# Patient Record
Sex: FEMALE | Race: WHITE | NOT HISPANIC OR LATINO | Employment: FULL TIME | URBAN - METROPOLITAN AREA
[De-identification: names, ages, dates, MRNs, and addresses within clinical notes are randomized per-mention and may not be internally consistent; named-entity substitution may affect disease eponyms.]

---

## 2017-07-11 ENCOUNTER — PATIENT MESSAGE (OUTPATIENT)
Dept: OBSTETRICS AND GYNECOLOGY | Facility: CLINIC | Age: 32
End: 2017-07-11

## 2017-07-11 RX ORDER — NORETHINDRONE ACETATE AND ETHINYL ESTRADIOL 1MG-20(21)
1 KIT ORAL DAILY
Qty: 90 TABLET | Refills: 0 | Status: SHIPPED | OUTPATIENT
Start: 2017-07-11 | End: 2017-08-21 | Stop reason: SDUPTHER

## 2017-08-21 ENCOUNTER — OFFICE VISIT (OUTPATIENT)
Dept: OBSTETRICS AND GYNECOLOGY | Facility: CLINIC | Age: 32
End: 2017-08-21
Attending: OBSTETRICS & GYNECOLOGY
Payer: COMMERCIAL

## 2017-08-21 VITALS
DIASTOLIC BLOOD PRESSURE: 70 MMHG | HEIGHT: 68 IN | BODY MASS INDEX: 25.73 KG/M2 | WEIGHT: 169.75 LBS | SYSTOLIC BLOOD PRESSURE: 120 MMHG

## 2017-08-21 DIAGNOSIS — Z30.41 ENCOUNTER FOR SURVEILLANCE OF CONTRACEPTIVE PILLS: ICD-10-CM

## 2017-08-21 DIAGNOSIS — Z01.419 WELL WOMAN EXAM WITH ROUTINE GYNECOLOGICAL EXAM: Primary | ICD-10-CM

## 2017-08-21 PROCEDURE — 99395 PREV VISIT EST AGE 18-39: CPT | Mod: S$GLB,,, | Performed by: OBSTETRICS & GYNECOLOGY

## 2017-08-21 PROCEDURE — 99999 PR PBB SHADOW E&M-EST. PATIENT-LVL II: CPT | Mod: PBBFAC,,, | Performed by: OBSTETRICS & GYNECOLOGY

## 2017-08-21 RX ORDER — NORETHINDRONE ACETATE AND ETHINYL ESTRADIOL 1MG-20(21)
1 KIT ORAL DAILY
Qty: 90 TABLET | Refills: 4 | Status: SHIPPED | OUTPATIENT
Start: 2017-08-21 | End: 2018-09-20 | Stop reason: SDUPTHER

## 2017-08-21 NOTE — PROGRESS NOTES
SUBJECTIVE:   32 y.o. female   for annual routine Pap and checkup. No LMP recorded (lmp unknown)..  She has no unusual complaints.        Past Medical History:   Diagnosis Date    HSV (herpes simplex virus) anogenital infection      Past Surgical History:   Procedure Laterality Date    HERNIA REPAIR      as 1 year old     Social History     Social History    Marital status:      Spouse name: N/A    Number of children: N/A    Years of education: N/A     Occupational History    Not on file.     Social History Main Topics    Smoking status: Never Smoker    Smokeless tobacco: Never Used    Alcohol use Yes      Comment: few drinks a night/few times a week    Drug use: No    Sexual activity: Yes     Partners: Male     Birth control/ protection: OCP     Other Topics Concern    Not on file     Social History Narrative    No narrative on file     Family History   Problem Relation Age of Onset    Sleep apnea Mother     Arthritis Mother      injuries from gymnastics; hypermobile joints    Anxiety disorder Mother     Depression Mother     Hypertension Father     Bipolar disorder Maternal Grandmother     Cancer Neg Hx     Diabetes Neg Hx     Heart attack Neg Hx     Ovarian cancer Neg Hx     Breast cancer Neg Hx     Colon cancer Neg Hx      OB History    Para Term  AB Living   0 0 0 0 0 0   SAB TAB Ectopic Multiple Live Births   0 0 0 0                 Current Outpatient Prescriptions   Medication Sig Dispense Refill    norethindrone-ethinyl estradiol (MICROGESTIN FE /, 28,) 1 mg-20 mcg (21)/75 mg (7) per tablet Take 1 tablet by mouth once daily. 90 tablet 4    valacyclovir (VALTREX) 500 MG tablet Take 1 tablet (500 mg total) by mouth 2 (two) times daily. 10 tablet 12     No current facility-administered medications for this visit.      Allergies: Review of patient's allergies indicates no known allergies.     ROS:  Constitutional: no weight loss, weight gain, fever,  "fatigue  Eyes:  No vision changes, glasses/contacts  ENT/Mouth: No ulcers, sinus problems, ears ringing, headache  Cardiovascular: No inability to lie flat, chest pain, exercise intolerance, swelling, heart palpitations  Respiratory: No wheezing, coughing blood, shortness of breath, or cough  Gastrointestinal: No diarrhea, bloody stool, nausea/vomiting, constipation, gas, hemorrhoids  Genitourinary: No blood in urine, painful urination, urgency of urination, frequency of urination, incomplete emptying, incontinence, abnormal bleeding, painful periods, heavy periods, vaginal discharge, vaginal odor, painful intercourse, sexual problems, bleeding after intercourse.  Musculoskeletal: No muscle weakness  Skin/Breast: No painful breasts, nipple discharge, masses, rash, ulcers  Neurological: No passing out, seizures, numbness, headache  Endocrine: No diabetes, hypothyroid, hyperthyroid, hot flashes, hair loss, abnormal hair growth, ance  Psychiatric: No depression, crying  Hematologic: No bruises, bleeding, swollen lymph nodes, anemia.      OBJECTIVE:   The patient appears well, alert, oriented x 3, in no distress.  /70   Ht 5' 7.5" (1.715 m)   Wt 77 kg (169 lb 12.1 oz)   LMP  (LMP Unknown)   BMI 26.19 kg/m²   NECK: no thyromegaly, trachea midline  SKIN: no acne, striae, hirsutism  BREAST EXAM: breasts appear normal, no suspicious masses, no skin or nipple changes or axillary nodes  ABDOMEN: no hernias, masses, or hepatosplenomegaly  GENITALIA: normal external genitalia, no erythema, no discharge  URETHRA: normal urethra, normal urethral meatus  VAGINA: Normal  CERVIX: no lesions or cervical motion tenderness  UTERUS: normal size, contour, position, consistency, mobility, non-tender  ADNEXA: normal adnexa and no mass, fullness, tenderness    \  ASSESSMENT:   Rich was seen today for annual exam and medication refill.    Diagnoses and all orders for this visit:    Well woman exam with routine gynecological " exam    Encounter for surveillance of contraceptive pills  -     norethindrone-ethinyl estradiol (MICROGESTIN FE 1/20, 28,) 1 mg-20 mcg (21)/75 mg (7) per tablet; Take 1 tablet by mouth once daily.    pap due next year

## 2017-08-27 DIAGNOSIS — Z30.9 ENCOUNTER FOR CONTRACEPTIVE MANAGEMENT: ICD-10-CM

## 2017-08-28 RX ORDER — NORETHINDRONE ACETATE AND ETHINYL ESTRADIOL 1MG-20(21)
1 KIT ORAL DAILY
Qty: 84 TABLET | Refills: 0 | OUTPATIENT
Start: 2017-08-28

## 2017-11-27 ENCOUNTER — TELEPHONE (OUTPATIENT)
Dept: OBSTETRICS AND GYNECOLOGY | Facility: CLINIC | Age: 32
End: 2017-11-27

## 2017-11-27 DIAGNOSIS — N76.6 VULVAR ULCER: ICD-10-CM

## 2017-11-27 RX ORDER — ACYCLOVIR 400 MG/1
400 TABLET ORAL 4 TIMES DAILY
Qty: 20 TABLET | Refills: 12 | Status: SHIPPED | OUTPATIENT
Start: 2017-11-27 | End: 2018-10-15 | Stop reason: SDUPTHER

## 2017-11-27 RX ORDER — VALACYCLOVIR HYDROCHLORIDE 500 MG/1
500 TABLET, FILM COATED ORAL 2 TIMES DAILY
Qty: 10 TABLET | Refills: 12 | Status: SHIPPED | OUTPATIENT
Start: 2017-11-27 | End: 2017-11-27

## 2018-05-23 ENCOUNTER — PATIENT MESSAGE (OUTPATIENT)
Dept: OBSTETRICS AND GYNECOLOGY | Facility: CLINIC | Age: 33
End: 2018-05-23

## 2018-05-29 ENCOUNTER — OFFICE VISIT (OUTPATIENT)
Dept: INTERNAL MEDICINE | Facility: CLINIC | Age: 33
End: 2018-05-29
Payer: COMMERCIAL

## 2018-05-29 VITALS
WEIGHT: 172.38 LBS | HEART RATE: 84 BPM | DIASTOLIC BLOOD PRESSURE: 70 MMHG | SYSTOLIC BLOOD PRESSURE: 118 MMHG | OXYGEN SATURATION: 99 % | TEMPERATURE: 98 F | HEIGHT: 67 IN | BODY MASS INDEX: 27.06 KG/M2

## 2018-05-29 DIAGNOSIS — J02.9 PHARYNGITIS, UNSPECIFIED ETIOLOGY: Primary | ICD-10-CM

## 2018-05-29 LAB — DEPRECATED S PYO AG THROAT QL EIA: NEGATIVE

## 2018-05-29 PROCEDURE — 99214 OFFICE O/P EST MOD 30 MIN: CPT | Mod: S$GLB,,, | Performed by: NURSE PRACTITIONER

## 2018-05-29 PROCEDURE — 87081 CULTURE SCREEN ONLY: CPT

## 2018-05-29 PROCEDURE — 87880 STREP A ASSAY W/OPTIC: CPT

## 2018-05-29 PROCEDURE — 3008F BODY MASS INDEX DOCD: CPT | Mod: CPTII,S$GLB,, | Performed by: NURSE PRACTITIONER

## 2018-05-29 PROCEDURE — 99999 PR PBB SHADOW E&M-EST. PATIENT-LVL IV: CPT | Mod: PBBFAC,,, | Performed by: NURSE PRACTITIONER

## 2018-05-29 NOTE — PROGRESS NOTES
Subjective:       Patient ID: Klarissa Brown is a 33 y.o. female.    Chief Complaint: Sore Throat    Pt here c/o sore throat x 3 days.  Felt feverish but did not check temp.  Pt has been taking Ibuprofen q4 hours .  Pt denies n/v/d/cough        Sore Throat    Pertinent negatives include no abdominal pain, congestion, coughing, diarrhea, drooling, neck pain, shortness of breath, trouble swallowing or vomiting.     Review of Systems   Constitutional: Positive for fever.        Subjective fever     HENT: Positive for sore throat. Negative for congestion, drooling, postnasal drip, rhinorrhea, sinus pain, sinus pressure, tinnitus, trouble swallowing and voice change.    Eyes: Negative for photophobia and visual disturbance.   Respiratory: Negative for cough and shortness of breath.    Cardiovascular: Negative for chest pain and palpitations.   Gastrointestinal: Negative for abdominal pain, constipation, diarrhea and vomiting.   Genitourinary: Negative for difficulty urinating and dysuria.   Musculoskeletal: Negative for arthralgias, myalgias, neck pain and neck stiffness.   Skin: Negative for rash.   Hematological: Negative for adenopathy.   Psychiatric/Behavioral: Negative for sleep disturbance.         Past Medical History:   Diagnosis Date    HSV (herpes simplex virus) anogenital infection      Past Surgical History:   Procedure Laterality Date    HERNIA REPAIR      as 1 year old     Social History     Social History Narrative    No narrative on file     Family History   Problem Relation Age of Onset    Sleep apnea Mother     Arthritis Mother         injuries from gymnastics; hypermobile joints    Anxiety disorder Mother     Depression Mother     Hypertension Father     Bipolar disorder Maternal Grandmother     Cancer Neg Hx     Diabetes Neg Hx     Heart attack Neg Hx     Ovarian cancer Neg Hx     Breast cancer Neg Hx     Colon cancer Neg Hx      Outpatient Encounter Prescriptions as of 5/29/2018  "  Medication Sig Dispense Refill    norethindrone-ethinyl estradiol (MICROGESTIN FE 1/20, 28,) 1 mg-20 mcg (21)/75 mg (7) per tablet Take 1 tablet by mouth once daily. 90 tablet 4    acyclovir (ZOVIRAX) 400 MG tablet Take 1 tablet (400 mg total) by mouth 4 (four) times daily. 20 tablet 12     No facility-administered encounter medications on file as of 5/29/2018.      Last 3 sets of Vitals  Vitals - 1 value per visit 10/28/2016 8/21/2017 5/29/2018   SYSTOLIC - 120 118   DIASTOLIC - 70 70   PULSE - - 84   TEMPERATURE - - 98.4   SPO2 - - 99   Weight (lb) 163.14 169.75 172.4   Weight (kg) 74 77 78.2   HEIGHT - 5' 7.5" 5' 7"   BODY MASS INDEX 25.55 26.19 27   VISIT REPORT - - -   Pain Score  0 0 2         Objective:      Physical Exam   Constitutional: She is oriented to person, place, and time. She appears well-developed and well-nourished. No distress.   32 yo female in NAD non toxic appearing     HENT:   Head: Normocephalic and atraumatic.   Right Ear: External ear normal.   Left Ear: External ear normal.   Nose: Nose normal.   Mouth/Throat: Uvula is midline and mucous membranes are normal. Posterior oropharyngeal erythema present. No oropharyngeal exudate, posterior oropharyngeal edema or tonsillar abscesses. No tonsillar exudate.   Eyes: Conjunctivae are normal. Pupils are equal, round, and reactive to light. Right eye exhibits no discharge. Left eye exhibits no discharge.   Neck: Normal range of motion. Neck supple.   Cardiovascular: Normal rate, regular rhythm, normal heart sounds and intact distal pulses.    Pulmonary/Chest: Effort normal and breath sounds normal. No respiratory distress. She has no wheezes.   Abdominal: Soft.   Lymphadenopathy:     She has no cervical adenopathy.   Neurological: She is alert and oriented to person, place, and time.   Skin: Skin is warm and dry. Capillary refill takes less than 2 seconds. No rash noted. She is not diaphoretic. No erythema. No pallor.   Psychiatric: She has a " normal mood and affect. Her behavior is normal. Judgment and thought content normal.   Nursing note and vitals reviewed.          Lab Results   Component Value Date    WBC 7.07 01/20/2015    RBC 4.28 01/20/2015    HGB 12.6 01/20/2015    HCT 37.6 01/20/2015    MCV 88 01/20/2015    MCH 29.4 01/20/2015    MCHC 33.5 01/20/2015    RDW 12.8 01/20/2015     01/20/2015    MPV 11.0 01/20/2015    GRAN 4.1 01/20/2015    GRAN 57.3 01/20/2015    LYMPH 2.5 01/20/2015    LYMPH 35.4 01/20/2015    MONO 0.4 01/20/2015    MONO 5.2 01/20/2015    EOS 0.1 01/20/2015    BASO 0.07 01/20/2015    EOSINOPHIL 1.0 01/20/2015    BASOPHIL 1.0 01/20/2015     Lab Results   Component Value Date    WBC 7.07 01/20/2015    HGB 12.6 01/20/2015    HCT 37.6 01/20/2015     01/20/2015    ALT 8 (L) 01/20/2015    AST 14 01/20/2015     01/20/2015    K 4.1 01/20/2015     01/20/2015    CREATININE 0.8 01/20/2015    BUN 12 01/20/2015    CO2 26 01/20/2015       Assessment:       1. Pharyngitis, unspecified etiology        Plan:           Klarissa was seen today for sore throat.    Diagnoses and all orders for this visit:    Pharyngitis, unspecified etiology  -     Throat Screen, Rapid      Patient Instructions     Pharyngitis (Sore Throat), Report Pending    Pharyngitis (sore throat) is often due to a virus. It can also be caused by the streptococcus, or strep, bacterium, often called strep throat. Both viral and strep infections can cause throat pain that is worse when swallowing, aching all over with headache, and fever. Both types of infections are contagious. They may be spread by coughing, kissing, or touching others after touching your mouth or nose.  A test has been done to find out whether you (or your child, if your child is the patient) have strep throat. Call this facility or your healthcare provider if you were not given your test results. If the test is positive for strep infection, you will need to take antibiotic medicines. A  prescription can be called into your pharmacy at that time. If the test is negative, you probably have a viral pharyngitis. This does not need to be treated with antibiotics. Until you receive the results of the strep test, you should stay home from work. If your child is being tested, he or she should stay home from school.  Home care  · Rest at home. Drink plenty of fluids so you won't get dehydrated.  · If the test is positive for strep, don't go to work or school for the first 2 days of taking the antibiotics. After this time, you will not be contagious. You can then return to work or school if you are feeling better.   · Take the antibiotic medicine for the full 10 days, even if you feel better. This is very important to make sure the infection is treated. It is also important to prevent drug-resistant germs from developing. If you were given an antibiotic shot, you won't need more antibiotics.  · For children: Use acetaminophen for fever, fussiness, or discomfort. In infants older than 6 months of age, you may use ibuprofen instead of acetaminophen. Talk with your child's healthcare provider before giving these medicines if your child has chronic liver or kidney disease or ever had a stomach ulcer or GI bleeding. Never give aspirin to a child under 18 years of age who is ill with a fever. It may cause severe liver damage.  · For adults: Use acetaminophen or ibuprofen to control pain or fever, unless another medicine was prescribed for this. Talk with your healthcare provider before taking these medicines if you have chronic liver or kidney disease or ever had a stomach ulcer or GI bleeding.  · Use throat lozenges or numbing throat sprays to help reduce pain. Gargling with warm salt water will also help reduce throat pain. For this, dissolve 1/2 teaspoon of salt in 1 glass of warm water. To help soothe a sore throat, children can sip on juice or a popsicle. Children 5 years and older can also suck on a lollipop  or hard candy.  · Don't eat salty or spicy foods. These can irritate the throat.  Follow-up care  Follow up with your healthcare provider or our staff if you don't get better over the next week.  When to seek medical advice  Call your healthcare provider right away if any of these occur:  · Fever as directed by your healthcare provider. For children, seek care if:  ¨ Your child is of any age and has repeated fevers above 104°F (40°C).  ¨ Your child is younger than 2 years of age and has a fever of 100.4°F (38°C) that continues for more than 1 day.  ¨ Your child is 2 years old or older and has a fever of 100.4°F (38°C) that continues for more than 3 days.  · New or worsening ear pain, sinus pain, or headache  · Painful lumps in the back of neck  · Stiff neck  · Lymph nodes are getting larger  · Inability to swallow liquids, excessive drooling, or inability to open mouth wide due to throat pain  · Signs of dehydration (very dark urine or no urine, sunken eyes, dizziness)  · Trouble breathing or noisy breathing  · Muffled voice  · New rash  · Child appears to be getting sicker  Date Last Reviewed: 4/13/2015  © 7894-0327 [employer]. [address] [place] [address], [place] [address]. All rights reserved. This information is not intended as a substitute for professional medical care. Always follow your healthcare professional's instructions.

## 2018-05-29 NOTE — PATIENT INSTRUCTIONS

## 2018-06-01 LAB — BACTERIA THROAT CULT: NORMAL

## 2018-07-16 ENCOUNTER — TELEPHONE (OUTPATIENT)
Dept: ORTHOPEDICS | Facility: CLINIC | Age: 33
End: 2018-07-16

## 2018-07-16 DIAGNOSIS — M79.642 LEFT HAND PAIN: Primary | ICD-10-CM

## 2018-07-18 ENCOUNTER — HOSPITAL ENCOUNTER (OUTPATIENT)
Dept: RADIOLOGY | Facility: OTHER | Age: 33
Discharge: HOME | End: 2018-07-18
Attending: PHYSICIAN ASSISTANT
Payer: COMMERCIAL

## 2018-07-18 ENCOUNTER — OFFICE VISIT (OUTPATIENT)
Dept: ORTHOPEDICS | Facility: CLINIC | Age: 33
End: 2018-07-18
Payer: COMMERCIAL

## 2018-07-18 VITALS
RESPIRATION RATE: 18 BRPM | WEIGHT: 172 LBS | SYSTOLIC BLOOD PRESSURE: 126 MMHG | BODY MASS INDEX: 27 KG/M2 | DIASTOLIC BLOOD PRESSURE: 78 MMHG | HEIGHT: 67 IN | HEART RATE: 82 BPM

## 2018-07-18 DIAGNOSIS — M79.642 LEFT HAND PAIN: ICD-10-CM

## 2018-07-18 DIAGNOSIS — R20.0 FINGER NUMBNESS: Primary | ICD-10-CM

## 2018-07-18 PROCEDURE — 99203 OFFICE O/P NEW LOW 30 MIN: CPT | Mod: S$GLB,,, | Performed by: PHYSICIAN ASSISTANT

## 2018-07-18 PROCEDURE — 3008F BODY MASS INDEX DOCD: CPT | Mod: CPTII,S$GLB,, | Performed by: PHYSICIAN ASSISTANT

## 2018-07-18 PROCEDURE — 73130 X-RAY EXAM OF HAND: CPT | Mod: TC,FY,LT

## 2018-07-18 PROCEDURE — 73130 X-RAY EXAM OF HAND: CPT | Mod: 26,LT,, | Performed by: RADIOLOGY

## 2018-07-18 PROCEDURE — 99999 PR PBB SHADOW E&M-EST. PATIENT-LVL III: CPT | Mod: PBBFAC,,, | Performed by: PHYSICIAN ASSISTANT

## 2018-07-18 NOTE — PROGRESS NOTES
Subjective:      Patient ID: Klarissa Brown is a 33 y.o. female.    Chief Complaint: Pain of the Left Hand      HPI  Klarissa Brown is a right hand dominant 33 y.o. female presenting today for Left long and ring finger irritation.  There was not a history of trauma.  Onset of symptoms began 1-2 months ago, it is intermittent.   She reports irritation of the fingers, she denies any pain or numbness.  She does say that the irritation may be a slight tingling.  She reports that it is predominantly in the long finger, ring finger, and small finger.  She reports that she was out of town for a week and noticed improvement in her symptoms, the symptoms returned when she came back to town.  She is unsure if it is related to her work schedule or the with she sleeps, or both.  She says that over the past few days she has tried timing a towel around her elbow to keep her arm straight while sleeping.  She has noticed improvement in her symptoms since trying this.      Review of patient's allergies indicates:  No Known Allergies      Current Outpatient Prescriptions   Medication Sig Dispense Refill    norethindrone-ethinyl estradiol (MICROGESTIN FE 1/20, 28,) 1 mg-20 mcg (21)/75 mg (7) per tablet Take 1 tablet by mouth once daily. 90 tablet 4    acyclovir (ZOVIRAX) 400 MG tablet Take 1 tablet (400 mg total) by mouth 4 (four) times daily. 20 tablet 12     No current facility-administered medications for this visit.        Past Medical History:   Diagnosis Date    HSV (herpes simplex virus) anogenital infection        Past Surgical History:   Procedure Laterality Date    HERNIA REPAIR      as 1 year old         Review of Systems:  Review of Systems   Constitution: Negative for chills and fever.   Skin: Negative for rash and suspicious lesions.   Musculoskeletal:        See HPI   Neurological: Negative for dizziness, headaches and light-headedness.   Psychiatric/Behavioral: Negative for depression. The patient is  "not nervous/anxious.          OBJECTIVE:     PHYSICAL EXAM:  Height: 5' 7" (170.2 cm) Weight: 78 kg (172 lb)  Vitals:    07/18/18 1556   BP: 126/78   Pulse: 82   Resp: 18   Weight: 78 kg (172 lb)   Height: 5' 7" (1.702 m)   PainSc: 0-No pain   PainLoc: Hand     General    Vitals reviewed.  Constitutional: She is oriented to person, place, and time. She appears well-developed and well-nourished.   HENT:   Head: Normocephalic and atraumatic.   Neck: Normal range of motion.   Cardiovascular: Normal rate.    Pulmonary/Chest: Effort normal. No respiratory distress.   Neurological: She is alert and oriented to person, place, and time.   Psychiatric: She has a normal mood and affect. Her behavior is normal. Judgment and thought content normal.             Musculoskeletal:  No scars or edema appreciated.  No ecchymosis.  Good finger and wrist range of motion bilaterally, good elbow range of motion bilaterally. She is nontender to palpation.  Positive Tinel's at the left elbow, negative on the right.  Negative Tinel's over Guyon's canal and the carpal tunnel bilaterally. Positive Durkan's on the left.  Neurovascularly intact-good sensation and motor function, good capillary refill.    RADIOGRAPHS:  Left Hand X-Ray, 7/18/18  FINDINGS:  The skeletal structures are intact with good alignment.  Joint spaces are preserved without significant arthritis.  No focal soft tissue swelling is seen.   Impression   No significant abnormality.     Comments: I have personally reviewed the imaging and I agree with the above radiologist's report.    ASSESSMENT/PLAN:   Klarissa was seen today for pain.    Diagnoses and all orders for this visit:    Finger numbness           - We talked at length about the anatomy and pathophysiology of   Encounter Diagnosis   Name Primary?    Finger numbness Yes       - patient was provided with carpal tunnel and cubital tunnel Nerve glide handout  - continue with keeping elbow straight at night for sleeping  - " if finger numbness continues will try carpal tunnel brace and/or therapy  - discussed surgical options for treatment as well  - call if not improved    Disclaimer: This note has been generated using voice-recognition software. There may be typographical errors that have been missed during proof-reading.

## 2018-08-23 ENCOUNTER — TELEPHONE (OUTPATIENT)
Dept: INTERNAL MEDICINE | Facility: CLINIC | Age: 33
End: 2018-08-23

## 2018-08-23 ENCOUNTER — OFFICE VISIT (OUTPATIENT)
Dept: INTERNAL MEDICINE | Facility: CLINIC | Age: 33
End: 2018-08-23
Payer: COMMERCIAL

## 2018-08-23 ENCOUNTER — LAB VISIT (OUTPATIENT)
Dept: LAB | Facility: HOSPITAL | Age: 33
End: 2018-08-23
Attending: INTERNAL MEDICINE
Payer: COMMERCIAL

## 2018-08-23 VITALS
HEART RATE: 68 BPM | HEIGHT: 67 IN | WEIGHT: 174.38 LBS | OXYGEN SATURATION: 99 % | TEMPERATURE: 98 F | BODY MASS INDEX: 27.37 KG/M2 | SYSTOLIC BLOOD PRESSURE: 102 MMHG | DIASTOLIC BLOOD PRESSURE: 72 MMHG

## 2018-08-23 DIAGNOSIS — Z00.00 ANNUAL PHYSICAL EXAM: ICD-10-CM

## 2018-08-23 DIAGNOSIS — Z00.00 ANNUAL PHYSICAL EXAM: Primary | ICD-10-CM

## 2018-08-23 DIAGNOSIS — Z11.3 ROUTINE SCREENING FOR STI (SEXUALLY TRANSMITTED INFECTION): ICD-10-CM

## 2018-08-23 DIAGNOSIS — K64.4 RESIDUAL HEMORRHOIDAL SKIN TAGS: ICD-10-CM

## 2018-08-23 LAB
ALBUMIN SERPL BCP-MCNC: 4.3 G/DL
ALP SERPL-CCNC: 44 U/L
ALT SERPL W/O P-5'-P-CCNC: 13 U/L
ANION GAP SERPL CALC-SCNC: 6 MMOL/L
AST SERPL-CCNC: 17 U/L
BILIRUB SERPL-MCNC: 0.4 MG/DL
BUN SERPL-MCNC: 10 MG/DL
CALCIUM SERPL-MCNC: 9.6 MG/DL
CHLORIDE SERPL-SCNC: 108 MMOL/L
CHOLEST SERPL-MCNC: 144 MG/DL
CHOLEST/HDLC SERPL: 2.7 {RATIO}
CO2 SERPL-SCNC: 26 MMOL/L
CREAT SERPL-MCNC: 0.9 MG/DL
ERYTHROCYTE [DISTWIDTH] IN BLOOD BY AUTOMATED COUNT: 12.6 %
EST. GFR  (AFRICAN AMERICAN): >60 ML/MIN/1.73 M^2
EST. GFR  (NON AFRICAN AMERICAN): >60 ML/MIN/1.73 M^2
GLUCOSE SERPL-MCNC: 92 MG/DL
HCT VFR BLD AUTO: 40.1 %
HDLC SERPL-MCNC: 54 MG/DL
HDLC SERPL: 37.5 %
HGB BLD-MCNC: 13.2 G/DL
HIV 1+2 AB+HIV1 P24 AG SERPL QL IA: NEGATIVE
LDLC SERPL CALC-MCNC: 77.8 MG/DL
MCH RBC QN AUTO: 28.4 PG
MCHC RBC AUTO-ENTMCNC: 32.9 G/DL
MCV RBC AUTO: 86 FL
NONHDLC SERPL-MCNC: 90 MG/DL
PLATELET # BLD AUTO: 317 K/UL
PMV BLD AUTO: 10.8 FL
POTASSIUM SERPL-SCNC: 4.8 MMOL/L
PROT SERPL-MCNC: 7.3 G/DL
RBC # BLD AUTO: 4.65 M/UL
SODIUM SERPL-SCNC: 140 MMOL/L
TRIGL SERPL-MCNC: 61 MG/DL
WBC # BLD AUTO: 5.62 K/UL

## 2018-08-23 PROCEDURE — 86703 HIV-1/HIV-2 1 RESULT ANTBDY: CPT

## 2018-08-23 PROCEDURE — 85027 COMPLETE CBC AUTOMATED: CPT

## 2018-08-23 PROCEDURE — 87491 CHLMYD TRACH DNA AMP PROBE: CPT

## 2018-08-23 PROCEDURE — 80053 COMPREHEN METABOLIC PANEL: CPT

## 2018-08-23 PROCEDURE — 99999 PR PBB SHADOW E&M-EST. PATIENT-LVL III: CPT | Mod: PBBFAC,,, | Performed by: INTERNAL MEDICINE

## 2018-08-23 PROCEDURE — 99395 PREV VISIT EST AGE 18-39: CPT | Mod: S$GLB,,, | Performed by: INTERNAL MEDICINE

## 2018-08-23 PROCEDURE — 80061 LIPID PANEL: CPT

## 2018-08-23 PROCEDURE — 36415 COLL VENOUS BLD VENIPUNCTURE: CPT

## 2018-08-23 PROCEDURE — 86592 SYPHILIS TEST NON-TREP QUAL: CPT

## 2018-08-23 NOTE — PATIENT INSTRUCTIONS
Try taking senna + colace when traveling to keep from being constipated.       Hemorrhoids    Hemorrhoids are swollen and inflamed veins inside the rectum and near the anus. The rectum is the last several inches of the colon. The anus is the passage between the rectum and the outside of the body.  Causes  The veins can become swollen due to increased pressure in them. This is most often caused by:  · Chronic constipation or diarrhea  · Straining when having a bowel movement  · Sitting too long on the toilet  · A low-fiber diet  · Pregnancy  Symptoms  · Bleeding from the rectum (this may be noticeable after bowel movements)  · Lump near the anus  · Itching around the anus  · Pain around the anus  There are different types of hemorrhoids. Depending on the type you have and the severity, you may be able to treat yourself at home. In some cases, a procedure may be the best treatment option. Your healthcare provider can tell you more about this, if needed.  Home care  General care  · To get relief from pain or itching, try:  ¨ Topical products. Your healthcare provider may prescribe or recommend creams, ointments, or pads that can be applied to the hemorrhoid. Use these exactly as directed.  ¨ Medicines. Your healthcare provider may recommend stool softeners, suppositories, or laxatives to help manage constipation. Use these exactly as directed.  ¨ Sitz baths. A sitz bath involves sitting in a few inches of warm bath water. Be careful not to make the water so hot that you burn yourself--test it before sitting in it. Soak for about 10 to 15 minutes a few times a day. This may help relieve pain.  Tips to help prevent hemorrhoids  · Eat more fiber. Fiber adds bulk to stool and absorbs water as it moves through your colon. This makes stool softer and easier to pass.  ¨ Increase the fiber in your diet with more fiber-rich foods. These include fresh fruit, vegetables, and whole grains.  ¨ Take a fiber supplement or bulking  agent, if advised to by your provider. These include products such as psyllium or methylcellulose.  · Drink plenty of water, if directed to by your provider. This can help keep stool soft.  · Be more active. Frequent exercise aids digestion and helps prevent constipation. It may also help make bowel movements more regular.  · Dont strain during bowel movements. This can make hemorrhoids more likely. Also, dont sit on the toilet for long periods of time.  Follow-up care  Follow up with your healthcare provider, or as advised. If a culture or imaging tests were done, you will be notified of the results when they are ready. This may take a few days or longer.  When to seek medical advice  Call your healthcare provider right away if any of these occur:  · Increased bleeding from the rectum  · Increased pain around the rectum or anus  · Weakness or dizziness  Call 911  Call 911 or return to the emergency department right away if any of these occur:  · Trouble breathing or swallowing  · Fainting or loss of consciousness  · Unusually fast heart rate  · Vomiting blood  · Large amounts of blood in stool  Date Last Reviewed: 6/22/2015  © 6048-2057 [employer]. [address] [place] [address], [place] [address]. All rights reserved. This information is not intended as a substitute for professional medical care. Always follow your healthcare professional's instructions.

## 2018-08-23 NOTE — PROGRESS NOTES
Subjective:       Patient ID: Klarissa Brown is a 33 y.o. female.    Chief Complaint: Establish Care    HPI  32 y/o woman here for visit to [facility] / annual exam. Last saw Dr Kenny for [facility] visit in 2015.     Concerned re: possible hemorrhoid, otherwise no major medical concerns.    Hemorrhoid - Has noted bump at rectum, not enlarging, has had this for years, would like this checked. Occasional tear if constipation + straining with some pain but not painful generally, occasional itching, no blood on toilet paper. Does tend to have constipation since childhood; better recently with better diet, good hydration, regular exercise but has this when traveling or being off her usual routine.     Recently saw Ortho for hand / finger pain and numbness - noted to have ulnar nerve compression while sleeping, has started sleeping with arm straight and watching ergonomics, using elbow brace -- this has resolved the problem.     Follows with Dr Fallon for Gyn. Last Pap 6/2015, due for repeat. Reports h/o HPV in the past but doesn't think this was hi-risk strain.  On OCPs for contraception.    Father with prediabetes at age 70  Thinks up to date on TDaP as of [id number]  Has traveled in [place] for work previously    Review of Systems   Constitutional: Negative for activity change and unexpected weight change.   HENT: Negative.    Eyes: Negative for visual disturbance.   Respiratory: Negative.  Negative for shortness of breath.    Cardiovascular: Negative.  Negative for leg swelling.   Gastrointestinal: Negative.  Negative for abdominal pain, anal bleeding, blood in stool and rectal pain. Constipation: occasionally, not currently.   Endocrine: Negative.    Genitourinary: Negative.    Musculoskeletal: Positive for back pain (mild recently). Negative for joint swelling. Arthralgias: resolved.   Skin: Negative for rash.   Allergic/Immunologic: Negative for environmental allergies.   Neurological: Negative.   "  Psychiatric/Behavioral: Negative for dysphoric mood. The patient is not nervous/anxious.          Past Medical History:   Diagnosis Date    HSV (herpes simplex virus) anogenital infection      Past Surgical History:   Procedure Laterality Date    HERNIA REPAIR      as 1 year old     Family History   Problem Relation Age of Onset    Sleep apnea Mother     Arthritis Mother         injuries from gymnastics; hypermobile joints    Anxiety disorder Mother     Depression Mother     Hypertension Father     Bipolar disorder Maternal Grandmother     Other Brother         hypomania but not bipolar d/o?    Cancer Neg Hx     Diabetes Neg Hx     Heart attack Neg Hx     Ovarian cancer Neg Hx     Breast cancer Neg Hx     Colon cancer Neg Hx        Social History     Tobacco Use    Smoking status: Never Smoker    Smokeless tobacco: Never Used   Substance Use Topics    Alcohol use: Yes     Comment: 3-5 drinks/week    Drug use: No       Medications and allergies reviewed.     Objective:          Vitals:    08/23/18 0848   BP: 102/72   BP Location: Left arm   Patient Position: Sitting   Pulse: 68   Temp: 98.2 °F (36.8 °C)   TempSrc: Oral   SpO2: 99%   Weight: 79.1 kg (174 lb 6.1 oz)   Height: 5' 7" (1.702 m)     Body mass index is 27.31 kg/m².  Physical Exam   Constitutional: She is oriented to person, place, and time. She appears well-developed and well-nourished. No distress.   HENT:   Head: Normocephalic and atraumatic.   Mouth/Throat: Oropharynx is clear and moist.   Eyes: Conjunctivae and EOM are normal. Pupils are equal, round, and reactive to light.   Neck: Neck supple. No thyromegaly present.   Cardiovascular: Normal rate, regular rhythm and normal heart sounds.   No murmur heard.  Pulmonary/Chest: Effort normal and breath sounds normal. No respiratory distress.   Abdominal: Soft. Bowel sounds are normal. She exhibits no distension. There is no tenderness.   Genitourinary:   Genitourinary Comments: One " hemorrhoidal skin tag, no bleeding, no firm nodule or skin changes, no pain, normal rectal tone   Musculoskeletal: Normal range of motion. She exhibits no edema or tenderness.   +tension but not tenderness in paraspinal muscles of low back   Lymphadenopathy:     She has no cervical adenopathy.   Neurological: She is alert and oriented to person, place, and time. No cranial nerve deficit.   Skin: Skin is warm and dry.   Psychiatric: She has a normal mood and affect. Her behavior is normal. Thought content normal.   Vitals reviewed.      Lab Results   Component Value Date    WBC 7.07 01/20/2015    HGB 12.6 01/20/2015    HCT 37.6 01/20/2015     01/20/2015    ALT 8 (L) 01/20/2015    AST 14 01/20/2015     01/20/2015    K 4.1 01/20/2015     01/20/2015    CREATININE 0.8 01/20/2015    BUN 12 01/20/2015    CO2 26 01/20/2015       Assessment:       1. Annual physical exam    2. Routine screening for STI (sexually transmitted infection)    3. Residual hemorrhoidal skin tags        Plan:   Klarissa was seen today for establish care.    Diagnoses and all orders for this visit:    Annual physical exam - Overall healthy, doing well. Reviewed chronic and preventive health concerns.  She will check on her immunization record and send this in  Reminded her to get Pap test done  Reminded re: flu vaccine in the fall  -     CBC Without Differential; Future  -     Comprehensive metabolic panel; Future  -     Lipid panel; Future    Routine screening for STI (sexually transmitted infection) - no recent HIV test, would like these done, no recent risk of exposure  H/o HSV, HPV in the past  -     C. trachomatis/N. gonorrhoeae by AMP DNA  -     HIV-1 and HIV-2 antibodies; Future  -     RPR; Future    Residual hemorrhoidal skin tags - counseled re: hemorrhoids, recommended use senna/colace if constipating    Ulnar nerve compression symptoms have resolved - continue watching ergonomics, elbow brace    Health maintenance reviewed  with patient.   Labs today  Follow-up in about 1 year (around 8/23/2019) for annual physical.    Greg Cook MD  Internal Medicine  [facility]  8/23/2018

## 2018-08-24 LAB
C TRACH DNA SPEC QL NAA+PROBE: NOT DETECTED
N GONORRHOEA DNA SPEC QL NAA+PROBE: NOT DETECTED
RPR SER QL: NORMAL

## 2018-09-20 DIAGNOSIS — Z30.41 ENCOUNTER FOR SURVEILLANCE OF CONTRACEPTIVE PILLS: ICD-10-CM

## 2018-09-24 RX ORDER — NORETHINDRONE ACETATE AND ETHINYL ESTRADIOL 1MG-20(21)
KIT ORAL
Qty: 84 TABLET | Refills: 0 | Status: SHIPPED | OUTPATIENT
Start: 2018-09-24 | End: 2018-10-15 | Stop reason: SDUPTHER

## 2018-10-15 ENCOUNTER — OFFICE VISIT (OUTPATIENT)
Dept: OBSTETRICS AND GYNECOLOGY | Facility: CLINIC | Age: 33
End: 2018-10-15
Attending: OBSTETRICS & GYNECOLOGY
Payer: COMMERCIAL

## 2018-10-15 VITALS
SYSTOLIC BLOOD PRESSURE: 110 MMHG | WEIGHT: 174.63 LBS | DIASTOLIC BLOOD PRESSURE: 80 MMHG | HEIGHT: 67 IN | BODY MASS INDEX: 27.41 KG/M2

## 2018-10-15 DIAGNOSIS — Z30.41 ENCOUNTER FOR SURVEILLANCE OF CONTRACEPTIVE PILLS: ICD-10-CM

## 2018-10-15 DIAGNOSIS — Z01.419 WELL WOMAN EXAM WITH ROUTINE GYNECOLOGICAL EXAM: Primary | ICD-10-CM

## 2018-10-15 PROCEDURE — 88175 CYTOPATH C/V AUTO FLUID REDO: CPT | Performed by: PATHOLOGY

## 2018-10-15 PROCEDURE — 87624 HPV HI-RISK TYP POOLED RSLT: CPT

## 2018-10-15 PROCEDURE — 88141 CYTOPATH C/V INTERPRET: CPT | Mod: ,,, | Performed by: PATHOLOGY

## 2018-10-15 PROCEDURE — 99395 PREV VISIT EST AGE 18-39: CPT | Mod: S$GLB,,, | Performed by: OBSTETRICS & GYNECOLOGY

## 2018-10-15 PROCEDURE — 99999 PR PBB SHADOW E&M-EST. PATIENT-LVL III: CPT | Mod: PBBFAC,,, | Performed by: OBSTETRICS & GYNECOLOGY

## 2018-10-15 RX ORDER — ACYCLOVIR 400 MG/1
400 TABLET ORAL 4 TIMES DAILY
Qty: 20 TABLET | Refills: 12 | Status: SHIPPED | OUTPATIENT
Start: 2018-10-15 | End: 2018-10-20

## 2018-10-15 RX ORDER — NORETHINDRONE ACETATE AND ETHINYL ESTRADIOL 1MG-20(21)
1 KIT ORAL DAILY
Qty: 84 TABLET | Refills: 4 | Status: SHIPPED | OUTPATIENT
Start: 2018-10-15 | End: 2019-07-29

## 2018-10-15 NOTE — PROGRESS NOTES
SUBJECTIVE:   33 y.o. female   for annual routine Pap and checkup. No LMP recorded. Patient is not currently having periods (Reason: Birth Control)..  She has no unusual complaints.        Past Medical History:   Diagnosis Date    HSV (herpes simplex virus) anogenital infection      Past Surgical History:   Procedure Laterality Date    HERNIA REPAIR      as 1 year old     Social History     Socioeconomic History    Marital status:      Spouse name: Not on file    Number of children: Not on file    Years of education: Not on file    Highest education level: Not on file   Social Needs    Financial resource strain: Not on file    Food insecurity - worry: Not on file    Food insecurity - inability: Not on file    Transportation needs - medical: Not on file    Transportation needs - non-medical: Not on file   Occupational History    Not on file   Tobacco Use    Smoking status: Never Smoker    Smokeless tobacco: Never Used   Substance and Sexual Activity    Alcohol use: Yes     Comment: 3-5 drinks/week    Drug use: No    Sexual activity: Yes     Partners: Male     Birth control/protection: OCP   Other Topics Concern    Not on file   Social History Narrative    Not on file     Family History   Problem Relation Age of Onset    Sleep apnea Mother     Arthritis Mother         injuries from gymnastics; hypermobile joints    Anxiety disorder Mother     Depression Mother     Hypertension Father     Bipolar disorder Maternal Grandmother     Other Brother         hypomania but not bipolar d/o?    Cancer Neg Hx     Diabetes Neg Hx     Heart attack Neg Hx     Ovarian cancer Neg Hx     Breast cancer Neg Hx     Colon cancer Neg Hx      OB History    Para Term  AB Living   0 0 0 0 0 0   SAB TAB Ectopic Multiple Live Births   0 0 0 0                 Current Outpatient Medications   Medication Sig Dispense Refill    acyclovir (ZOVIRAX) 400 MG tablet Take 1 tablet (400 mg  "total) by mouth 4 (four) times daily. for 5 days 20 tablet 12    norethindrone-ethinyl estradiol (BLISOVI FE 1/20, 28,) 1 mg-20 mcg (21)/75 mg (7) per tablet Take 1 tablet by mouth once daily. 84 tablet 4     No current facility-administered medications for this visit.      Allergies: Patient has no known allergies.     ROS:  Constitutional: no weight loss, weight gain, fever, fatigue  Eyes:  No vision changes, glasses/contacts  ENT/Mouth: No ulcers, sinus problems, ears ringing, headache  Cardiovascular: No inability to lie flat, chest pain, exercise intolerance, swelling, heart palpitations  Respiratory: No wheezing, coughing blood, shortness of breath, or cough  Gastrointestinal: No diarrhea, bloody stool, nausea/vomiting, constipation, gas, hemorrhoids  Genitourinary: No blood in urine, painful urination, urgency of urination, frequency of urination, incomplete emptying, incontinence, abnormal bleeding, painful periods, heavy periods, vaginal discharge, vaginal odor, painful intercourse, sexual problems, bleeding after intercourse.  Musculoskeletal: No muscle weakness  Skin/Breast: No painful breasts, nipple discharge, masses, rash, ulcers  Neurological: No passing out, seizures, numbness, headache  Endocrine: No diabetes, hypothyroid, hyperthyroid, hot flashes, hair loss, abnormal hair growth, ance  Psychiatric: No depression, crying  Hematologic: No bruises, bleeding, swollen lymph nodes, anemia.      OBJECTIVE:   The patient appears well, alert, oriented x 3, in no distress.  /80   Ht 5' 7" (1.702 m)   Wt 79.2 kg (174 lb 9.7 oz)   BMI 27.35 kg/m²   NECK: no thyromegaly, trachea midline  SKIN: no acne, striae, hirsutism  BREAST EXAM: breasts appear normal, no suspicious masses, no skin or nipple changes or axillary nodes  ABDOMEN: no hernias, masses, or hepatosplenomegaly  GENITALIA: normal external genitalia, no erythema, no discharge  URETHRA: normal urethra, normal urethral meatus  VAGINA: " Normal  CERVIX: no lesions or cervical motion tenderness  UTERUS: enlarged, 8 weeks size  ADNEXA: no mass, fullness, tenderness    \  ASSESSMENT:   Rich was seen today for well woman.    Diagnoses and all orders for this visit:    Well woman exam with routine gynecological exam  -     Liquid-based pap smear, screening  -     HPV High Risk Genotypes, PCR    Encounter for surveillance of contraceptive pills  -     norethindrone-ethinyl estradiol (BLISOVI FE 1/20, 28,) 1 mg-20 mcg (21)/75 mg (7) per tablet; Take 1 tablet by mouth once daily.    Other orders  -     acyclovir (ZOVIRAX) 400 MG tablet; Take 1 tablet (400 mg total) by mouth 4 (four) times daily. for 5 days

## 2018-10-22 LAB
HPV HR 12 DNA CVX QL NAA+PROBE: POSITIVE
HPV16 AG SPEC QL: NEGATIVE
HPV18 DNA SPEC QL NAA+PROBE: NEGATIVE

## 2019-07-26 ENCOUNTER — PATIENT OUTREACH (OUTPATIENT)
Dept: ADMINISTRATIVE | Facility: OTHER | Age: 34
End: 2019-07-26

## 2019-07-29 ENCOUNTER — LAB VISIT (OUTPATIENT)
Dept: LAB | Facility: OTHER | Age: 34
End: 2019-07-29
Attending: OBSTETRICS & GYNECOLOGY
Payer: COMMERCIAL

## 2019-07-29 ENCOUNTER — OFFICE VISIT (OUTPATIENT)
Dept: OBSTETRICS AND GYNECOLOGY | Facility: CLINIC | Age: 34
End: 2019-07-29
Attending: OBSTETRICS & GYNECOLOGY
Payer: COMMERCIAL

## 2019-07-29 ENCOUNTER — PATIENT MESSAGE (OUTPATIENT)
Dept: OBSTETRICS AND GYNECOLOGY | Facility: CLINIC | Age: 34
End: 2019-07-29

## 2019-07-29 VITALS
WEIGHT: 172.38 LBS | BODY MASS INDEX: 26.13 KG/M2 | DIASTOLIC BLOOD PRESSURE: 80 MMHG | HEIGHT: 68 IN | SYSTOLIC BLOOD PRESSURE: 134 MMHG

## 2019-07-29 DIAGNOSIS — Z31.69 PROCREATIVE MANAGEMENT COUNSELING: ICD-10-CM

## 2019-07-29 DIAGNOSIS — Z01.419 WELL WOMAN EXAM WITH ROUTINE GYNECOLOGICAL EXAM: Primary | ICD-10-CM

## 2019-07-29 PROCEDURE — 86762 RUBELLA ANTIBODY: CPT

## 2019-07-29 PROCEDURE — 88141 CYTOPATH C/V INTERPRET: CPT | Mod: ,,, | Performed by: PATHOLOGY

## 2019-07-29 PROCEDURE — 88141 LIQUID-BASED PAP SMEAR, SCREENING: ICD-10-PCS | Mod: ,,, | Performed by: PATHOLOGY

## 2019-07-29 PROCEDURE — 87624 HPV HI-RISK TYP POOLED RSLT: CPT

## 2019-07-29 PROCEDURE — 88175 CYTOPATH C/V AUTO FLUID REDO: CPT | Performed by: PATHOLOGY

## 2019-07-29 PROCEDURE — 99395 PR PREVENTIVE VISIT,EST,18-39: ICD-10-PCS | Mod: S$GLB,,, | Performed by: OBSTETRICS & GYNECOLOGY

## 2019-07-29 PROCEDURE — 99999 PR PBB SHADOW E&M-EST. PATIENT-LVL III: ICD-10-PCS | Mod: PBBFAC,,, | Performed by: OBSTETRICS & GYNECOLOGY

## 2019-07-29 PROCEDURE — 36415 COLL VENOUS BLD VENIPUNCTURE: CPT

## 2019-07-29 PROCEDURE — 99395 PREV VISIT EST AGE 18-39: CPT | Mod: S$GLB,,, | Performed by: OBSTETRICS & GYNECOLOGY

## 2019-07-29 PROCEDURE — 99999 PR PBB SHADOW E&M-EST. PATIENT-LVL III: CPT | Mod: PBBFAC,,, | Performed by: OBSTETRICS & GYNECOLOGY

## 2019-07-29 RX ORDER — ACYCLOVIR 400 MG/1
TABLET ORAL
Refills: 12 | COMMUNITY
Start: 2019-05-23 | End: 2020-07-02

## 2019-07-29 NOTE — PROGRESS NOTES
SUBJECTIVE:   34 y.o. female   for annual routine Pap and checkup. Patient's last menstrual period was 2019 (exact date)..  She has no unusual complaints and desires pregnancy.  She stopped her ocps and is on pnv.  Her  had semen analysis that is normal and had Religion carrier screening done.        Past Medical History:   Diagnosis Date    HSV (herpes simplex virus) anogenital infection      Past Surgical History:   Procedure Laterality Date    HERNIA REPAIR      as 1 year old     Social History     Socioeconomic History    Marital status:      Spouse name: Not on file    Number of children: Not on file    Years of education: Not on file    Highest education level: Not on file   Occupational History    Not on file   Social Needs    Financial resource strain: Not on file    Food insecurity:     Worry: Not on file     Inability: Not on file    Transportation needs:     Medical: Not on file     Non-medical: Not on file   Tobacco Use    Smoking status: Never Smoker    Smokeless tobacco: Never Used   Substance and Sexual Activity    Alcohol use: Yes     Comment: 3-5 drinks/week    Drug use: No    Sexual activity: Yes     Partners: Male     Birth control/protection: None   Lifestyle    Physical activity:     Days per week: Not on file     Minutes per session: Not on file    Stress: Not on file   Relationships    Social connections:     Talks on phone: Not on file     Gets together: Not on file     Attends Manda[employer] service: Not on file     Active member of club or organization: Not on file     Attends meetings of clubs or organizations: Not on file     Relationship status: Not on file   Other Topics Concern    Not on file   Social History Narrative    Not on file     Family History   Problem Relation Age of Onset    Sleep apnea Mother     Arthritis Mother         injuries from gymnastics; hypermobile joints    Anxiety disorder Mother     Depression Mother      "Hypertension Father     Bipolar disorder Maternal Grandmother     Other Brother         hypomania but not bipolar d/o?    Cancer Neg Hx     Diabetes Neg Hx     Heart attack Neg Hx     Ovarian cancer Neg Hx     Breast cancer Neg Hx     Colon cancer Neg Hx      OB History    Para Term  AB Living   0 0 0 0 0 0   SAB TAB Ectopic Multiple Live Births   0 0 0 0           Current Outpatient Medications   Medication Sig Dispense Refill    acyclovir (ZOVIRAX) 400 MG tablet TAKE 1 TABLET (400 MG TOTAL) BY MOUTH 4 (FOUR) TIMES DAILY. FOR 5 DAYS  12     No current facility-administered medications for this visit.      Allergies: Patient has no known allergies.     ROS:  Constitutional: no weight loss, weight gain, fever, fatigue  Eyes:  No vision changes, glasses/contacts  ENT/Mouth: No ulcers, sinus problems, ears ringing, headache  Cardiovascular: No inability to lie flat, chest pain, exercise intolerance, swelling, heart palpitations  Respiratory: No wheezing, coughing blood, shortness of breath, or cough  Gastrointestinal: No diarrhea, bloody stool, nausea/vomiting, constipation, gas, hemorrhoids  Genitourinary: No blood in urine, painful urination, urgency of urination, frequency of urination, incomplete emptying, incontinence, abnormal bleeding, painful periods, heavy periods, vaginal discharge, vaginal odor, painful intercourse, sexual problems, bleeding after intercourse.  Musculoskeletal: No muscle weakness  Skin/Breast: No painful breasts, nipple discharge, masses, rash, ulcers  Neurological: No passing out, seizures, numbness, headache  Endocrine: No diabetes, hypothyroid, hyperthyroid, hot flashes, hair loss, abnormal hair growth, ance  Psychiatric: No depression, crying  Hematologic: No bruises, bleeding, swollen lymph nodes, anemia.      OBJECTIVE:   The patient appears well, alert, oriented x 3, in no distress.  /80   Ht 5' 7.5" (1.715 m)   Wt 78.2 kg (172 lb 6.4 oz)   LMP " 07/05/2019 (Exact Date)   BMI 26.60 kg/m²   NECK: no thyromegaly, trachea midline  SKIN: no acne, striae, hirsutism  BREAST EXAM: breasts appear normal, no suspicious masses, no skin or nipple changes or axillary nodes  ABDOMEN: no hernias, masses, or hepatosplenomegaly  GENITALIA: normal external genitalia, no erythema, no discharge  URETHRA: normal urethra, normal urethral meatus  VAGINA: Normal  CERVIX: no lesions or cervical motion tenderness  UTERUS: enlarged, 8 weeks size  ADNEXA: normal adnexa and no mass, fullness, tenderness    \  ASSESSMENT:   .Klarissa was seen today for annual exam and consult.    Diagnoses and all orders for this visit:    Well woman exam with routine gynecological exam  -     Liquid-based pap smear, screening  -     HPV High Risk Genotypes, PCR    Procreative management counseling  -     Rubella antibody, IgG; Future    I explained that it can take up to a year for normally fertile couples to conceive.  We discussed optimal timing, ovulation predictor kits, daily pnv.  I offered her rubella and CF screening  She will send me the results of her 's screening.

## 2019-07-29 NOTE — PATIENT INSTRUCTIONS
ACOG publications are protected by copyright and all rights are reserved. ACOG publications may not be reproduced in any form or by any means without written permission from the copyright owner. This includes the posting of electronic files on the Internet, transferring electronic files to other persons, distributing printed output, and photocopying. Requests for authorization to make photocopies should be directed to: [facility], [address] [place], [place] [address] [phone]    Human Papillomavirus (HPV) Infection  Infection with human papillomavirus (HPV) is very common in both women and men. More than 100 types of HPV have been found, and about 30 of these types are spread from person to person through sexual contact. Some types of HPV cause genital warts, while others cause cancer of the cervix. Two vaccines are available that can protect against some of these HPV types.  This pamphlet explains   HPV infection and how it is spread   HPV and genital warts   HPV and cancer   screening tests   preventing HPV infection   What Is Human Papillomavirus?  HPV is a very common virus. Some research suggests that at least three out of four people who have sex will get a genital HPV infection at some time during their lives.   HPV is primarily spread through vaginal, anal, or oral sex, but sexual intercourse is not required for infection to occur. HPV is spread by skin-to-skin contact. Sexual contact with an infected partner, regardless of the sex of the partner, is the most common way the virus is spread.   Like many other sexually transmitted diseases (STDs), there often are no signs or symptoms of genital HPV infection. The infected person often is not aware that he or she has been infected.   HPV and Genital Warts  Approximately 12 types of HPV cause genital warts. These types are called low-risk HPV types because they are not linked to cancer. Two types, types 6 and 11, are the main cause of  genital warts. These growths may appear on the outside or inside of the vagina or on the penis and can spread to nearby skin. Genital warts also can grow around the anus, on the vulva, or on the cervix. They can be treated with medication applied to the area or surgery to remove them. The type of treatment depends on where the warts are located.   HPV and Cancer  Approximately 15 types of HPV are linked to cancer of the anus, cervix, vulva, vagina, and penis. They also can cause cancer of the head and neck. These types of HPV are known as high-risk types. Most cases of cervical cancer are caused by just two types of HPV--types 16 and 18.   Although certain types of HPV can cause cancer of the cervix, very few women infected with HPV develop this type of cancer. In most women, the immune system destroys the virus before it causes cancer. But in some women, HPV is not destroyed by the immune system and does not go away. In these cases, HPV can lead to cancer or, more commonly, precancer.   How HPV Infects the Cervix  The cervix is the opening of the uterus at the top of the vagina. It is covered by a thin layer of tissue made up of cells. If a high-risk type of HPV is present, it may enter these cells. Infected cells may become abnormal or damaged and begin to grow differently.   In some cases, the changes in these cells may progress to what is known as precancer. These changes in the thin tissue covering the cervix are called dysplasia or cervical intraepithelial neoplasia (ANA). Dysplasia and ANA are graded as mild, moderate, or severe. Mild dysplasia (ANA 1) usually goes away on its own. Moderate (ANA 2) and severe (ANA 3) dysplasia indicate more serious changes. Both high-risk and low-risk types of HPV can cause the growth of abnormal cells, but only the high-risk types increase the chance that mild changes will progress to the more serious changes of cervical cancer.   HPV infections that are not destroyed by  the immune system are described as persistent. If a woman is infected with a high-risk HPV type, and the virus is not killed by the immune system, it increases her risk of developing ANA. Young women will get rid of the virus quicker than older women. Also, whether a woman smokes affects her ability to get rid of the virus. The longer high-risk HPV persists and the older the woman, the greater the risk of ANA. When HPV is present, smoking doubles the risk of progression to ANA 3.   Screening Tests  It usually takes years for cervical cancer to develop. During this time, HPV infection can cause cells on or around the cervix to become abnormal. A Pap test, sometimes called cervical cytology screening, can detect early signs of abnormal cell changes of the cervix and allows early treatment so they do not become cancer. In this test, a sample of cells is taken from the cervix and sent to a lab. Regular use of the Pap test has greatly reduced the number of cases of cervical cancer in the [place].   An HPV test also is available. It is used along with the Pap test in women 30 years and older and as a follow-up test for women whose Pap tests show abnormal or uncertain results. The HPV test can identify at least 13 of the high-risk types of HPV. It can detect high-risk types of HPV even before there are visible changes to the cervical cells. Currently, there are no approved tests to detect HPV infection in men.   Testing Guidelines  Women should start having Pap tests at age 21 years. How often a Pap test is performed depends on a womans age and health history:   Women younger than 30 years should have a Pap test every 2 years.   Women 30 years and older should have a Pap test every 2 years. After three normal Pap test results in a row, a woman in this age group may have Pap tests every 3 years if   -- she does not have a history of moderate or severe dysplasia  -- she is not infected with human immunodeficiency  virus (HIV)   -- her immune system is not weakened (if she has had an organ transplant, for example)  -- she was not exposed to diethylstilbestrol (MICHELE) before birth   Women older than 30 years may have an HPV test at the same time as a Pap test. If the results of both tests are normal, these women should not have another Pap test or HPV test for at least 3 years.   Women aged 65 years or 70 years who are at low risk of getting HPV may be able to stop having Pap tests if they have had three or more normal test results in a row and no abnormal test results in the previous 10 years.   Abnormal Results    HPV Vaccines  More than 30 types of HPV can infect the genital areas of a woman or a man:  Most cases of cervical cancer are caused by types 16 and 18.   Most cases of genital warts are caused by types 6 and 11.   Vaccines are available that can protect against these types of HPV. One vaccine protects against HPV types 16 and 18, which are the cause of most cases of cervical cancer. Another vaccine protects against types 16 and 18 as well as types 6 and 11, which are the cause of most cases of genital warts. These vaccines trigger a womans immune system to fight off these viruses if she is exposed to them. They do not protect against other types of HPV.   Both vaccines are given in three doses over a 6-month period. Both are recommended for girls ages 11 years and 12 years, and for teens and women between ages 13 years and 26 years who did not get any or all of the three recommended doses when they were younger. The vaccines also can be given to girls beginning at age 9 years.   The vaccines are most effective if they are given before a woman is sexually active and is exposed to HPV. However, young women can receive the vaccine even if they have already had sex, have had genital warts, have received abnormal Pap tests results, or have been infected with HPV. If a woman is already infected with one type of HPV, the  vaccines will not protect against disease caused by that type. However, the vaccines can protect women who have one type of HPV infection from the other types of HPV covered by the vaccines.   The vaccines are not a treatment for current HPV infection. They do not protect against all types of HPV and thus do not give complete protection against cervical cancer or genital warts. The vaccines are not recommended for pregnant women but are safe for women who are breastfeeding. Because these vaccines do not protect against all types of HPV, women who are vaccinated should still have regular Pap tests.   The most common side effect is soreness in the arm where the shot is given. On rare occasions, persons who received the shot experienced headache, fatigue, nausea, dizziness, fainting, or mild pain in the arm. These symptoms are mild and usually go away quickly.    If a Pap test result is abnormal, follow-up testing is done. This testing can be simply a repeat Pap test in 6 months or 12 months, an HPV test, or a more detailed exam called a colposcopy (with or without a biopsy).   If results of follow-up tests indicate precancerous changes, treatment to remove the abnormal cells may be needed. There are several techniques that are used to remove abnormal cells. Whether treatment is needed depends on many factors:   A woman's age   The type of abnormal result (mild, moderate, or severe dysplasia)   How long the abnormal cells have been present   Prevention  There is no medical cure for HPV--it is best to take steps to prevent it. Young women can prevent certain types of HPV infection by being vaccinated (see box). You can decrease your risk of infection by avoiding contact with the virus. The following can help decrease your chance of infection:   Limit your number of sexual partners. The more partners you have over the course of your life, the greater your risk of infection.   Use condoms to reduce your risk of infection  when you have vaginal, anal, or oral sex. Condoms also help protect against other STDs.   Condoms cannot fully protect you against HPV infection. HPV can be passed from person to person by touching infected areas not covered by a condom. These areas may include skin in the genital or anal areas. Female condoms cover more skin and may provide a little more protection than male condoms.   Finally...  Some types of HPV infections spread from person to person through sexual contact. To reduce your risk of infection, limit your number of sexual partners and use condoms. If you are 26 years or younger, the HPV vaccine may help protect you from infection. Regular Pap tests and any follow-up tests that your health care provider recommends are the best ways to prevent cervical cancer.   Glossary  Biopsy: A minor surgical procedure to remove a small piece of tissue that is then examined with a microscope in a laboratory.   Cell: The smallest unit of a structure in the body; the building blocks for all parts of the body.   Cervical Intraepithelial Neoplasia (ANA): Another term for dysplasia; a noncancerous condition that occurs when normal cells on the surface of the cervix are replaced by a layer of abnormal cells. ANA is graded as 1 (mild dysplasia), 2 (moderate dysplasia), or 3 (severe dysplasia or carcinoma in situ).   Cervix: The opening of the uterus at the top of the vagina.   Colposcopy: Viewing of the cervix, vulva, or vagina with magnification by using an instrument called a colposcope.   Dysplasia: A noncancerous condition that occurs when normal cells are replaced by a layer of abnormal cells.   Human Immunodeficiency Virus (HIV): A virus that attacks certain cells of the bodys immune system and causes acquired immunodeficiency syndrome (AIDS).   Immune System: The bodys natural defense system against foreign substances and invading organisms, such as bacteria that cause disease.   Pap Test: A test in which cells  are taken from the cervix and vagina and examined under a microscope.   Sexual [place]: The act of the penis of the male entering the vagina of the female (also called having sex or making love).   Sexually Transmitted Disease: A disease that is spread by sexual contact, including chlamydia, gonorrhea, human papillomavirus infection, herpes, syphilis, and infection with human immunodeficiency virus (HIV, the cause of acquired immunodeficiency syndrome [AIDS]).   Vulva: The external female genital area.   This Patient Education Pamphlet was developed by the American College of Obstetricians and Gynecologists. Designed as an aid to patients, it sets forth current information and opinions on subjects related to womens health. The average readability level of the series, based on the Bertrand formula, is grade 6-8. The Suitability Assessment of Materials (THU) instrument rates the pamphlets as superior. To ensure the information is current and accurate, the pamphlets are reviewed every 18 months. The information in this pamphlet does not dictate an exclusive course of treatment or procedure to be followed and should not be construed as excluding other acceptable methods of practice. Variations, taking into account the needs of the individual patient, resources, and limitations unique to the institution or type of practice, may be appropriate.  Copyright November 2010 by the American College of Obstetri-cians and Gynecologists. All rights reserved. No part of this publication may be reproduced, stored in a retrieval system, posted on the Internet, or transmitted, in any form or by any means, electronic, mechanical, photocopying, recording, or otherwise, without prior written permission from the publisher.   ISSN [id number]   Requests for authorization to make photocopies should be directed to the [facility], [address] [place] [address], [place] [address].   To reorder Patient Education Pamphlets in packs of  50, please call [phone] or order online at [email or web address].   The American College of Ob[employer]etricians and Gynecologists  [address] [place] [address]  [address] [address]  [place] [address] [id number]  Contact Us   Copyright Information   ADTELLIGENCE   News Room   Privacy Statement   Resources & Publications   RSS   Sitemap  American [employer]ologists  [address] [place] [address], [place] [address]  Mailing Address: [address] [address], [place]

## 2019-07-30 LAB
RUBV IGG SER-ACNC: 31.6 IU/ML
RUBV IGG SER-IMP: REACTIVE

## 2019-08-01 ENCOUNTER — TELEPHONE (OUTPATIENT)
Dept: OBSTETRICS AND GYNECOLOGY | Facility: CLINIC | Age: 34
End: 2019-08-01

## 2019-08-01 NOTE — TELEPHONE ENCOUNTER
----- Message from Eloy Godoy MA sent at 7/30/2019  9:21 AM CDT -----    Hi Dr. Fallon,     Attached is the full report from my  [facility] genetic testing. Looks like they did test for cystic fibrosis and he isn't a carrier.  Let me know if you think i should also get tested.     Thank you!   Guilherme     (I PRINTED THE ATTACHMENT ITS ON YOUR DESK)

## 2019-08-07 ENCOUNTER — TELEPHONE (OUTPATIENT)
Dept: OBSTETRICS AND GYNECOLOGY | Facility: CLINIC | Age: 34
End: 2019-08-07

## 2019-08-07 NOTE — TELEPHONE ENCOUNTER
----- Message from Hiwot Bloom sent at 8/7/2019  4:30 PM CDT -----  Contact: FATUMA ESPINOZA [[id number]]    Name of Who is Calling: FATUMA ESPINOZA [[id number]]       What is the request in detail: Patient is returning a call from staff to a missed called, she thinks the call was in regards to her pap .....Please contact to further discuss and advise.     Can the clinic reply by [facility]: No     What Number to Call Back if not in [facility]:  [phone]

## 2019-08-12 ENCOUNTER — TELEPHONE (OUTPATIENT)
Dept: OBSTETRICS AND GYNECOLOGY | Facility: CLINIC | Age: 34
End: 2019-08-12
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
## 2019-08-28 ENCOUNTER — OFFICE VISIT (OUTPATIENT)
Dept: SPORTS MEDICINE | Facility: CLINIC | Age: 34
End: 2019-08-28
Payer: COMMERCIAL

## 2019-08-28 ENCOUNTER — HOSPITAL ENCOUNTER (OUTPATIENT)
Dept: RADIOLOGY | Facility: HOSPITAL | Age: 34
Discharge: HOME | End: 2019-08-28
Attending: ORTHOPAEDIC SURGERY
Payer: COMMERCIAL

## 2019-08-28 VITALS
SYSTOLIC BLOOD PRESSURE: 120 MMHG | WEIGHT: 172 LBS | BODY MASS INDEX: 26.07 KG/M2 | HEIGHT: 68 IN | DIASTOLIC BLOOD PRESSURE: 72 MMHG

## 2019-08-28 DIAGNOSIS — M25.561 ACUTE PAIN OF RIGHT KNEE: ICD-10-CM

## 2019-08-28 DIAGNOSIS — M25.561 RIGHT KNEE PAIN, UNSPECIFIED CHRONICITY: ICD-10-CM

## 2019-08-28 DIAGNOSIS — S83.511A RUPTURE OF ANTERIOR CRUCIATE LIGAMENT OF RIGHT KNEE, INITIAL ENCOUNTER: Primary | ICD-10-CM

## 2019-08-28 PROCEDURE — 73564 XR KNEE ORTHO BILAT WITH FLEXION: ICD-10-PCS | Mod: 26,RT,, | Performed by: RADIOLOGY

## 2019-08-28 PROCEDURE — 73564 X-RAY EXAM KNEE 4 OR MORE: CPT | Mod: TC,50,FY,PO

## 2019-08-28 PROCEDURE — 99999 PR PBB SHADOW E&M-EST. PATIENT-LVL III: ICD-10-PCS | Mod: PBBFAC,,, | Performed by: ORTHOPAEDIC SURGERY

## 2019-08-28 PROCEDURE — 99214 OFFICE O/P EST MOD 30 MIN: CPT | Mod: S$GLB,,, | Performed by: ORTHOPAEDIC SURGERY

## 2019-08-28 PROCEDURE — 99999 PR PBB SHADOW E&M-EST. PATIENT-LVL III: CPT | Mod: PBBFAC,,, | Performed by: ORTHOPAEDIC SURGERY

## 2019-08-28 PROCEDURE — 73564 X-RAY EXAM KNEE 4 OR MORE: CPT | Mod: 26,RT,, | Performed by: RADIOLOGY

## 2019-08-28 PROCEDURE — 3008F PR BODY MASS INDEX (BMI) DOCUMENTED: ICD-10-PCS | Mod: CPTII,S$GLB,, | Performed by: ORTHOPAEDIC SURGERY

## 2019-08-28 PROCEDURE — 99214 PR OFFICE/OUTPT VISIT, EST, LEVL IV, 30-39 MIN: ICD-10-PCS | Mod: S$GLB,,, | Performed by: ORTHOPAEDIC SURGERY

## 2019-08-28 PROCEDURE — 3008F BODY MASS INDEX DOCD: CPT | Mod: CPTII,S$GLB,, | Performed by: ORTHOPAEDIC SURGERY

## 2019-08-28 PROCEDURE — 73564 X-RAY EXAM KNEE 4 OR MORE: CPT | Mod: 26,LT,, | Performed by: RADIOLOGY

## 2019-08-28 RX ORDER — CELECOXIB 200 MG/1
200 CAPSULE ORAL 2 TIMES DAILY
Qty: 60 CAPSULE | Refills: 6 | Status: SHIPPED | OUTPATIENT
Start: 2019-08-28 | End: 2019-09-27

## 2019-08-29 ENCOUNTER — TELEPHONE (OUTPATIENT)
Dept: SPORTS MEDICINE | Facility: CLINIC | Age: 34
End: 2019-08-29

## 2019-08-29 DIAGNOSIS — S83.511A RUPTURE OF ANTERIOR CRUCIATE LIGAMENT OF RIGHT KNEE, INITIAL ENCOUNTER: Primary | ICD-10-CM

## 2019-08-29 DIAGNOSIS — M23.91 INTERNAL DERANGEMENT OF RIGHT KNEE: ICD-10-CM

## 2019-08-29 NOTE — TELEPHONE ENCOUNTER
Patient rescheduled surgery from 9/26/19 to 10/10/19.     ----- Message from Chantal Emmanuel sent at 8/29/2019 10:35 AM CDT -----  Contact: Self   Patient called in regards to surgery scheduled for 9/26 unable to commit to surgery date, is requesting to reschedule to 10/10 if available       Pls call patient back at [phone]

## 2019-09-11 ENCOUNTER — PROCEDURE VISIT (OUTPATIENT)
Dept: OBSTETRICS AND GYNECOLOGY | Facility: CLINIC | Age: 34
End: 2019-09-11
Payer: COMMERCIAL

## 2019-09-11 VITALS — DIASTOLIC BLOOD PRESSURE: 79 MMHG | WEIGHT: 172 LBS | BODY MASS INDEX: 26.54 KG/M2 | SYSTOLIC BLOOD PRESSURE: 118 MMHG

## 2019-09-11 DIAGNOSIS — R87.810 ASCUS WITH POSITIVE HIGH RISK HPV CERVICAL: Primary | ICD-10-CM

## 2019-09-11 DIAGNOSIS — R87.619 ABNORMAL CERVICAL PAPANICOLAOU SMEAR, UNSPECIFIED ABNORMAL PAP FINDING: ICD-10-CM

## 2019-09-11 DIAGNOSIS — R87.610 ASCUS WITH POSITIVE HIGH RISK HPV CERVICAL: Primary | ICD-10-CM

## 2019-09-11 LAB
B-HCG UR QL: NEGATIVE
CTP QC/QA: YES

## 2019-09-11 PROCEDURE — 88342 IMHCHEM/IMCYTCHM 1ST ANTB: CPT | Mod: 26,,, | Performed by: PATHOLOGY

## 2019-09-11 PROCEDURE — 88305 TISSUE EXAM BY PATHOLOGIST: CPT | Mod: 26,,, | Performed by: PATHOLOGY

## 2019-09-11 PROCEDURE — 88342 IMHCHEM/IMCYTCHM 1ST ANTB: CPT | Mod: 59 | Performed by: PATHOLOGY

## 2019-09-11 PROCEDURE — 81025 POCT URINE PREGNANCY: ICD-10-PCS | Mod: S$GLB,,, | Performed by: OBSTETRICS & GYNECOLOGY

## 2019-09-11 PROCEDURE — 81025 URINE PREGNANCY TEST: CPT | Mod: S$GLB,,, | Performed by: OBSTETRICS & GYNECOLOGY

## 2019-09-11 PROCEDURE — 88342 TISSUE SPECIMEN TO PATHOLOGY, OBSTETRICS/GYNECOLOGY: ICD-10-PCS | Mod: 26,,, | Performed by: PATHOLOGY

## 2019-09-11 PROCEDURE — 88305 TISSUE EXAM BY PATHOLOGIST: CPT | Mod: 59 | Performed by: PATHOLOGY

## 2019-09-11 PROCEDURE — 88305 TISSUE SPECIMEN TO PATHOLOGY, OBSTETRICS/GYNECOLOGY: ICD-10-PCS | Mod: 26,,, | Performed by: PATHOLOGY

## 2019-09-11 NOTE — PROCEDURES
Colposcopy  Date/Time: 9/11/2019 10:27 AM  Performed by: Elissa Fallon MD  Authorized by: Elissa Fallon MD     Consent Done?:  Yes (Written)  Assistants?: No      Colposcopy Site:  Cervix  Position:  Supine  [place]hite Lesion? Yes    Atypical Vessels? Yes    Transformation Zone Adequate?: Yes    Biopsy?: Yes         Location:  Cervix ((6 00 and 10 00))  ECC Performed?: No    LEEP Performed?: No     Patient tolerated the procedure well with no immediate complications.   Post-operative instructions were provided for the patient.   Patient was discharged and will follow up if any complications occur

## 2019-09-18 ENCOUNTER — OFFICE VISIT (OUTPATIENT)
Dept: SPORTS MEDICINE | Facility: CLINIC | Age: 34
End: 2019-09-18
Payer: COMMERCIAL

## 2019-09-18 VITALS
DIASTOLIC BLOOD PRESSURE: 77 MMHG | HEART RATE: 62 BPM | HEIGHT: 68 IN | SYSTOLIC BLOOD PRESSURE: 128 MMHG | BODY MASS INDEX: 26.07 KG/M2 | WEIGHT: 172 LBS

## 2019-09-18 DIAGNOSIS — M23.91 INTERNAL DERANGEMENT OF RIGHT KNEE: ICD-10-CM

## 2019-09-18 DIAGNOSIS — S83.511D RUPTURE OF ANTERIOR CRUCIATE LIGAMENT OF RIGHT KNEE, SUBSEQUENT ENCOUNTER: Primary | ICD-10-CM

## 2019-09-18 PROCEDURE — 99999 PR PBB SHADOW E&M-EST. PATIENT-LVL III: CPT | Mod: PBBFAC,,, | Performed by: PHYSICIAN ASSISTANT

## 2019-09-18 PROCEDURE — 99499 UNLISTED E&M SERVICE: CPT | Mod: S$GLB,,, | Performed by: PHYSICIAN ASSISTANT

## 2019-09-18 PROCEDURE — 99999 PR PBB SHADOW E&M-EST. PATIENT-LVL III: ICD-10-PCS | Mod: PBBFAC,,, | Performed by: PHYSICIAN ASSISTANT

## 2019-09-18 PROCEDURE — 99499 NO LOS: ICD-10-PCS | Mod: S$GLB,,, | Performed by: PHYSICIAN ASSISTANT

## 2019-09-18 RX ORDER — CELECOXIB 200 MG/1
200 CAPSULE ORAL 2 TIMES DAILY
Qty: 60 CAPSULE | Refills: 2 | Status: SHIPPED | OUTPATIENT
Start: 2019-09-18 | End: 2019-11-18

## 2019-09-18 RX ORDER — SODIUM CHLORIDE 9 MG/ML
INJECTION, SOLUTION INTRAVENOUS CONTINUOUS
Status: CANCELLED | OUTPATIENT
Start: 2019-09-18

## 2019-09-18 RX ORDER — ROPIVACAINE/EPI/CLONIDINE/KET 2.46-0.005
SYRINGE (ML) INJECTION ONCE
Status: CANCELLED | OUTPATIENT
Start: 2019-09-18 | End: 2019-09-18

## 2019-09-18 RX ORDER — ASPIRIN 325 MG
325 TABLET, DELAYED RELEASE (ENTERIC COATED) ORAL DAILY
Qty: 42 TABLET | Refills: 0 | Status: SHIPPED | OUTPATIENT
Start: 2019-09-18 | End: 2021-02-03

## 2019-09-18 RX ORDER — PROMETHAZINE HYDROCHLORIDE 25 MG/1
25 TABLET ORAL EVERY 6 HOURS PRN
Qty: 30 TABLET | Refills: 0 | Status: SHIPPED | OUTPATIENT
Start: 2019-09-18 | End: 2019-10-18

## 2019-09-18 RX ORDER — OXYCODONE AND ACETAMINOPHEN 10; 325 MG/1; MG/1
1 TABLET ORAL EVERY 6 HOURS PRN
Qty: 28 TABLET | Refills: 0 | Status: SHIPPED | OUTPATIENT
Start: 2019-09-18 | End: 2020-01-02

## 2019-09-18 NOTE — H&P
Klarissa Brown  is here for a completion of her perioperative paperwork. she  Is scheduled to undergo 1.  Arthroscopic anterior cruciate ligament reconstruction, Right knee, hamstring autograft (8739)  2.  Arthroscopic chondroplasty, Right knee (81684)  3.  Arthroscopic partial synovectomy, Right knee (75128) on 10/10/2019.  She is a healthy individual and does not need clearance for this procedure.     Risks, indications and benefits of the surgical procedure were discussed with the patient. All questions with regard to surgery, rehab, expected return to functional activities, activities of daily living and recreational endeavors were answered to her satisfaction.    Patient was informed and understands the risks of surgery are greater for patients with a current condition or history of heart disease, obesity, clotting disorders, recurrent infections, steroid use, current or past smoking, and factors such as sedentary lifestyle and noncompliance with medications, therapy or follow-up. The degree of the increased risk is hard to estimate with any degree of precision.    Once no other questions were asked, a brief history and physical exam was then performed.    PAST MEDICAL HISTORY:   Past Medical History:   Diagnosis Date    HSV (herpes simplex virus) anogenital infection      PAST SURGICAL HISTORY:   Past Surgical History:   Procedure Laterality Date    HERNIA REPAIR      as 1 year old     FAMILY HISTORY:   Family History   Problem Relation Age of Onset    Sleep apnea Mother     Arthritis Mother         injuries from gymnastics; hypermobile joints    Anxiety disorder Mother     Depression Mother     Hypertension Father     Bipolar disorder Maternal Grandmother     Other Brother         hypomania but not bipolar d/o?    Cancer Neg Hx     Diabetes Neg Hx     Heart attack Neg Hx     Ovarian cancer Neg Hx     Breast cancer Neg Hx     Colon cancer Neg Hx      SOCIAL HISTORY:   Social History      Socioeconomic History    Marital status:      Spouse name: Not on file    Number of children: Not on file    Years of education: Not on file    Highest education level: Not on file   Occupational History    Not on file   Social Needs    Financial resource strain: Not on file    Food insecurity:     Worry: Not on file     Inability: Not on file    Transportation needs:     Medical: Not on file     Non-medical: Not on file   Tobacco Use    Smoking status: Never Smoker    Smokeless tobacco: Never Used   Substance and Sexual Activity    Alcohol use: Yes     Comment: 3-5 drinks/week    Drug use: No    Sexual activity: Yes     Partners: Male     Birth control/protection: None   Lifestyle    Physical activity:     Days per week: Not on file     Minutes per session: Not on file    Stress: Not on file   Relationships    Social connections:     Talks on phone: Not on file     Gets together: Not on file     Attends Christian service: Not on file     Active member of club or organization: Not on file     Attends meetings of clubs or organizations: Not on file     Relationship status: Not on file   Other Topics Concern    Not on file   Social History Narrative    Not on file       MEDICATIONS:   Current Outpatient Medications:     acyclovir (ZOVIRAX) 400 MG tablet, TAKE 1 TABLET (400 MG TOTAL) BY MOUTH 4 (FOUR) TIMES DAILY. FOR 5 DAYS, Disp: , Rfl: 12    celecoxib (CELEBREX) 200 MG capsule, Take 1 capsule (200 mg total) by mouth 2 (two) times daily., Disp: 60 capsule, Rfl: 6  ALLERGIES: Review of patient's allergies indicates:  No Known Allergies    Review of Systems   Constitution: Negative. Negative for chills, fever and night sweats.   HENT: Negative for congestion and headaches.    Eyes: Negative for blurred vision, left vision loss and right vision loss.   Cardiovascular: Negative for chest pain and syncope.   Respiratory: Negative for cough and shortness of breath.    Endocrine: Negative for  polydipsia, polyphagia and polyuria.   Hematologic/Lymphatic: Negative for bleeding problem. Does not bruise/bleed easily.   Skin: Negative for dry skin, itching and rash.   Musculoskeletal: Negative for falls and muscle weakness.   Gastrointestinal: Negative for abdominal pain and bowel incontinence.   Genitourinary: Negative for bladder incontinence and nocturia.   Neurological: Negative for disturbances in coordination, loss of balance and seizures.   Psychiatric/Behavioral: Negative for depression. The patient does not have insomnia.    Allergic/Immunologic: Negative for hives and persistent infections.     PHYSICAL EXAM:  GEN: A&Ox3, WD WN NAD  HEENT: WNL  CHEST: CTAB, no W/R/R  HEART: RRR, no M/R/G   ABD: Soft, NT ND, BS x4 QUADS  MS: Refer to previous note for detailed MS exam  NEURO: CN II-XII intact       The surgical consent was then reviewed with the patient, who agreed with all the contents of the consent form and it was signed. she was then given the [facility] surgery packet to bring with her to [facility] for the anesthesia portion of her perioperative paperwork.     PHYSICAL THERAPY:  She was also instructed regarding physical therapy and will begin POD # 1-3.at [facility].    POST OP CARE:instructions were reviewed including care of the wound and dressing after surgery and when she can shower.     PAIN MANAGEMENT: Klarissa Brown was also given a pain management regime, which includes the TENS unit given to her by Artur Perez along with the education required for its use. She was also instructed regarding the Polar ice unit that will be in place after surgery and her postoperative pain medications.     PAIN MEDICATION:  Percocet 10/325mg 1 po q 4-6 hours prn pain  Phenergan 25 mg one p.o. q.4-6 hours p.r.n. nausea and vomiting.  Celebrex 200 mg BID x 6 weeks post op  ASA 352mg once a day x 6 weeks post op  TO BE DELIVERED POST-OP BEDSIDE    Patient denies history of seizures.     Patient was  instructed to buy and take:  Aspirin 325mg daily x 6 weeks for DVT prophylaxis starting on the evening after surgery.  Patient will also use bilateral TEDs on lower extremities, SCDs during surgery, and early ambulation post-op. If the patient was previously taking 81mg baby aspirin, they were told to not take it will using the above stated aspirin and to restart the 81mg aspirin after completion of the aspirin dose.       Patient was also told to buy over the counter Prilosec medication and take it once daily for GI protection as long as they are taking NSAIDs or Aspirin.    DVT prophylaxis was discussed with the patient today including risk factors for developing DVTs and history of DVTs. The patient was asked if any specific recommendations were given from the doctor/s that did pre-operative surgical clearance.      Patient was asked if they were taking or using OCP pills or devices. If they answered yes, then they were instructed to stop using OCPs at this pre-operative appointment until 2 months post-op to help prevent DVT development. They understand that there are other forms of birth control that do not involve hormones. They expressed understanding that ignoring/not following this instruction could result in a DVT which could turn into a deadly pulmonary embolism.      The patient was told that narcotic pain medications may make them drowsy and instructions were given to not sign legal documents, drive or operate heavy machinery, cars, or equipment while under the influence of narcotic medications.     As there were no other questions to be asked, she was given my business card along with Lázaro Cody MD business card if she has any questions or concerns prior to surgery or in the postop period.

## 2019-09-19 ENCOUNTER — TELEPHONE (OUTPATIENT)
Dept: SPORTS MEDICINE | Facility: CLINIC | Age: 34
End: 2019-09-19

## 2019-09-19 NOTE — TELEPHONE ENCOUNTER
Rescheduled patient surgery with Dr. Cody from 10/10/19 to 10/17/19.       ----- Message from Radha Vasques MA sent at 9/19/2019  9:37 AM CDT -----  Contact: self/[phone]  Pt states she missed a call at 915 in regards to R/S her surgery

## 2019-09-25 ENCOUNTER — TELEPHONE (OUTPATIENT)
Dept: OBSTETRICS AND GYNECOLOGY | Facility: CLINIC | Age: 34
End: 2019-09-25

## 2019-09-25 NOTE — TELEPHONE ENCOUNTER
Called and discussed karlie II on biopsy.  Recommend LEEP.  Counseled on r/b/i/a.  Please schedule leep prior to 10/17.

## 2019-10-02 ENCOUNTER — TELEPHONE (OUTPATIENT)
Dept: SPORTS MEDICINE | Facility: CLINIC | Age: 34
End: 2019-10-02

## 2019-10-02 ENCOUNTER — ANESTHESIA EVENT (OUTPATIENT)
Dept: SURGERY | Facility: HOSPITAL | Age: 34
End: 2019-10-02
Payer: COMMERCIAL

## 2019-10-02 NOTE — PRE ADMISSION SCREENING
Anesthesia Assessment: Preoperative EQUATION    Planned Procedure: Procedure(s) (LRB):  RECONSTRUCTION, KNEE, ACL, ARTHROSCOPIC (Right)  ARTHROSCOPY, KNEE, WITH CHONDROPLASTY (Right)  SYNOVECTOMY, KNEE (Right)  Requested Anesthesia Type:General  Surgeon: Lázaro Cody MD  Service: Orthopedics  Known or anticipated Date of Surgery:10/10/2019    Surgeon notes: reviewed    Electronic QUestionnaire Assessment completed via nurse interview with patient.    Triage considerations:     The patient has no apparent active cardiac condition (No unstable coronary Syndrome such as severe unstable angina or recent [<1 month] myocardial infarction, decompensated CHF, severe valvular   disease or significant arrhythmia)    Previous anesthesia records:MAC, No problems and Not available    Tests already available:  N/A          Instructions given. (See in Nurse's note)    Optimization:  Anesthesia Preop Clinic Assessment  NOT Indicated        Plan:    Patient  has previously scheduled Medical Appointment:    Navigation:  Straight Line to surgery.               No tests, anesthesia preop clinic visit, or consult required.                  Plans per surgeon and Follow-up per Surgeon

## 2019-10-02 NOTE — TELEPHONE ENCOUNTER
LVM for patient explaining that  had family emergency and will not be performing surgery 10/17/19. Asked that patient return call to reschedule surgery with Dr. Cody for Thursday 10/10/19 ([place]).

## 2019-10-02 NOTE — PLAN OF CARE
Pt instructions given for surgical and medical guidelines.  NPO status reviewed with patient.  Patient verbalized understanding of both food and fluid intake prior to surgery.  Antibacterial scrub instructions given, per MD recommendations.  All questions answered.  Driving directions given for day of surgery.  Anticoagulants, OTC's and vitamins, and prescription medication instructions per protocol.  Nurse reviewed history as stated in chart.

## 2019-10-02 NOTE — TELEPHONE ENCOUNTER
Spoke to the patient and explained Dr. Cody had family emergency and will not perform surgery on 10/1719. Rescheduled patient surgery with Dr. Cody for Thursday 10/10/19 ([place]).

## 2019-10-03 ENCOUNTER — PATIENT MESSAGE (OUTPATIENT)
Dept: OBSTETRICS AND GYNECOLOGY | Facility: CLINIC | Age: 34
End: 2019-10-03

## 2019-10-03 ENCOUNTER — TELEPHONE (OUTPATIENT)
Dept: OBSTETRICS AND GYNECOLOGY | Facility: CLINIC | Age: 34
End: 2019-10-03

## 2019-10-03 NOTE — TELEPHONE ENCOUNTER
----- Message from Eloy Godoy MA sent at 10/3/2019  4:16 PM CDT -----  Contact: FATUMA ESPINOZA can you call this pt and schedule her for a leep for Monday? Dr. Fallon has spoken to Dr. Rivera concerning this so you just need to coordinate a time with her.  ----- Message -----  From: Peggy Duncan  Sent: 10/3/2019   3:45 PM CDT  To: [facility] DICK Staff    Name of Who is Calling: FATUMA ESPINOZA       What is the request in detail: Patient is requesting to schedule a procedure. Please contact to further advise.      Can the clinic reply by [facility]: NO      What Number to Call Back if not in [facility]: [phone]

## 2019-10-07 ENCOUNTER — PROCEDURE VISIT (OUTPATIENT)
Dept: OBSTETRICS AND GYNECOLOGY | Facility: CLINIC | Age: 34
End: 2019-10-07
Payer: COMMERCIAL

## 2019-10-07 VITALS — SYSTOLIC BLOOD PRESSURE: 110 MMHG | WEIGHT: 171.5 LBS | BODY MASS INDEX: 26.47 KG/M2 | DIASTOLIC BLOOD PRESSURE: 70 MMHG

## 2019-10-07 DIAGNOSIS — Z98.890 S/P LEEP OF CERVIX: ICD-10-CM

## 2019-10-07 DIAGNOSIS — D06.9 SEVERE DYSPLASIA OF CERVIX (CIN III): Primary | ICD-10-CM

## 2019-10-07 LAB
B-HCG UR QL: NEGATIVE
CTP QC/QA: YES

## 2019-10-07 PROCEDURE — 88342 IMHCHEM/IMCYTCHM 1ST ANTB: CPT | Performed by: PATHOLOGY

## 2019-10-07 PROCEDURE — 88307 TISSUE SPECIMEN TO PATHOLOGY: ICD-10-PCS | Mod: 26,,, | Performed by: PATHOLOGY

## 2019-10-07 PROCEDURE — 81025 POCT URINE PREGNANCY: ICD-10-PCS | Mod: S$GLB,,, | Performed by: OBSTETRICS & GYNECOLOGY

## 2019-10-07 PROCEDURE — 88307 TISSUE EXAM BY PATHOLOGIST: CPT | Performed by: PATHOLOGY

## 2019-10-07 PROCEDURE — 88342 TISSUE SPECIMEN TO PATHOLOGY: ICD-10-PCS | Mod: 26,,, | Performed by: PATHOLOGY

## 2019-10-07 PROCEDURE — 57460 BX OF CERVIX W/SCOPE LEEP: CPT | Mod: S$GLB,,, | Performed by: OBSTETRICS & GYNECOLOGY

## 2019-10-07 PROCEDURE — 57460 PR COLPOSCOPY,CERVIX W/ADJ VAG,W/LOOP BX: ICD-10-PCS | Mod: S$GLB,,, | Performed by: OBSTETRICS & GYNECOLOGY

## 2019-10-07 PROCEDURE — 88307 TISSUE EXAM BY PATHOLOGIST: CPT | Mod: 26,,, | Performed by: PATHOLOGY

## 2019-10-07 PROCEDURE — 81025 URINE PREGNANCY TEST: CPT | Mod: S$GLB,,, | Performed by: OBSTETRICS & GYNECOLOGY

## 2019-10-07 PROCEDURE — 88342 IMHCHEM/IMCYTCHM 1ST ANTB: CPT | Mod: 26,,, | Performed by: PATHOLOGY

## 2019-10-07 NOTE — PROCEDURES
Colposcopy  Date/Time: 10/7/2019 9:28 AM  Performed by: Tyson Rivera MD  Authorized by: Tyson Rivera MD     Consent Done?:  Yes (Written)  Assistants?: No      Colposcopy Site:  Cervix  Position:  Supine   Patient was prepped and draped in the normal sterile fashion.  Local anesthetic:  Lidocaine 1% with epinephrine  Anesthetic total (ml): 15  Acrowhite Lesion? Yes    Atypical Vessels? Yes    Transformation Zone Adequate?: Yes    Biopsy?: No    ECC Performed?: No    LEEP Performed?: Yes    Estimated blood loss (cc):  5   Patient tolerated the procedure well with no immediate complications.   Post-operative instructions were provided for the patient.   Patient was discharged and will follow up if any complications occur     LEEP performed in usual manner.  Lesion seen from 5-7 o'clock.  Entire lesion captured in LEEP specimen.  Bed made hemostatic using cautery and monsels.  Pt tolerated the procedure well.

## 2019-10-08 ENCOUNTER — PATIENT MESSAGE (OUTPATIENT)
Dept: OBSTETRICS AND GYNECOLOGY | Facility: CLINIC | Age: 34
End: 2019-10-08

## 2019-10-09 ENCOUNTER — TELEPHONE (OUTPATIENT)
Dept: SPORTS MEDICINE | Facility: CLINIC | Age: 34
End: 2019-10-09

## 2019-10-10 ENCOUNTER — ANESTHESIA (OUTPATIENT)
Dept: SURGERY | Facility: HOSPITAL | Age: 34
End: 2019-10-10
Payer: COMMERCIAL

## 2019-10-10 ENCOUNTER — HOSPITAL ENCOUNTER (OUTPATIENT)
Facility: HOSPITAL | Age: 34
Discharge: HOME | End: 2019-10-10
Attending: ORTHOPAEDIC SURGERY | Admitting: ORTHOPAEDIC SURGERY
Payer: COMMERCIAL

## 2019-10-10 DIAGNOSIS — S83.511S RUPTURE OF ANTERIOR CRUCIATE LIGAMENT OF RIGHT KNEE, SEQUELA: Primary | ICD-10-CM

## 2019-10-10 DIAGNOSIS — M23.91 INTERNAL DERANGEMENT OF RIGHT KNEE: ICD-10-CM

## 2019-10-10 DIAGNOSIS — S83.511D RUPTURE OF ANTERIOR CRUCIATE LIGAMENT OF RIGHT KNEE, SUBSEQUENT ENCOUNTER: ICD-10-CM

## 2019-10-10 DIAGNOSIS — M25.561 ACUTE PAIN OF RIGHT KNEE: ICD-10-CM

## 2019-10-10 LAB
B-HCG UR QL: NEGATIVE
CTP QC/QA: YES

## 2019-10-10 PROCEDURE — 27201423 OPTIME MED/SURG SUP & DEVICES STERILE SUPPLY: Performed by: ORTHOPAEDIC SURGERY

## 2019-10-10 PROCEDURE — 81025 URINE PREGNANCY TEST: CPT | Performed by: PHYSICIAN ASSISTANT

## 2019-10-10 PROCEDURE — 25000003 PHARM REV CODE 250: Performed by: ORTHOPAEDIC SURGERY

## 2019-10-10 PROCEDURE — 37000009 HC ANESTHESIA EA ADD 15 MINS: Performed by: ORTHOPAEDIC SURGERY

## 2019-10-10 PROCEDURE — 94761 N-INVAS EAR/PLS OXIMETRY MLT: CPT

## 2019-10-10 PROCEDURE — 76942 PERIPHERAL BLOCK: ICD-10-PCS | Mod: 26,,, | Performed by: ANESTHESIOLOGY

## 2019-10-10 PROCEDURE — 63600175 PHARM REV CODE 636 W HCPCS: Performed by: ANESTHESIOLOGY

## 2019-10-10 PROCEDURE — 63600175 PHARM REV CODE 636 W HCPCS: Performed by: PHYSICIAN ASSISTANT

## 2019-10-10 PROCEDURE — 64450: ICD-10-PCS | Mod: 59,RT,, | Performed by: ANESTHESIOLOGY

## 2019-10-10 PROCEDURE — 64450 NJX AA&/STRD OTHER PN/BRANCH: CPT | Performed by: ANESTHESIOLOGY

## 2019-10-10 PROCEDURE — 29888 ARTHRS AID ACL RPR/AGMNTJ: CPT | Mod: RT,,, | Performed by: ORTHOPAEDIC SURGERY

## 2019-10-10 PROCEDURE — 76942 ECHO GUIDE FOR BIOPSY: CPT | Mod: 26,,, | Performed by: ANESTHESIOLOGY

## 2019-10-10 PROCEDURE — 64448 PERIPHERAL BLOCK: ICD-10-PCS | Mod: 59,RT,, | Performed by: ANESTHESIOLOGY

## 2019-10-10 PROCEDURE — 25000003 PHARM REV CODE 250: Performed by: NURSE ANESTHETIST, CERTIFIED REGISTERED

## 2019-10-10 PROCEDURE — 27200651 HC AIRWAY, LMA: Performed by: NURSE ANESTHETIST, CERTIFIED REGISTERED

## 2019-10-10 PROCEDURE — D9220A PRA ANESTHESIA: Mod: ANES,,, | Performed by: ANESTHESIOLOGY

## 2019-10-10 PROCEDURE — 25000003 PHARM REV CODE 250: Performed by: ANESTHESIOLOGY

## 2019-10-10 PROCEDURE — D9220A PRA ANESTHESIA: ICD-10-PCS | Mod: ANES,,, | Performed by: ANESTHESIOLOGY

## 2019-10-10 PROCEDURE — 99900035 HC TECH TIME PER 15 MIN (STAT)

## 2019-10-10 PROCEDURE — 63600175 PHARM REV CODE 636 W HCPCS: Performed by: NURSE ANESTHETIST, CERTIFIED REGISTERED

## 2019-10-10 PROCEDURE — D9220A PRA ANESTHESIA: ICD-10-PCS | Mod: CRNA,,, | Performed by: NURSE ANESTHETIST, CERTIFIED REGISTERED

## 2019-10-10 PROCEDURE — 36000711: Performed by: ORTHOPAEDIC SURGERY

## 2019-10-10 PROCEDURE — 37000008 HC ANESTHESIA 1ST 15 MINUTES: Performed by: ORTHOPAEDIC SURGERY

## 2019-10-10 PROCEDURE — 29888 PR KNEE SCOPE,AID ANT CRUCIATE REPAIR: ICD-10-PCS | Mod: RT,,, | Performed by: ORTHOPAEDIC SURGERY

## 2019-10-10 PROCEDURE — 64448 NJX AA&/STRD FEM NRV NFS IMG: CPT | Performed by: ANESTHESIOLOGY

## 2019-10-10 PROCEDURE — 63600175 PHARM REV CODE 636 W HCPCS

## 2019-10-10 PROCEDURE — C1894 INTRO/SHEATH, NON-LASER: HCPCS | Performed by: ORTHOPAEDIC SURGERY

## 2019-10-10 PROCEDURE — 63600175 PHARM REV CODE 636 W HCPCS: Performed by: ORTHOPAEDIC SURGERY

## 2019-10-10 PROCEDURE — 25000003 PHARM REV CODE 250: Performed by: PHYSICIAN ASSISTANT

## 2019-10-10 PROCEDURE — D9220A PRA ANESTHESIA: Mod: CRNA,,, | Performed by: NURSE ANESTHETIST, CERTIFIED REGISTERED

## 2019-10-10 PROCEDURE — 71000033 HC RECOVERY, INTIAL HOUR: Performed by: ORTHOPAEDIC SURGERY

## 2019-10-10 PROCEDURE — C1713 ANCHOR/SCREW BN/BN,TIS/BN: HCPCS | Performed by: ORTHOPAEDIC SURGERY

## 2019-10-10 PROCEDURE — 36000710: Performed by: ORTHOPAEDIC SURGERY

## 2019-10-10 DEVICE — SCREW BIOINTRAFIX 8-10MMX30: Type: IMPLANTABLE DEVICE | Site: KNEE | Status: FUNCTIONAL

## 2019-10-10 DEVICE — LOOP CORT RIGID FIX ADJ STD: Type: IMPLANTABLE DEVICE | Site: KNEE | Status: FUNCTIONAL

## 2019-10-10 DEVICE — SHEATH SCREW LARGE 30MM: Type: IMPLANTABLE DEVICE | Site: KNEE | Status: FUNCTIONAL

## 2019-10-10 RX ORDER — TRAMADOL HYDROCHLORIDE 50 MG/1
50 TABLET ORAL EVERY 4 HOURS PRN
Status: DISCONTINUED | OUTPATIENT
Start: 2019-10-10 | End: 2019-10-10 | Stop reason: HOSPADM

## 2019-10-10 RX ORDER — ROPIVACAINE HYDROCHLORIDE 5 MG/ML
INJECTION, SOLUTION EPIDURAL; INFILTRATION; PERINEURAL
Status: COMPLETED | OUTPATIENT
Start: 2019-10-10 | End: 2019-10-10

## 2019-10-10 RX ORDER — KETAMINE HYDROCHLORIDE 10 MG/ML
INJECTION, SOLUTION INTRAMUSCULAR; INTRAVENOUS
Status: DISCONTINUED | OUTPATIENT
Start: 2019-10-10 | End: 2019-10-10

## 2019-10-10 RX ORDER — MIDAZOLAM HYDROCHLORIDE 1 MG/ML
0.5 INJECTION INTRAMUSCULAR; INTRAVENOUS
Status: DISCONTINUED | OUTPATIENT
Start: 2019-10-10 | End: 2019-10-10 | Stop reason: HOSPADM

## 2019-10-10 RX ORDER — ACETAMINOPHEN 500 MG
1000 TABLET ORAL ONCE
Status: COMPLETED | OUTPATIENT
Start: 2019-10-10 | End: 2019-10-10

## 2019-10-10 RX ORDER — ROPIVACAINE/EPI/CLONIDINE/KET 2.46-0.005
SYRINGE (ML) INJECTION ONCE
Status: COMPLETED | OUTPATIENT
Start: 2019-10-10 | End: 2019-10-10

## 2019-10-10 RX ORDER — SODIUM CHLORIDE 9 MG/ML
INJECTION, SOLUTION INTRAVENOUS CONTINUOUS
Status: DISCONTINUED | OUTPATIENT
Start: 2019-10-10 | End: 2019-10-10 | Stop reason: HOSPADM

## 2019-10-10 RX ORDER — FENTANYL CITRATE 50 UG/ML
25 INJECTION, SOLUTION INTRAMUSCULAR; INTRAVENOUS EVERY 5 MIN PRN
Status: DISCONTINUED | OUTPATIENT
Start: 2019-10-10 | End: 2019-10-10 | Stop reason: HOSPADM

## 2019-10-10 RX ORDER — ACETAMINOPHEN 10 MG/ML
INJECTION, SOLUTION INTRAVENOUS
Status: COMPLETED
Start: 2019-10-10 | End: 2019-10-10

## 2019-10-10 RX ORDER — METOCLOPRAMIDE HYDROCHLORIDE 5 MG/ML
5 INJECTION INTRAMUSCULAR; INTRAVENOUS EVERY 6 HOURS PRN
Status: DISCONTINUED | OUTPATIENT
Start: 2019-10-10 | End: 2019-10-10 | Stop reason: HOSPADM

## 2019-10-10 RX ORDER — VANCOMYCIN HYDROCHLORIDE 1 G/20ML
INJECTION, POWDER, LYOPHILIZED, FOR SOLUTION INTRAVENOUS
Status: DISCONTINUED | OUTPATIENT
Start: 2019-10-10 | End: 2019-10-10 | Stop reason: HOSPADM

## 2019-10-10 RX ORDER — OXYCODONE HYDROCHLORIDE 5 MG/1
5 TABLET ORAL EVERY 4 HOURS PRN
Status: DISCONTINUED | OUTPATIENT
Start: 2019-10-10 | End: 2019-10-10 | Stop reason: HOSPADM

## 2019-10-10 RX ORDER — ACETAMINOPHEN 500 MG
1000 TABLET ORAL EVERY 8 HOURS
Status: DISCONTINUED | OUTPATIENT
Start: 2019-10-10 | End: 2019-10-10 | Stop reason: HOSPADM

## 2019-10-10 RX ORDER — EPINEPHRINE 1 MG/ML
INJECTION, SOLUTION INTRACARDIAC; INTRAMUSCULAR; INTRAVENOUS; SUBCUTANEOUS
Status: DISCONTINUED | OUTPATIENT
Start: 2019-10-10 | End: 2019-10-10 | Stop reason: HOSPADM

## 2019-10-10 RX ORDER — OXYCODONE HYDROCHLORIDE 5 MG/1
5 TABLET ORAL
Status: DISCONTINUED | OUTPATIENT
Start: 2019-10-10 | End: 2019-10-10 | Stop reason: HOSPADM

## 2019-10-10 RX ORDER — CEFAZOLIN SODIUM 1 G/3ML
2 INJECTION, POWDER, FOR SOLUTION INTRAMUSCULAR; INTRAVENOUS
Status: DISCONTINUED | OUTPATIENT
Start: 2019-10-10 | End: 2019-10-10 | Stop reason: HOSPADM

## 2019-10-10 RX ORDER — SODIUM CHLORIDE 0.9 % (FLUSH) 0.9 %
3 SYRINGE (ML) INJECTION EVERY 6 HOURS
Status: DISCONTINUED | OUTPATIENT
Start: 2019-10-10 | End: 2019-10-10 | Stop reason: HOSPADM

## 2019-10-10 RX ORDER — OXYCODONE HYDROCHLORIDE 5 MG/1
10 TABLET ORAL EVERY 4 HOURS PRN
Status: DISCONTINUED | OUTPATIENT
Start: 2019-10-10 | End: 2019-10-10 | Stop reason: HOSPADM

## 2019-10-10 RX ORDER — ACETAMINOPHEN 10 MG/ML
1000 INJECTION, SOLUTION INTRAVENOUS ONCE
Status: COMPLETED | OUTPATIENT
Start: 2019-10-10 | End: 2019-10-10

## 2019-10-10 RX ORDER — PROPOFOL 10 MG/ML
VIAL (ML) INTRAVENOUS
Status: DISCONTINUED | OUTPATIENT
Start: 2019-10-10 | End: 2019-10-10

## 2019-10-10 RX ORDER — ONDANSETRON 2 MG/ML
INJECTION INTRAMUSCULAR; INTRAVENOUS
Status: DISCONTINUED | OUTPATIENT
Start: 2019-10-10 | End: 2019-10-10

## 2019-10-10 RX ORDER — CELECOXIB 200 MG/1
400 CAPSULE ORAL ONCE
Status: COMPLETED | OUTPATIENT
Start: 2019-10-10 | End: 2019-10-10

## 2019-10-10 RX ORDER — DEXAMETHASONE SODIUM PHOSPHATE 4 MG/ML
INJECTION, SOLUTION INTRA-ARTICULAR; INTRALESIONAL; INTRAMUSCULAR; INTRAVENOUS; SOFT TISSUE
Status: DISCONTINUED | OUTPATIENT
Start: 2019-10-10 | End: 2019-10-10

## 2019-10-10 RX ORDER — LIDOCAINE HCL/PF 100 MG/5ML
SYRINGE (ML) INTRAVENOUS
Status: DISCONTINUED | OUTPATIENT
Start: 2019-10-10 | End: 2019-10-10

## 2019-10-10 RX ADMIN — MIDAZOLAM HYDROCHLORIDE 2 MG: 1 INJECTION, SOLUTION INTRAMUSCULAR; INTRAVENOUS at 02:10

## 2019-10-10 RX ADMIN — ROPIVACAINE HYDROCHLORIDE 10 ML: 5 INJECTION, SOLUTION EPIDURAL; INFILTRATION; PERINEURAL at 02:10

## 2019-10-10 RX ADMIN — KETAMINE HYDROCHLORIDE 25 MG: 10 INJECTION INTRAMUSCULAR; INTRAVENOUS at 04:10

## 2019-10-10 RX ADMIN — PROPOFOL 30 MG: 10 INJECTION, EMULSION INTRAVENOUS at 04:10

## 2019-10-10 RX ADMIN — SODIUM CHLORIDE, SODIUM GLUCONATE, SODIUM ACETATE, POTASSIUM CHLORIDE, MAGNESIUM CHLORIDE, SODIUM PHOSPHATE, DIBASIC, AND POTASSIUM PHOSPHATE: .53; .5; .37; .037; .03; .012; .00082 INJECTION, SOLUTION INTRAVENOUS at 03:10

## 2019-10-10 RX ADMIN — ACETAMINOPHEN 1000 MG: 10 INJECTION, SOLUTION INTRAVENOUS at 06:10

## 2019-10-10 RX ADMIN — PROPOFOL 150 MG: 10 INJECTION, EMULSION INTRAVENOUS at 03:10

## 2019-10-10 RX ADMIN — ROPIVACAINE HYDROCHLORIDE: 2 INJECTION, SOLUTION EPIDURAL; INFILTRATION at 05:10

## 2019-10-10 RX ADMIN — PROPOFOL 50 MG: 10 INJECTION, EMULSION INTRAVENOUS at 04:10

## 2019-10-10 RX ADMIN — CELECOXIB 400 MG: 200 CAPSULE ORAL at 11:10

## 2019-10-10 RX ADMIN — SODIUM CHLORIDE 1000 ML: 0.9 INJECTION, SOLUTION INTRAVENOUS at 11:10

## 2019-10-10 RX ADMIN — PROPOFOL 100 MG: 10 INJECTION, EMULSION INTRAVENOUS at 04:10

## 2019-10-10 RX ADMIN — FENTANYL CITRATE 25 MCG: 50 INJECTION INTRAMUSCULAR; INTRAVENOUS at 05:10

## 2019-10-10 RX ADMIN — ROPIVACAINE HYDROCHLORIDE 20 ML: 5 INJECTION, SOLUTION EPIDURAL; INFILTRATION; PERINEURAL at 02:10

## 2019-10-10 RX ADMIN — ONDANSETRON 4 MG: 2 INJECTION INTRAMUSCULAR; INTRAVENOUS at 04:10

## 2019-10-10 RX ADMIN — KETAMINE HYDROCHLORIDE 25 MG: 10 INJECTION INTRAMUSCULAR; INTRAVENOUS at 03:10

## 2019-10-10 RX ADMIN — LIDOCAINE HYDROCHLORIDE 100 MG: 20 INJECTION, SOLUTION INTRAVENOUS at 03:10

## 2019-10-10 RX ADMIN — FENTANYL CITRATE 50 MCG: 50 INJECTION INTRAMUSCULAR; INTRAVENOUS at 02:10

## 2019-10-10 RX ADMIN — OXYCODONE HYDROCHLORIDE 10 MG: 5 TABLET ORAL at 05:10

## 2019-10-10 RX ADMIN — CEFAZOLIN 2 G: 330 INJECTION, POWDER, FOR SOLUTION INTRAMUSCULAR; INTRAVENOUS at 03:10

## 2019-10-10 RX ADMIN — PROPOFOL 20 MG: 10 INJECTION, EMULSION INTRAVENOUS at 05:10

## 2019-10-10 RX ADMIN — ACETAMINOPHEN 1000 MG: 500 TABLET ORAL at 11:10

## 2019-10-10 RX ADMIN — DEXAMETHASONE SODIUM PHOSPHATE 8 MG: 4 INJECTION, SOLUTION INTRAMUSCULAR; INTRAVENOUS at 03:10

## 2019-10-10 RX ADMIN — MIDAZOLAM HYDROCHLORIDE 1 MG: 1 INJECTION, SOLUTION INTRAMUSCULAR; INTRAVENOUS at 02:10

## 2019-10-10 RX ADMIN — Medication: at 03:10

## 2019-10-10 NOTE — DISCHARGE INSTRUCTIONS
[address], [place]                                                                                          [phone]                   Postoperative Instructions for Knee Surgery                 Your Surgery Included:   Open               Arthroscopic   [] Ligament Repair       [x] Diagnostic           [] ACL     [] PCL     [] MCL     [] PLLC      [] Synovectomy / Plica Removal [] Meniscal Cartilage Repair / Transplantation      [] Lysis of Adhesions / Manipulation [] Articular Cartilage Repair      [] Interval Release           [] Microfracture       [] OATS   [] ACI      [] Meniscectomy           [] Osteochondral Allograft      [] Meniscal Cartilage Repair  [] Patellar Realignment       [] Debridement / Chondroplasty         [] Lateral Release   [] Ligament Repair      [] Articular Cartilage Repair          [] Extensor Mechanism             []   Microfracture  []  OATS         []  Cartilage Biopsy [] Tendon Repair          [x] Ligament Reconstruction          [] Patella                  [] Quadriceps             [x]   ACL    []   PCL  [] High Tibial Osteotomy       [] PRP Arthrocentesis  [] Joint Replacement         [] Amniox Arthrocentesis           [] Unicompartmental   [] Patellofemoral                  Call our office ([phone]) immediately or message through [facility] if you experience any of the following:       Excessive bleeding or pus like drainage at the incision site       Uncontrollable pain not relieved by pain medication       Excessive swelling or redness at the incision site       Fever above 101.5 degrees not controlled with Tylenol or Motrin       Shortness of Breath or severe calf pain       Any foul odor or blistering from the surgery site    FOR EMERGENCIES: [facility] is the best way to contact us. If on the weekend, page the  at [phone] who will direct your call appropriately.    1.   Pain Management: A cold therapy cuff, pain  medications, local injections, TENs unit, and in some cases, regional anesthesia injections are used to manage your post-operative pain. The decision to use each of these options is based on their risks and benefits.     Medications: You were given one or more of the following medication prescriptions during your preoperative appointment. Follow the instructions on the bottles.     Narcotic Medication (usually Percocet, Roxicodone, or Norco): Begin taking the medication before your knee starts to hurt. Some patients do not like to take any medication, but if you wait until your pain is severe before taking, you will be very uncomfortable for several hours waiting for the narcotic to work. Always take with food.     Nausea / Vomiting: For this issue, we prescribe Phenergan or Zofran, use this medication as directed as needed for nausea.     Cold Therapy: You may have been sent home with a Texifter® cold therapy unit and wrap for your knee. Fill with ice and water to the indicated fill line. You can use 20-30 minutes on then off, several times a day. This will help relieve pain and control swelling. Do not sleep with on.     Regional Anesthesia Injections (Blocks): You may have been given a regional nerve block either before or after surgery. This may make your entire leg numb for 24-36 hours.                            * Proceed with caution when bearing weight on your leg.     2.   Diet: Eat a bland diet for the first day after surgery. Progress your diet as tolerated. Constipation may occur with Narcotic usage. We recommend Colace 100mg twice a day while taking narcotics.    3.   Activity: Limit your activity during the first 48 hours, keep your leg elevated with pillows under your heel. After the first 48 hours at home, increase your activity level based on your symptoms.    4.   Dressing: (b) The soft, bulky dressing will be removed on the 3rd day after surgery. The Aquacel (tan, long adhesive bandage) will  remain on until the 1st post operative appointment. Place waterproof bandages at this time. Keep wounds as dry as possible for first 2 weeks. It is normal for some blood to be seen on the dressings. It is also normal for you to see apparent bruising on the skin around your incisions. If you are concerned by the drainage or the appearance of your wound site, you can send a picture via [facility].    5.   Shower: (b) You may shower on the 3rd day after surgery. The Aquacel bandage is to be covered with saran wrap before showering. Do not get bandage wet. You may see a small incision with a suture. Place waterproof bandages  prior to shower. It is recommended to use Saran wrap before showering. Do not submerge limb in any water for 4 weeks or incisions completely healed.    6.   Your procedure required a T-scope, which will be locked into full extension while ambulating, and can be ranged from -10 to 130 degrees when laying down.    7.   Your procedure did not require a Continuous Passive Motion (CPM) device.    8.   Weight Bearing: You may have been sent home with crutches. If instructed (see below), use these crutches at all times unless at complete rest.      [x] Full weight bearing            [x]  NOW       9.  Knee Exercises: Begin these exercises the first day after surgery in order to help you regain your motion and strength. You may do the following marked exercises:     [x] Quad Sets - Begin activating your quadriceps muscle by driving your          knee downward into full knee extension while seated on a table or bed   with a towel rolled and propped under your heel     [x] Straight Leg Raise (SLR) - While shruthi your quadriceps muscle, lift     your fully extended leg to the level of your non-operative knee (as shown)     [x] Heel Slides - With the knee straight, slide your heel slowly toward your   buttocks, hold at the endpoint for 10-15 seconds, then slowly straighten     [x] Ankle pumps - With your  "knee straight, move your ankle in a "pumping"    fashion to activate your calf and leg muscles      10.  Physical Therapy: Physical therapy is an essential component to your recovery from surgery. Your physical therapy will start in 3 days.    FIRST POSTOPERATIVE VISIT: As scheduled.       "

## 2019-10-10 NOTE — ANESTHESIA POSTPROCEDURE EVALUATION
Anesthesia Post Evaluation    Patient: Klarissa Brown    Procedure(s) Performed: Procedure(s) (LRB):  RECONSTRUCTION, KNEE, ACL, ARTHROSCOPIC (Right)  ARTHROSCOPY, KNEE, WITH CHONDROPLASTY (Right)  SYNOVECTOMY, KNEE (Right)    Final Anesthesia Type: general  Patient location during evaluation: PACU  Patient participation: Yes- Able to Participate  Level of consciousness: awake and alert  Post-procedure vital signs: reviewed and stable  Pain management: adequate  Airway patency: patent  PONV status at discharge: No PONV  Anesthetic complications: no      Cardiovascular status: blood pressure returned to baseline  Respiratory status: unassisted, spontaneous ventilation and room air  Hydration status: euvolemic  Follow-up not needed.          Vitals Value Taken Time   /75 10/10/2019  6:32 PM   Temp 36.7 °C (98 °F) 10/10/2019 10:58 AM   Pulse 70 10/10/2019  6:35 PM   Resp 21 10/10/2019  6:35 PM   SpO2 100 % 10/10/2019  6:35 PM   Vitals shown include unvalidated device data.      Event Time     Out of Recovery 17:30:00          Pain/Emilie Score: Pain Rating Prior to Med Admin: 7 (10/10/2019  6:08 PM)  Emilie Score: 10 (10/10/2019  5:30 PM)

## 2019-10-10 NOTE — TRANSFER OF CARE
"Anesthesia Transfer of Care Note    Patient: Klarissa Brown    Procedure(s) Performed: Procedure(s) (LRB):  RECONSTRUCTION, KNEE, ACL, ARTHROSCOPIC (Right)  ARTHROSCOPY, KNEE, WITH CHONDROPLASTY (Right)  SYNOVECTOMY, KNEE (Right)    Patient location: PACU    Anesthesia Type: general    Transport from OR: Transported from OR on 6-10 L/min O2 by face mask with adequate spontaneous ventilation    Post pain: adequate analgesia    Post assessment: no apparent anesthetic complications and tolerated procedure well    Post vital signs: stable    Level of consciousness: awake, alert and oriented    Nausea/Vomiting: no nausea/vomiting    Complications: none    Transfer of care protocol was followed      Last vitals:   Visit Vitals  BP (!) 115/59 (BP Location: Left arm, Patient Position: Lying)   Pulse (!) 56   Temp 36.7 °C (98 °F) (Oral)   Resp 16   Ht 5' 7.5" (1.715 m)   Wt 77.1 kg (170 lb)   LMP 10/03/2019   SpO2 100%   Breastfeeding? No   BMI 26.23 kg/m²     "

## 2019-10-10 NOTE — ANESTHESIA PROCEDURE NOTES
I PACK    Patient location during procedure: pre-op   Block not for primary anesthetic.  Reason for block: at surgeon's request and post-op pain management   Post-op Pain Location: right knee  Start time: 10/10/2019 2:25 PM  Timeout: 10/10/2019 2:20 PM   End time: 10/10/2019 2:31 PM    Staffing  Authorizing Provider: Adamaris Thomas MD  Performing Provider: Adamaris Thomas MD    Preanesthetic Checklist  Completed: patient identified, site marked, surgical consent, pre-op evaluation, timeout performed, IV checked, risks and benefits discussed and monitors and equipment checked  Peripheral Block  Patient position: supine  Prep: ChloraPrep  Patient monitoring: heart rate, cardiac monitor, continuous pulse ox, continuous capnometry and frequent blood pressure checks  Block type: I PACK  Laterality: right  Injection technique: single shot  Needle  Needle type: Stimuplex   Needle gauge: 22 G  Needle length: 4 in  Needle localization: anatomical landmarks     Assessment  Injection assessment: negative aspiration  Paresthesia pain: none  Heart rate change: no  Slow fractionated injection: no

## 2019-10-10 NOTE — BRIEF OP NOTE
[facility]  Brief Operative Note     SUMMARY     Surgery Date: 10/10/2019     Surgeon(s) and Role:     * Lázaro Cody MD - Primary    Assisting Surgeon: None    Pre-op Diagnosis:  Rupture of anterior cruciate ligament of right knee, initial encounter [S83.511A]  Internal derangement of right knee [M23.91]    Post-op Diagnosis:  Post-Op Diagnosis Codes:     * Rupture of anterior cruciate ligament of right knee, initial encounter [S83.511A]     * Internal derangement of right knee [M23.91]    Procedure(s) (LRB):  RECONSTRUCTION, KNEE, ACL, ARTHROSCOPIC (Right)  ARTHROSCOPY, KNEE, WITH CHONDROPLASTY (Right)  SYNOVECTOMY, KNEE (Right)    Anesthesia: General    Description of the findings of the procedure:   1.  Right knee arthroscopic anterior cruciate ligament reconstruction (double bundle), using hamstring autograft    Findings/Key Components:   See Op Note    Estimated Blood Loss: * No values recorded between 10/10/2019  3:33 PM and 10/10/2019  4:58 PM *         Specimens:   Specimen (12h ago, onward)    None          Discharge Note    SUMMARY     Admit Date: 10/10/2019    Discharge Date and Time:  10/10/2019 4:59 PM    Hospital Course (synopsis of major diagnoses, care, treatment, and services provided during the course of the hospital stay): Home from PACU     Final Diagnosis: Post-Op Diagnosis Codes:     * Rupture of anterior cruciate ligament of right knee, initial encounter [S83.511A]     * Internal derangement of right knee [M23.91]    Disposition: Home or Self Care    Follow Up/Patient Instructions:     Medications:  Reconciled Home Medications:      Medication List      CONTINUE taking these medications    acyclovir 400 MG tablet  Commonly known as:  ZOVIRAX  TAKE 1 TABLET (400 MG TOTAL) BY MOUTH 4 (FOUR) TIMES DAILY. FOR 5 DAYS     aspirin 325 MG EC tablet  Commonly known as:  ECOTRIN  Take 1 tablet (325 mg total) by mouth once daily.     celecoxib 200 MG capsule  Commonly known as:   "CeleBREX  Take 1 capsule (200 mg total) by mouth 2 (two) times daily.     oxyCODONE-acetaminophen  mg per tablet  Commonly known as:  PERCOCET  Take 1 tablet by mouth every 6 (six) hours as needed for Pain     promethazine 25 MG tablet  Commonly known as:  PHENERGAN  Take 1 tablet (25 mg total) by mouth every 6 (six) hours as needed (post-op -deliver bedside at [facility]).          Discharge Procedure Orders   CRUTCHES FOR HOME USE     Order Specific Question Answer Comments   Type: Axillary    Height: 5' 7.5" (1.715 m)    Weight: 77.1 kg (170 lb)    Length of need (1-99 months): 99      Diet general     Call MD for:  temperature >100.4     Call MD for:  persistent nausea and vomiting     Call MD for:  severe uncontrolled pain     Call MD for:  difficulty breathing, headache or visual disturbances     Call MD for:  redness, tenderness, or signs of infection (pain, swelling, redness, odor or green/yellow discharge around incision site)     Call MD for:  hives     Call MD for:  persistent dizziness or light-headedness     Call MD for:  extreme fatigue     Weight bearing as tolerated   Order Comments: With knee brace locked in full extension.       "

## 2019-10-10 NOTE — INTERVAL H&P NOTE
The patient has been examined and the H&P has been reviewed:    I concur with the findings and no changes have occurred since H&P was written.    Anesthesia/Surgery risks, benefits and alternative options discussed and understood by patient/family.          Active Hospital Problems    Diagnosis  POA    Rupture of anterior cruciate ligament of right knee [S83.271A]  Yes      Resolved Hospital Problems   No resolved problems to display.

## 2019-10-10 NOTE — ANESTHESIA PROCEDURE NOTES
Peripheral Block    Patient location during procedure: pre-op   Block not for primary anesthetic.  Reason for block: at surgeon's request and post-op pain management   Post-op Pain Location: right knee  Start time: 10/10/2019 2:25 PM  Timeout: 10/10/2019 2:24 PM   End time: 10/10/2019 2:31 PM    Staffing  Authorizing Provider: Adamaris Thomas MD  Performing Provider: Adamaris Thomas MD    Preanesthetic Checklist  Completed: patient identified, site marked, surgical consent, pre-op evaluation, timeout performed, IV checked, risks and benefits discussed and monitors and equipment checked  Peripheral Block  Patient position: supine  Prep: ChloraPrep and site prepped and draped  Patient monitoring: heart rate, cardiac monitor, continuous pulse ox, continuous capnometry and frequent blood pressure checks  Block type: adductor canal  Laterality: right  Injection technique: continuous  Needle  Needle type: Tuohy   Needle gauge: 17 G  Needle length: 3.5 in  Needle localization: anatomical landmarks and ultrasound guidance  Catheter type: spring wound  Catheter size: 19 G  Test dose: lidocaine 1.5% with Epi 1-to-200,000 and negative   -ultrasound image captured on disc.  Assessment  Injection assessment: negative aspiration, negative parasthesia and local visualized surrounding nerve  Paresthesia pain: none  Heart rate change: no  Slow fractionated injection: yes  Additional Notes  VSS.  [facility] RN monitoring vitals throughout procedure.  Patient tolerated procedure well.     Ropivacaine 0.25% 20 ml total injected

## 2019-10-11 ENCOUNTER — TELEPHONE (OUTPATIENT)
Dept: SPORTS MEDICINE | Facility: CLINIC | Age: 34
End: 2019-10-11

## 2019-10-11 VITALS
TEMPERATURE: 98 F | WEIGHT: 170 LBS | SYSTOLIC BLOOD PRESSURE: 122 MMHG | HEIGHT: 68 IN | OXYGEN SATURATION: 99 % | RESPIRATION RATE: 20 BRPM | BODY MASS INDEX: 25.76 KG/M2 | DIASTOLIC BLOOD PRESSURE: 81 MMHG | HEART RATE: 73 BPM

## 2019-10-11 NOTE — TELEPHONE ENCOUNTER
Spoke to the patient regarding her post operative concerns. Answered questions bout TENS, Medications, PT and how to reach our office.    Pt should take no more than 3000mg Tylenol daily including the Percocet.    She may use the TENS as much as she wants.    The RICE method should be used to help with inflammation.    Pt should take dressings off at 3 days post op. Leave Aquacel on until 2 week post op    Explained how to take brace off if need and how to lock/unlock brace when exercises are completed    Informed patient to use [employer] to contact us. Explained where she can find the on-call number if she needs it over the weeked.      ----- Message from Ben Morillo sent at 10/11/2019 11:57 AM CDT -----  Contact: patient   Please call pt at [phone]    Patient has some post op questions (non-urgent)    Thank you

## 2019-10-11 NOTE — PLAN OF CARE
Discharged to home with family. Pain controlled. Denies nausea. AVS reviewed and copy given. Consents in chart

## 2019-10-11 NOTE — PROGRESS NOTES
10/11/2019  1250    Patient/caretaker called at home.  Pain controlled with OnQ.  Patient denies signs of local anesthetic toxicity.  Dressing clean , dry, and intact.  All questions answered.  Encouraged to call if any issues arise.

## 2019-10-11 NOTE — TELEPHONE ENCOUNTER
The patient was contacted regarding their call to the office earlier and a voice mail was left to call the office back at their convenience.      LVM requesting call back to discuss post op questions.

## 2019-10-12 NOTE — ANESTHESIA POST-OP PAIN MANAGEMENT
10/12/2019  Jay  Called [phone].  Patient's  Jay picked up.  We discussed patient being in a hyperextension brace and having pain in the back of the knee.  We discussed that the adductor PNC will not be covering the back of that knee and that the patient should be taking the oral pain meds as much as prescribed and needed.  Denies tinnitus, metallic taste in mouth, weakness in extremity.  Denies erythema, warmth, tenderness, bleeding at site of catheter entry.  Dressing c/d/i.  We discussed that catheter needs to be removed today and to look for the blue tip at the end of the catheter to signify that everything came out in one piece.  All questions answered.  Encouraged patient and patient's  to call the provided number if any issues arise.    Jovany Lozano MD PGY4/CA3  Anesthesiology  [facility]  Pager: [phone]  Acute Pain Service / [facility]  Spectra: [phone]

## 2019-10-12 NOTE — ADDENDUM NOTE
Addendum  created 10/12/19 1337 by Jovany Lozano MD    Intraprocedure Event edited, Sign clinical note

## 2019-10-17 ENCOUNTER — PATIENT MESSAGE (OUTPATIENT)
Dept: OBSTETRICS AND GYNECOLOGY | Facility: HOSPITAL | Age: 34
End: 2019-10-17

## 2019-10-21 ENCOUNTER — OFFICE VISIT (OUTPATIENT)
Dept: SPORTS MEDICINE | Facility: CLINIC | Age: 34
End: 2019-10-21
Payer: COMMERCIAL

## 2019-10-21 ENCOUNTER — HOSPITAL ENCOUNTER (OUTPATIENT)
Dept: RADIOLOGY | Facility: HOSPITAL | Age: 34
Discharge: HOME | End: 2019-10-21
Attending: PHYSICIAN ASSISTANT
Payer: COMMERCIAL

## 2019-10-21 VITALS
BODY MASS INDEX: 25.76 KG/M2 | HEART RATE: 87 BPM | DIASTOLIC BLOOD PRESSURE: 77 MMHG | SYSTOLIC BLOOD PRESSURE: 130 MMHG | WEIGHT: 170 LBS | HEIGHT: 68 IN

## 2019-10-21 DIAGNOSIS — M25.561 RIGHT KNEE PAIN, UNSPECIFIED CHRONICITY: Primary | ICD-10-CM

## 2019-10-21 DIAGNOSIS — M25.561 RIGHT KNEE PAIN, UNSPECIFIED CHRONICITY: ICD-10-CM

## 2019-10-21 DIAGNOSIS — Z98.890 S/P ACL RECONSTRUCTION: ICD-10-CM

## 2019-10-21 PROCEDURE — 99999 PR PBB SHADOW E&M-EST. PATIENT-LVL III: CPT | Mod: PBBFAC,,, | Performed by: PHYSICIAN ASSISTANT

## 2019-10-21 PROCEDURE — 99999 PR PBB SHADOW E&M-EST. PATIENT-LVL III: ICD-10-PCS | Mod: PBBFAC,,, | Performed by: PHYSICIAN ASSISTANT

## 2019-10-21 PROCEDURE — 99024 POSTOP FOLLOW-UP VISIT: CPT | Mod: S$GLB,,, | Performed by: PHYSICIAN ASSISTANT

## 2019-10-21 PROCEDURE — 73560 X-RAY EXAM OF KNEE 1 OR 2: CPT | Mod: TC,RT

## 2019-10-21 PROCEDURE — 73560 XR KNEE 1 OR 2 VIEW RIGHT: ICD-10-PCS | Mod: 26,RT,, | Performed by: RADIOLOGY

## 2019-10-21 PROCEDURE — 99024 PR POST-OP FOLLOW-UP VISIT: ICD-10-PCS | Mod: S$GLB,,, | Performed by: PHYSICIAN ASSISTANT

## 2019-10-21 PROCEDURE — 73560 X-RAY EXAM OF KNEE 1 OR 2: CPT | Mod: 26,RT,, | Performed by: RADIOLOGY

## 2019-10-21 NOTE — PROGRESS NOTES
Subjective:          Chief Complaint: Klarissa Brown is a 34 y.o. female who had concerns including Post-op Evaluation of the Right Knee.    HPI   Patient presents to clinic for 2 week post-op evaluation of right knee. Pain today is 2/10. Denies nausea, vomiting, fever, chills, CP, and SOB. Ambulating with T-scope brace locked in extension and WBAT. She has been attending formal PT 2x a week at [facility]. She is currently taking ASA once a day and Celebrex 200mg BID. She is no longer taking pain medication.     DATE OF PROCEDURE: 10/10/2019  ATTENDING SURGEON: Surgeon(s) and Role:     * Lázaro Cody MD - Primary     Assistants:  Hill Smith MD - Fellow  Katt Smith PA-C     PREOPERATIVE DIAGNOSIS:  Right  Anterior Cruciate Ligament Tear S83.510     POSTOPERATIVE DIAGNOSIS:   Right  Anterior Cruciate Ligament Tear S83.510     PROCEDURES(S) PERFORMED:   1. Right  Arthroscopy, anterior cruciate ligament reconstruction 64840  2.  Right  Arthroscopy, knee, synovectomy, limited 74604    Review of Systems   Constitution: Negative. Negative for chills, fever, weight gain and weight loss.   HENT: Negative for congestion and sore throat.    Eyes: Negative for blurred vision and double vision.   Cardiovascular: Negative for chest pain, leg swelling and palpitations.   Respiratory: Negative for cough and shortness of breath.    Hematologic/Lymphatic: Does not bruise/bleed easily.   Skin: Negative for itching, poor wound healing and rash.   Musculoskeletal: Positive for joint pain, joint swelling and stiffness. Negative for back pain, muscle weakness and myalgias.   Gastrointestinal: Negative for abdominal pain, constipation, diarrhea, nausea and vomiting.   Genitourinary: Negative.  Negative for frequency and hematuria.   Neurological: Negative for dizziness, headaches, numbness, paresthesias and sensory change.   Psychiatric/Behavioral: Negative for altered mental status and depression. The patient is not  nervous/anxious.    Allergic/Immunologic: Negative for hives.                   Objective:        General: Klarissa is well-developed, well-nourished, appears stated age, in no acute distress, alert and oriented to time, place and person.     General    Vitals reviewed.  Constitutional: She is oriented to person, place, and time. She appears well-developed and well-nourished. No distress.   Neck: Normal range of motion.   Cardiovascular: Normal rate and regular rhythm.    Pulmonary/Chest: Effort normal. No respiratory distress.   Neurological: She is alert and oriented to person, place, and time.   Psychiatric: She has a normal mood and affect. Her behavior is normal. Thought content normal.     General Musculoskeletal Exam   Gait: abnormal and antalgic       Right Knee Exam     Inspection   Erythema: absent  Scars: present  Swelling: present  Effusion: present  Deformity: absent  Bruising: present    Tenderness   The patient is experiencing no tenderness.     Range of Motion   Extension: 0   Flexion: 100     Tests   Ligament Examination Lachman: normal (-1 to 2mm) PCL-Posterior Drawer: normal (0 to 2mm)     MCL - Valgus: normal (0 to 2mm)  LCL - Varus: normalPivot Shift: normal (Equal)Reverse Pivot Shift: normal (Equal)    Other   Sensation: normal    Comments:  Portal sutures removed. No signs of infection or necrosis. No purulent drainage. NVI.     Muscle Strength   Right Lower Extremity   Hip Abduction: 5/5   Quadriceps:  4/5   Hamstrin/5     RADIOGRAPHS:  Right knee:  FINDINGS:  Two views: There is ACL repair.  No acute fracture dislocation bone destruction or complication seen.        Assessment:       Encounter Diagnoses   Name Primary?    Right knee pain, unspecified chronicity Yes    S/P ACL reconstruction           Plan:       1. Provided patient with operative note.  2. Removed portal sutures. Incisions healing well. Suture tags trimmed. No signs of infection or necrosis.   3. May shower now without  covering incisions.  4. Continue  mg once a day and continue celebrex  5. Continue PT per protocol.  6. RTC in 4 weeks with Dr. Lázaro Cody for 6 week post op appt.  7. PHYSICAL THERAPY:  The patient should begin physical therapy on postoperative   day #3 and will be advanced to outpatient therapy as soon as   Possible following discharge.     Weight bearing:as tolerated  right leg  Range of Motion:Full normal motion symmetric to opposite side     No CPM required due to procedure performed   to begin quad sets with a heel roll to obtain hyperextension, straight leg raise and heel slides with the heel supported in a closed-chain fashion     Immediate specific exercises should include:   Gait program should include the above stated weightbearing; in addition: active extension with a heel-to-toe gait working on maintaining extension     Immobilizer if present should be locked @-10 degrees with gait and allowed to flex to 120 degrees at rest.                     Patient questionnaires may have been collected.

## 2019-10-22 ENCOUNTER — PATIENT MESSAGE (OUTPATIENT)
Dept: SPORTS MEDICINE | Facility: CLINIC | Age: 34
End: 2019-10-22

## 2019-11-05 ENCOUNTER — PATIENT MESSAGE (OUTPATIENT)
Dept: SPORTS MEDICINE | Facility: CLINIC | Age: 34
End: 2019-11-05

## 2019-11-06 DIAGNOSIS — Z98.890 S/P ACL RECONSTRUCTION: Primary | ICD-10-CM

## 2019-11-14 ENCOUNTER — CLINICAL SUPPORT (OUTPATIENT)
Dept: REHABILITATION | Facility: HOSPITAL | Age: 34
End: 2019-11-14
Attending: PHYSICIAN ASSISTANT
Payer: COMMERCIAL

## 2019-11-14 DIAGNOSIS — M25.561 ACUTE PAIN OF RIGHT KNEE: Primary | ICD-10-CM

## 2019-11-14 DIAGNOSIS — M25.661 DECREASED RANGE OF MOTION (ROM) OF RIGHT KNEE: ICD-10-CM

## 2019-11-14 DIAGNOSIS — M62.81 MUSCLE WEAKNESS OF LOWER EXTREMITY: ICD-10-CM

## 2019-11-14 PROCEDURE — 97161 PT EVAL LOW COMPLEX 20 MIN: CPT | Performed by: PHYSICAL THERAPIST

## 2019-11-14 PROCEDURE — 97110 THERAPEUTIC EXERCISES: CPT | Performed by: PHYSICAL THERAPIST

## 2019-11-14 NOTE — PLAN OF CARE
[facility]  Physical Therapy Initial Evaluation    Name: Klarissa Brown  Clinic Number: [id number]    Therapy Diagnosis: knee pain, knee stiffness, muscle weakness, decreased functional status    Physician: Katt Smith, RAFAELA Cody     PROCEDURES(S) PERFORMED:   1. Right  Arthroscopy, anterior cruciate ligament reconstruction 84112  2.  Right  Arthroscopy, knee, synovectomy, limited 06223    GRAFT SOURCE:  Hamstring auto graft       DATE OF PROCEDURE: 10/10/2019         Physician Orders: PT Eval and Treat   Medical Diagnosis: Z98.890 (ICD-10-CM) - S/P ACL reconstruction  Evaluation Date: 11/14/2019  Authorization Period Expiration: 12/31/2019  Plan of Care Certification Period: 8/14/2020  Visit # / Visits authorized: 1/ 20    Time In: 15:0  Time Out: 16:00  Total Billable Time: 60 minutes    Precautions: Standard + PHYSICAL THERAPY:  The patient should begin physical therapy on postoperative   day #3 and will be advanced to outpatient therapy as soon as   Possible following discharge.     Weight bearing:as tolerated  right leg  Range of Motion:Full normal motion symmetric to opposite side     No CPM required due to procedure performed   to begin quad sets with a heel roll to obtain hyperextension, straight leg raise and heel slides with the heel supported in a closed-chain fashion     Immediate specific exercises should include:   Gait program should include the above stated weightbearing; in addition: active extension with a heel-to-toe gait working on maintaining extension     Immobilizer if present should be locked @-10 degrees with gait and allowed to flex to 120 degrees at rest.          to begin quad sets with a heel roll to obtain hyperextension, straight leg raise and heel slides with the heel supported in a closed-chain fashion     Immediate specific exercises should include:   Gait program should include the above stated weightbearing; in  Medication(s):  "Preoperative Epic medication regimen          Subjective   Date of onset:  8/2019  History of current condition - Klarissa reports injuring her R knee surfing leading to above procedure     Past Medical History:   Diagnosis Date    HSV (herpes simplex virus) anogenital infection      Klarissa Brown  has a past surgical history that includes Hernia repair; Knee arthroscopy w/ ACL reconstruction (Right, 10/10/2019); Arthroscopic chondroplasty of knee joint (Right, 10/10/2019); and Synovectomy of knee (Right, 10/10/2019).    Klarissa has a current medication list which includes the following prescription(s): acyclovir, aspirin, celecoxib, and oxycodone-acetaminophen.    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   Prior Level of Function: I  Current Level of Function: difficulty with ADLs, sports    Pts goals: "get back to as active as I was"      Objective     Observation: enters PT WBAT with one axillary crutch with knee locked in brace, min effusion, min quad atrophy, well healed incision     Range of Motion (extension/neutral/flexion):    Right Left   Knee PROM: 2 / 0 /75 degrees 3 / 0 / 145 degrees     Strength:    Right Left Comment   Knee Extension: 3+/5 5/5    Knee Flexion: NT secondary to surgery   5/5    Hip Abduction: NT at this time  NT at this time       Special Tests: NT secondary to surgery  Palpation: (-) TTP with no increase in skin temp       TREATMENT   Treatment Time In: 15:30  Treatment Time Out: 16:00  Total Treatment time separate from Evaluation time: 15'    Klarissa received therapeutic exercises to develop strength, endurance, ROM and flexibility for 15 minutes including:    Supine RF stretch 5x:15  Self heel slides 1x15  Prone self ROM k' flex + RF and prone " 5x:15   Bike x 10' 3.5  Retro TM 5% 0.8 x 5'   Gait training series   HSS 5x:15  QS 1x10:10  Prone ext hang 3'x2 0#       Education     Home Exercises and Patient Education Provided    Education provided re:   - progress towards goals   - role of therapy in multi - disciplinary team, goals for therapy  No spiritual or educational barriers to learning provided    Written Home Exercise Provided: yes.  Exercises were reviewed and Klarissa was able to demonstrate them prior to the end of the session.   Pt received a written copy of exercises to perform at home. Klarissa demonstrated good  understanding of the education provided.     Assessment   Klarissa is a 34 y.o. female referred to outpatient Physical Therapy with a medical diagnosis of s/p R Knee ACL recon ham auto. Pt presents with       Stiffness  Swelling  Decreased ROM  Decreased strength  Gait difficulty   Decreased functional status       Pt prognosis is Good.   Pt will benefit from skilled outpatient Physical Therapy to address the deficits stated above and in the chart below, provide pt/family education, and to maximize pt's level of independence.     Plan of care discussed with patient: Yes  Pt's spiritual, cultural and educational needs considered and pt agreeable to plan of care and goals as stated below:     Anticipated Barriers for therapy: none     Medical Necessity is demonstrated by the following  History  Co-morbidities and personal factors that may impact the plan of care Co-morbidities:   None    Personal Factors:   no deficits     low   Examination  Body Structures and Functions, activity limitations and participation restrictions that may impact the plan of care Body Regions:   lower extremities    Body Systems:    ROM  strength  gross coordinated movement  balance  gait  motor control  edema  scar formation    Participation Restrictions:   ADLs  Sports     Activity limitations:   Mobility  walking    Self care  washing oneself (bathing, drying, washing  hands)  toileting  dressing    Domestic Life  shopping  cooking  doing house work (cleaning house, washing dishes, laundry)    Community and Social Life  recreation and leisure         moderate   Clinical Presentation stable and uncomplicated low   Decision Making/ Complexity Score: low     Goals     Short-Term Goals: 6  weeks  - The patient will be independent with initial home exercise program.  - The patient is independent with donning/doffing brace to protect tissue healing.  - The patient will be independent amb with crutches on level tile for household distances.  - The patient will increase ROM by 15 degrees to perform ambulation and bathing and hygiene with pain < 0/10.  - The patient will increase strength by 1/2 muscle grade to perform ambulation and bathing and hygiene with pain < 0/10.    Long-Term Goals: 36 weeks  - The patient will be independent with home exercise program and symptom management.  - The patient will be independent amb with no assistive device on all surfaces for community distances.  - The patient will increase ROM = to uninvolved knee to perform ambulation, toileting, dressing and recreation/leisure activities  with pain < 0/10.  - The patient will increase strength = to uninvolved knee  to perform ambulation, toileting, dressing and recreation/leisure activities   with pain < 0/10.      Plan   Certification Period: 11/14/2019 to  8/14/2020.    Outpatient Physical Therapy 1 times weekly for 9 months to include the following interventions: patient education, Electrical Stimulation NMES, Gait Training, Manual Therapy, Moist Heat/ Ice, Neuromuscular Re-ed, Patient Education, Therapeutic Activites and Therapeutic Exercise.     Sharan Short, PT

## 2019-11-14 NOTE — PROGRESS NOTES
Please see Treatment section for Initial Evaluation and Plan of Care  Sharan Short, PT  11/14/2019

## 2019-11-18 ENCOUNTER — OFFICE VISIT (OUTPATIENT)
Dept: SPORTS MEDICINE | Facility: CLINIC | Age: 34
End: 2019-11-18
Payer: COMMERCIAL

## 2019-11-18 VITALS
BODY MASS INDEX: 25.76 KG/M2 | SYSTOLIC BLOOD PRESSURE: 124 MMHG | HEART RATE: 85 BPM | HEIGHT: 68 IN | DIASTOLIC BLOOD PRESSURE: 78 MMHG | WEIGHT: 170 LBS

## 2019-11-18 DIAGNOSIS — M25.561 ACUTE PAIN OF RIGHT KNEE: ICD-10-CM

## 2019-11-18 DIAGNOSIS — G89.29 CHRONIC PAIN OF RIGHT KNEE: ICD-10-CM

## 2019-11-18 DIAGNOSIS — M25.661 DECREASED RANGE OF MOTION (ROM) OF RIGHT KNEE: Primary | ICD-10-CM

## 2019-11-18 DIAGNOSIS — M62.81 MUSCLE WEAKNESS OF LOWER EXTREMITY: ICD-10-CM

## 2019-11-18 DIAGNOSIS — M25.561 CHRONIC PAIN OF RIGHT KNEE: ICD-10-CM

## 2019-11-18 PROBLEM — S83.511A RUPTURE OF ANTERIOR CRUCIATE LIGAMENT OF RIGHT KNEE: Status: RESOLVED | Noted: 2019-08-28 | Resolved: 2019-11-18

## 2019-11-18 PROCEDURE — 99024 POSTOP FOLLOW-UP VISIT: CPT | Mod: S$GLB,,, | Performed by: ORTHOPAEDIC SURGERY

## 2019-11-18 PROCEDURE — 99024 PR POST-OP FOLLOW-UP VISIT: ICD-10-PCS | Mod: S$GLB,,, | Performed by: ORTHOPAEDIC SURGERY

## 2019-11-18 PROCEDURE — 99999 PR PBB SHADOW E&M-EST. PATIENT-LVL III: ICD-10-PCS | Mod: PBBFAC,,, | Performed by: ORTHOPAEDIC SURGERY

## 2019-11-18 PROCEDURE — 99999 PR PBB SHADOW E&M-EST. PATIENT-LVL III: CPT | Mod: PBBFAC,,, | Performed by: ORTHOPAEDIC SURGERY

## 2019-11-18 RX ORDER — CELECOXIB 200 MG/1
200 CAPSULE ORAL 2 TIMES DAILY
Qty: 60 CAPSULE | Refills: 6 | Status: SHIPPED | OUTPATIENT
Start: 2019-11-18 | End: 2020-01-02

## 2019-11-18 NOTE — PROGRESS NOTES
Subjective:          Chief Complaint: Klarissa Brown is a 34 y.o. female who had concerns including Post-op Evaluation of the Right Knee.    Patient presents to clinic for 6 week post-op evaluation of right knee. Pain today is 0/10. Denies nausea, vomiting, fever, chills, CP, and SOB. Ambulating with T-scope brace unlocked and WBAT and a single crutch as needed. She has been attending formal PT 2x a week at [facility] but recently switched PT locations. She is now working Sharan Shrot, PT at [place]. She is currently taking ASA once a day and Celebrex 200mg BID. She is no longer taking pain medication.     DATE OF PROCEDURE: 10/10/2019  ATTENDING SURGEON: Surgeon(s) and Role:     * Lázaro Cody MD - Primary     Assistants:  Hill Smith MD - Fellow  Katt Smith PA-C     PREOPERATIVE DIAGNOSIS:  Right  Anterior Cruciate Ligament Tear S83.510     POSTOPERATIVE DIAGNOSIS:   Right  Anterior Cruciate Ligament Tear S83.510     PROCEDURES(S) PERFORMED:   1. Right  Arthroscopy, anterior cruciate ligament reconstruction 17698  2.  Right  Arthroscopy, knee, synovectomy, limited 07716             Review of Systems   Constitution: Negative. Negative for chills, fever, weight gain and weight loss.   HENT: Negative.  Negative for congestion and sore throat.    Eyes: Negative.  Negative for blurred vision and double vision.   Cardiovascular: Negative.  Negative for chest pain, leg swelling and palpitations.   Respiratory: Negative.  Negative for cough and shortness of breath.    Endocrine: Negative.    Hematologic/Lymphatic: Negative.  Does not bruise/bleed easily.   Skin: Negative.  Negative for itching, poor wound healing and rash.   Musculoskeletal: Positive for joint pain, joint swelling and stiffness. Negative for back pain, muscle weakness and myalgias.   Gastrointestinal: Negative.  Negative for abdominal pain, constipation, diarrhea, nausea and vomiting.   Genitourinary: Negative.  Negative for frequency and  hematuria.   Neurological: Negative.  Negative for dizziness, headaches, numbness, paresthesias and sensory change.   Psychiatric/Behavioral: Negative.  Negative for altered mental status and depression. The patient is not nervous/anxious.    Allergic/Immunologic: Negative.  Negative for hives.       Pain Related Questions  Over the past 3 days, what was your average pain during activity? (I.e. running, jogging, walking, climbing stairs, getting dressed, ect.): 0  Over the past 3 days, what was your highest pain level?: 0  Over the past 3 days, what was your lowest pain level? : 0    Other  How many nights a week are you awakened by your affected body part?: 0  Was the patient's HEIGHT measured or patient reported?: Patient Reported  Was the patient's WEIGHT measured or patient reported?: Measured      Objective:        General: Klarissa is well-developed, well-nourished, appears stated age, in no acute distress, alert and oriented to time, place and person.     General    Vitals reviewed.  Constitutional: She is oriented to person, place, and time. She appears well-developed and well-nourished. No distress.   HENT:   Mouth/Throat: No oropharyngeal exudate.   Eyes: Right eye exhibits no discharge. Left eye exhibits no discharge.   Neck: Normal range of motion.   Cardiovascular: Normal rate and regular rhythm.    Pulmonary/Chest: Effort normal and breath sounds normal. No respiratory distress.   Neurological: She is alert and oriented to person, place, and time. She has normal reflexes. No cranial nerve deficit. Coordination normal.   Psychiatric: She has a normal mood and affect. Her behavior is normal. Judgment and thought content normal.     General Musculoskeletal Exam   Gait: normal       Right Knee Exam     Inspection   Erythema: absent  Scars: present  Swelling: absent  Effusion: absent  Deformity: absent    Tenderness   The patient is experiencing no tenderness.     Range of Motion   Extension: 0   Flexion: 100      Tests   Meniscus   Jennifer:  Medial - negative Lateral - negative  Ligament Examination Lachman: normal (-1 to 2mm) PCL-Posterior Drawer: normal (0 to 2mm)     MCL - Valgus: normal (0 to 2mm)  LCL - Varus: normalPivot Shift: normal (Equal)Reverse Pivot Shift: normal (Equal)Dial Test at 30 degrees: normal (< 5 degrees)Dial Test at 90 degrees: normal (< 5 degrees)  Posterior Sag Test: negative  Posterolateral Corner: unstable (>15 degrees difference)  Patella   Patellar apprehension: negative  Passive Patellar Tilt: neutral  Patellar Tracking: normal  Patellar Glide (quadrants): Lateral - 1   Medial - 2  Q-Angle at 90 degrees: normal  Patellar Grind: negative  J-Sign: none    Other   Meniscal Cyst: absent  Popliteal (Baker's) Cyst: absent  Sensation: normal    Comments:  Portal sutures removed. No signs of infection or necrosis. No purulent drainage. NVI.     Left Knee Exam     Inspection   Erythema: absent  Scars: absent  Swelling: absent  Effusion: absent  Deformity: absent  Bruising: absent    Tenderness   The patient is experiencing no tenderness.     Range of Motion   Extension: 0   Flexion: 140     Tests   Meniscus   Jennifer:  Medial - negative Lateral - negative  Stability Lachman: normal (-1 to 2mm) PCL-Posterior Drawer: normal (0 to 2mm)  MCL - Valgus: normal (0 to 2mm)  LCL - Varus: normal (0 to 2mm)Pivot Shift: normal (Equal)Reverse Pivot Shift: normal (Equal)Dial Test at 30 degrees: normal (< 5 degrees)Dial Test at 90 degrees: normal (< 5 degrees)  Posterior Sag Test: negative  Posterolateral Corner: unstable (>15 degrees difference)  Patella   Patellar apprehension: negative  Passive Patellar Tilt: neutral  Patellar Tracking: normal  Patellar Glide (Quadrants): Lateral - 1 Medial - 2  Q-Angle at 90 degrees: normal  Patellar Grind: negative  J-Sign: J sign absent    Other   Meniscal Cyst: absent  Popliteal (Baker's) Cyst: absent  Sensation: normal    Right Hip Exam     Tests   Eloisa: negative  Left  Hip Exam     Tests   Eloisa: negative          Muscle Strength   Right Lower Extremity   Hip Abduction: 5/5   Quadriceps:  4/5   Hamstrin/5   Left Lower Extremity   Hip Abduction: 5/5   Quadriceps:  5/5   Hamstrin/5     Reflexes     Left Side  Quadriceps:  2+  Achilles:  2+    Right Side   Quadriceps:  2+  Achilles:  2+    Vascular Exam     Right Pulses  Dorsalis Pedis:      2+  Posterior Tibial:      2+        Left Pulses  Dorsalis Pedis:      2+  Posterior Tibial:      2+          RADIOGRAPHS:  Right knee:  FINDINGS:  Two views: There is ACL repair.  No acute fracture dislocation bone destruction or complication seen.        Assessment:       Encounter Diagnoses   Name Primary?    Decreased range of motion (ROM) of right knee Yes    Muscle weakness of lower extremity     Acute pain of right knee     Chronic pain of right knee           Plan:       1. IKDC, SF-12 and KOOS was filled out today in clinic.     RTC in 6 weeks with Dr. Lázaro Cody Patient will fill out IKDC, SF-12 and KOOS on return.     2. PT orders placed today- Sharan Short, PT    3. Celebrex 200mg BID- sent to pharmacy    4. Wean out of T-Scope Brace     5. Discontinue immobilizer; wean off the crutch  May swim  Start CORE program  Exercycle 30-45 min.                      Patient questionnaires may have been collected.

## 2019-11-20 ENCOUNTER — CLINICAL SUPPORT (OUTPATIENT)
Dept: REHABILITATION | Facility: HOSPITAL | Age: 34
End: 2019-11-20
Attending: PHYSICIAN ASSISTANT
Payer: COMMERCIAL

## 2019-11-20 DIAGNOSIS — M25.661 DECREASED RANGE OF MOTION (ROM) OF RIGHT KNEE: ICD-10-CM

## 2019-11-20 DIAGNOSIS — M62.81 MUSCLE WEAKNESS OF LOWER EXTREMITY: ICD-10-CM

## 2019-11-20 DIAGNOSIS — M25.561 CHRONIC PAIN OF RIGHT KNEE: Primary | ICD-10-CM

## 2019-11-20 DIAGNOSIS — G89.29 CHRONIC PAIN OF RIGHT KNEE: Primary | ICD-10-CM

## 2019-11-20 PROCEDURE — 97110 THERAPEUTIC EXERCISES: CPT | Performed by: PHYSICAL THERAPIST

## 2019-11-21 NOTE — PROGRESS NOTES
Physical Therapy Daily Treatment Note     Visit Date: 11/20/2019    Name: Klarissa Brown  Clinic Number: [id number]    Therapy Diagnosis:   Encounter Diagnoses   Name Primary?    Decreased range of motion (ROM) of right knee     Muscle weakness of lower extremity     Chronic pain of right knee Yes     Physician: Katt Smith PA-C      PROCEDURES(S) PERFORMED:   1. Right  Arthroscopy, anterior cruciate ligament reconstruction 56367  2.  Right  Arthroscopy, knee, synovectomy, limited 81538    GRAFT SOURCE:  Hamstring auto graft       DATE OF PROCEDURE: 10/10/2019         Physician Orders: PT Eval and Treat   Medical Diagnosis: Z98.890 (ICD-10-CM) - S/P ACL reconstruction  Evaluation Date: 11/14/2019  Authorization Period Expiration: 12/31/2019  Plan of Care Certification Period: 8/14/2020  Visit # / Visits authorized: 1/ 20    Time In: 15:0  Time Out: 16:00  Total Billable Time: 60 minutes    Precautions: Standard + PHYSICAL THERAPY:  The patient should begin physical therapy on postoperative   day #3 and will be advanced to outpatient therapy as soon as   Possible following discharge.     Weight bearing:as tolerated  right leg  Range of Motion:Full normal motion symmetric to opposite side     No CPM required due to procedure performed   to begin quad sets with a heel roll to obtain hyperextension, straight leg raise and heel slides with the heel supported in a closed-chain fashion     Immediate specific exercises should include:   Gait program should include the above stated weightbearing; in addition: active extension with a heel-to-toe gait working on maintaining extension     Immobilizer if present should be locked @-10 degrees with gait and allowed to flex to 120 degrees at rest.          to begin quad sets with a heel roll to obtain hyperextension, straight leg raise and heel slides with the heel supported in a closed-chain fashion     Immediate specific exercises should include:   Gait program  should include the above stated weightbearing; in  Medication(s): Preoperative Epic medication regimen        Subjective      Pt reports improvement in her rR knee condition after IE/HEP    she was compliant with home exercise program given last session.   Response to previous treatment:good   Functional change: enters PT FWB with no brace and no assistive device     Pain: 0/10  Location: right knee      Objective     Measurements taken:      Klarissa received therapeutic exercises to develop strength, endurance, ROM and flexibility for 45 minutes including:    PF joint mobilization grade III  Prone self ROM k' flex + RF and prone 5x;15  Bike x 10' 3.5  HSS 5x:15  Supine RF stretch 5x;15  Self PROM heel slides 1x15  TKE 1/2 roll 1x10:10 0#  QS 1x10:10  2D  Supine TKE to SLR 1x10:05 0#  St TKE 3x10:01 purple   Prone PROM k' flex + RF 3xs  Prone ext hang 3'x2 0#     CP x 10'     Home Exercises Provided and Patient Education Provided     Education provided:   -  None this visit     Written Home Exercises Provided: Patient instructed to cont prior HEP.  Exercises were reviewed and Klarissa was able to demonstrate them prior to the end of the session.  Klarissa demonstrated good  understanding of the education provided.     See EMR under hand out  for exercises provided prior visit       Assessment     michael Rx without increase in sxs, within session improvement in ROM and quad activation     Klarissa is progressing well towards her goals.   Prognosis is good .     Pt will continue to benefit from skilled outpatient physical therapy to address the deficits listed in the problem list box on initial evaluation, provide pt/family education and to maximize pt's level of independence in the home and community environment.     Pt's spiritual, cultural and educational needs considered and pt agreeable to plan of care and goals.    Anticipated barriers to physical therapy: none    Goals:     Short-Term Goals: 6  weeks  - The patient will be  independent with initial home exercise program.  - The patient is independent with donning/doffing brace to protect tissue healing.  - The patient will be independent amb with crutches on level tile for household distances.  - The patient will increase ROM by 15 degrees to perform ambulation and bathing and hygiene with pain < 0/10.  - The patient will increase strength by 1/2 muscle grade to perform ambulation and bathing and hygiene with pain < 0/10.    Long-Term Goals: 36 weeks  - The patient will be independent with home exercise program and symptom management.  - The patient will be independent amb with no assistive device on all surfaces for community distances.  - The patient will increase ROM = to uninvolved knee to perform ambulation, toileting, dressing and recreation/leisure activities  with pain < 0/10.  - The patient will increase strength = to uninvolved knee  to perform ambulation, toileting, dressing and recreation/leisure activities   with pain < 0/10.      Plan     Continue with established Plan of Care towards PT goals with focus on decreasing pain, increasing ROM, strength, neuromuscular control and functional status       Sharan Short, PT

## 2019-11-26 NOTE — PROGRESS NOTES
Physical Therapy Daily Treatment Note     Visit Date: 11/27/2019    Name: Klarissa Brown  Clinic Number: [id number]    Therapy Diagnosis:   Encounter Diagnoses   Name Primary?    Decreased range of motion (ROM) of right knee     Muscle weakness of lower extremity     Chronic pain of right knee Yes     Physician: Katt Smith PA-C      PROCEDURES(S) PERFORMED:   1. Right  Arthroscopy, anterior cruciate ligament reconstruction 56857  2.  Right  Arthroscopy, knee, synovectomy, limited 88470    GRAFT SOURCE:  Hamstring auto graft       DATE OF PROCEDURE: 10/10/2019         Physician Orders: PT Eval and Treat   Medical Diagnosis: Z98.890 (ICD-10-CM) - S/P ACL reconstruction  Evaluation Date: 11/14/2019  Authorization Period Expiration: 12/31/2019  Plan of Care Certification Period: 8/14/2020  Visit # / Visits authorized: 3/ 20    Time In:  8:30  Time Out: 9:30  Total Billable Time: 45 minutes    Precautions: Standard + PHYSICAL THERAPY:  The patient should begin physical therapy on postoperative   day #3 and will be advanced to outpatient therapy as soon as   Possible following discharge.     Weight bearing:as tolerated  right leg  Range of Motion:Full normal motion symmetric to opposite side     No CPM required due to procedure performed   to begin quad sets with a heel roll to obtain hyperextension, straight leg raise and heel slides with the heel supported in a closed-chain fashion     Immediate specific exercises should include:   Gait program should include the above stated weightbearing; in addition: active extension with a heel-to-toe gait working on maintaining extension     Immobilizer if present should be locked @-10 degrees with gait and allowed to flex to 120 degrees at rest.          to begin quad sets with a heel roll to obtain hyperextension, straight leg raise and heel slides with the heel supported in a closed-chain fashion     Immediate specific exercises should include:   Gait program  "should include the above stated weightbearing; in  Medication(s): Preoperative Epic medication regimen        Subjective      Pt reports "I'm still having a hard time walking"     she was compliant with home exercise program given last session.   Response to previous treatment:good   Functional change: enters PT FWB with no brace and no assistive device     Pain: 0/10  Location: right knee      Objective     PO 47    Measurements taken:  Gait deviation saranya by giving way during mid stance     Klarissa received therapeutic exercises to develop strength, endurance, ROM and flexibility for 45 minutes including:    PF joint mobilization grade III  Supine RF stretch 5x;15  Self PROM heel slides 1x15  Prone self ROM k' flex + RF and prone 5x;15  Bike x 10' 3.5  Retro TM 5% x 5' 1.0   LLR 1x20:05 0#  QS 1x10:10  3D  TKE 1x10:105 1/2 roll   St TKE 3x10:01 pink cook band   Seated quad activation against table leg 1x30:06  AROM seated k' ext 2x15:05 0# 90-0   Gait training     CP x 10'     Home Exercises Provided and Patient Education Provided     Education provided:   -  None this visit     Written Home Exercises Provided: Patient instructed to cont prior HEP.  Exercises were reviewed and Klarissa was able to demonstrate them prior to the end of the session.  Klarissa demonstrated good  understanding of the education provided.     See EMR under hand out  for exercises provided prior visit       Assessment     michael Rx without increase in sxs,  Improved ambulation post Rx     Klarissa is progressing well towards her goals.   Prognosis is good .     Pt will continue to benefit from skilled outpatient physical therapy to address the deficits listed in the problem list box on initial evaluation, provide pt/family education and to maximize pt's level of independence in the home and community environment.     Pt's spiritual, cultural and educational needs considered and pt agreeable to plan of care and goals.    Anticipated barriers to physical " therapy: none    Goals:     Short-Term Goals: 6  weeks  - The patient will be independent with initial home exercise program.  - The patient is independent with donning/doffing brace to protect tissue healing.  - The patient will be independent amb with crutches on level tile for household distances.  - The patient will increase ROM by 15 degrees to perform ambulation and bathing and hygiene with pain < 0/10.  - The patient will increase strength by 1/2 muscle grade to perform ambulation and bathing and hygiene with pain < 0/10.    Long-Term Goals: 36 weeks  - The patient will be independent with home exercise program and symptom management.  - The patient will be independent amb with no assistive device on all surfaces for community distances.  - The patient will increase ROM = to uninvolved knee to perform ambulation, toileting, dressing and recreation/leisure activities  with pain < 0/10.  - The patient will increase strength = to uninvolved knee  to perform ambulation, toileting, dressing and recreation/leisure activities   with pain < 0/10.      Plan     Continue with established Plan of Care towards PT goals with focus on decreasing pain, increasing ROM, strength, neuromuscular control and functional status       Sharan Short, PT

## 2019-11-27 ENCOUNTER — CLINICAL SUPPORT (OUTPATIENT)
Dept: REHABILITATION | Facility: HOSPITAL | Age: 34
End: 2019-11-27
Attending: PHYSICIAN ASSISTANT
Payer: COMMERCIAL

## 2019-11-27 DIAGNOSIS — M25.561 CHRONIC PAIN OF RIGHT KNEE: Primary | ICD-10-CM

## 2019-11-27 DIAGNOSIS — M62.81 MUSCLE WEAKNESS OF LOWER EXTREMITY: ICD-10-CM

## 2019-11-27 DIAGNOSIS — G89.29 CHRONIC PAIN OF RIGHT KNEE: Primary | ICD-10-CM

## 2019-11-27 DIAGNOSIS — M25.661 DECREASED RANGE OF MOTION (ROM) OF RIGHT KNEE: ICD-10-CM

## 2019-11-27 PROCEDURE — 97110 THERAPEUTIC EXERCISES: CPT | Performed by: PHYSICAL THERAPIST

## 2019-12-04 ENCOUNTER — CLINICAL SUPPORT (OUTPATIENT)
Dept: REHABILITATION | Facility: HOSPITAL | Age: 34
End: 2019-12-04
Attending: PHYSICIAN ASSISTANT
Payer: COMMERCIAL

## 2019-12-04 DIAGNOSIS — M25.561 CHRONIC PAIN OF RIGHT KNEE: Primary | ICD-10-CM

## 2019-12-04 DIAGNOSIS — M25.661 DECREASED RANGE OF MOTION (ROM) OF RIGHT KNEE: ICD-10-CM

## 2019-12-04 DIAGNOSIS — G89.29 CHRONIC PAIN OF RIGHT KNEE: Primary | ICD-10-CM

## 2019-12-04 DIAGNOSIS — M62.81 MUSCLE WEAKNESS OF LOWER EXTREMITY: ICD-10-CM

## 2019-12-04 PROCEDURE — 97110 THERAPEUTIC EXERCISES: CPT | Performed by: PHYSICAL THERAPIST

## 2019-12-04 NOTE — PROGRESS NOTES
Physical Therapy Daily Treatment Note     Visit Date: 12/4/2019    Name: Klarissa Brown  Clinic Number: [id number]    Therapy Diagnosis:   Encounter Diagnoses   Name Primary?    Decreased range of motion (ROM) of right knee     Muscle weakness of lower extremity     Chronic pain of right knee Yes     Physician: Katt Smith PA-C      PROCEDURES(S) PERFORMED:   1. Right  Arthroscopy, anterior cruciate ligament reconstruction 45401  2.  Right  Arthroscopy, knee, synovectomy, limited 41350    GRAFT SOURCE:  Hamstring auto graft       DATE OF PROCEDURE: 10/10/2019         Physician Orders: PT Eval and Treat   Medical Diagnosis: Z98.890 (ICD-10-CM) - S/P ACL reconstruction  Evaluation Date: 11/14/2019  Authorization Period Expiration: 12/31/2019  Plan of Care Certification Period: 8/14/2020  Visit # / Visits authorized: 4/ 20    Time In:  8:30  Time Out: 9:30  Total Billable Time: 45 minutes    Precautions: Standard + PHYSICAL THERAPY:  The patient should begin physical therapy on postoperative   day #3 and will be advanced to outpatient therapy as soon as   Possible following discharge.     Weight bearing:as tolerated  right leg  Range of Motion:Full normal motion symmetric to opposite side     No CPM required due to procedure performed   to begin quad sets with a heel roll to obtain hyperextension, straight leg raise and heel slides with the heel supported in a closed-chain fashion     Immediate specific exercises should include:   Gait program should include the above stated weightbearing; in addition: active extension with a heel-to-toe gait working on maintaining extension     Immobilizer if present should be locked @-10 degrees with gait and allowed to flex to 120 degrees at rest.          to begin quad sets with a heel roll to obtain hyperextension, straight leg raise and heel slides with the heel supported in a closed-chain fashion     Immediate specific exercises should include:   Gait program  "should include the above stated weightbearing; in  Medication(s): Preoperative Epic medication regimen        Subjective      Pt reports cc "stiffness" during ambulation     she was compliant with home exercise program given last session.   Response to previous treatment:good   Functional change: enters PT FWB with no brace and no assistive device     Pain: 0/10  Location: right knee      Objective     PO 54    Measurements taken:  Gait deviation saranya by knee flexion at heel strike and decreased k' flex during swing      Klarissa received therapeutic exercises to develop strength, endurance, ROM and flexibility for 45 minutes including:    PF joint mobilization grade III  Self PROM heel slides 1x15  Prone self ROM k' flex + RF and prone 5x;15  Bike x 10' 3.5  Retro TM 7.5% x 5' 1.0   TKE 1x10:10 full and 1/2 roll  Sit up TKE to SLR 1x10:05 0# 1/2 roll    St TKE 2x10:05 pink cook band   Supine RF stretch 5x:15  PROM prone + RF 3xs  Prone ext hang 3'x1 3#    CP x 10'     Home Exercises Provided and Patient Education Provided     Education provided:   -  None this visit     Written Home Exercises Provided: Patient instructed to cont prior HEP.  Exercises were reviewed and Klarissa was able to demonstrate them prior to the end of the session.  Klarissa demonstrated good  understanding of the education provided.     See EMR under hand out  for exercises provided prior visit       Assessment     michael Rx without increase in sxs, again, signficant Improved ambulation post Rx     Klarissa is progressing well towards her goals.   Prognosis is good .     Pt will continue to benefit from skilled outpatient physical therapy to address the deficits listed in the problem list box on initial evaluation, provide pt/family education and to maximize pt's level of independence in the home and community environment.     Pt's spiritual, cultural and educational needs considered and pt agreeable to plan of care and goals.    Anticipated barriers to " physical therapy: none    Goals:     Short-Term Goals: 6  weeks  - The patient will be independent with initial home exercise program.  - The patient is independent with donning/doffing brace to protect tissue healing.  - The patient will be independent amb with crutches on level tile for household distances.  - The patient will increase ROM by 15 degrees to perform ambulation and bathing and hygiene with pain < 0/10.  - The patient will increase strength by 1/2 muscle grade to perform ambulation and bathing and hygiene with pain < 0/10.    Long-Term Goals: 36 weeks  - The patient will be independent with home exercise program and symptom management.  - The patient will be independent amb with no assistive device on all surfaces for community distances.  - The patient will increase ROM = to uninvolved knee to perform ambulation, toileting, dressing and recreation/leisure activities  with pain < 0/10.  - The patient will increase strength = to uninvolved knee  to perform ambulation, toileting, dressing and recreation/leisure activities   with pain < 0/10.      Plan     Continue with established Plan of Care towards PT goals with focus on decreasing pain, increasing ROM, strength, neuromuscular control and functional status       Sharan Short, PT

## 2019-12-08 ENCOUNTER — PATIENT MESSAGE (OUTPATIENT)
Dept: OBSTETRICS AND GYNECOLOGY | Facility: CLINIC | Age: 34
End: 2019-12-08

## 2019-12-18 ENCOUNTER — CLINICAL SUPPORT (OUTPATIENT)
Dept: REHABILITATION | Facility: HOSPITAL | Age: 34
End: 2019-12-18
Attending: PHYSICIAN ASSISTANT
Payer: COMMERCIAL

## 2019-12-18 DIAGNOSIS — M62.81 MUSCLE WEAKNESS OF LOWER EXTREMITY: ICD-10-CM

## 2019-12-18 DIAGNOSIS — G89.29 CHRONIC PAIN OF RIGHT KNEE: Primary | ICD-10-CM

## 2019-12-18 DIAGNOSIS — M25.561 CHRONIC PAIN OF RIGHT KNEE: Primary | ICD-10-CM

## 2019-12-18 DIAGNOSIS — M25.661 DECREASED RANGE OF MOTION (ROM) OF RIGHT KNEE: ICD-10-CM

## 2019-12-18 PROCEDURE — 97110 THERAPEUTIC EXERCISES: CPT | Performed by: PHYSICAL THERAPIST

## 2019-12-18 NOTE — PROGRESS NOTES
Physical Therapy Daily Treatment Note     Visit Date: 12/18/2019    Name: Klarissa Brown  Clinic Number: [id number]    Therapy Diagnosis:   Encounter Diagnoses   Name Primary?    Decreased range of motion (ROM) of right knee     Muscle weakness of lower extremity     Chronic pain of right knee Yes     Physician: Katt Smith, RAFAELA Cody     PROCEDURES(S) PERFORMED:   1. Right  Arthroscopy, anterior cruciate ligament reconstruction 90902  2.  Right  Arthroscopy, knee, synovectomy, limited 62861    GRAFT SOURCE:  Hamstring auto graft     DATE OF PROCEDURE: 10/10/2019     Physician Orders: PT Eval and Treat   Medical Diagnosis: Z98.890 (ICD-10-CM) - S/P ACL reconstruction  Evaluation Date: 11/14/2019  Authorization Period Expiration: 12/31/2019  Plan of Care Certification Period: 8/14/2020  Visit # / Visits authorized: 5/ 20    Time In:  8:30  Time Out: 9:30  Total Billable Time: 45 minutes    Precautions: Standard + PHYSICAL THERAPY:  The patient should begin physical therapy on postoperative   day #3 and will be advanced to outpatient therapy as soon as   Possible following discharge.     Weight bearing:as tolerated  right leg  Range of Motion:Full normal motion symmetric to opposite side     No CPM required due to procedure performed   to begin quad sets with a heel roll to obtain hyperextension, straight leg raise and heel slides with the heel supported in a closed-chain fashion     Immediate specific exercises should include:   Gait program should include the above stated weightbearing; in addition: active extension with a heel-to-toe gait working on maintaining extension     Immobilizer if present should be locked @-10 degrees with gait and allowed to flex to 120 degrees at rest.          to begin quad sets with a heel roll to obtain hyperextension, straight leg raise and heel slides with the heel supported in a closed-chain fashion     Immediate specific exercises should include:   Gait  program should include the above stated weightbearing; in  Medication(s): Preoperative Epic medication regimen        Subjective      Pt reports walking x 2 miles and then being on her feet for 4 hours the following day with resulting pain/soreness in patellar tendon region     she was compliant with home exercise program given last session.   Response to previous treatment:good   Functional change: see subjective above     Pain: 0/10  Location: right knee      Objective     PO 68    Measurements taken:  (+) TTP patellar tendon     Klarissa received therapeutic exercises to develop strength, endurance, ROM and flexibility for 45 minutes including:    Bike x 5' 5.0  ET x 5'   HSS 5x;15  QS 1x10:10  Sit up SLR 1x10:05 0#  Supine TKE to SLR 1x10:05 0#  B bridge 2x10:10  Clamshell 2x15:05 green R/L  prone ham curls 2x15:01 0#   Prone self ROM k' flex  prone + RF and prone 5x:15 each   Prone ext hang 1'x1 0#    CP x 10'     Home Exercises Provided and Patient Education Provided     Education provided:   -  None this visit     Written Home Exercises Provided: Patient instructed to cont prior HEP.  Exercises were reviewed and Klarissa was able to demonstrate them prior to the end of the session.  lKarissa demonstrated good  understanding of the education provided.     See EMR under hand out  for exercises provided for this visit  12/18/2019      Assessment     michael Rx without increase in sxs,  Pt demonstrates continued LOM and decreased quad/ham strength affecting ADLs     Klarissa is progressing well towards her goals.   Prognosis is good .     Pt will continue to benefit from skilled outpatient physical therapy to address the deficits listed in the problem list box on initial evaluation, provide pt/family education and to maximize pt's level of independence in the home and community environment.     Pt's spiritual, cultural and educational needs considered and pt agreeable to plan of care and goals.    Anticipated barriers to  physical therapy: none    Goals:     Short-Term Goals: 6  weeks  - The patient will be independent with initial home exercise program.  - The patient is independent with donning/doffing brace to protect tissue healing.  - The patient will be independent amb with crutches on level tile for household distances.  - The patient will increase ROM by 15 degrees to perform ambulation and bathing and hygiene with pain < 0/10.  - The patient will increase strength by 1/2 muscle grade to perform ambulation and bathing and hygiene with pain < 0/10.    Long-Term Goals: 36 weeks  - The patient will be independent with home exercise program and symptom management.  - The patient will be independent amb with no assistive device on all surfaces for community distances.  - The patient will increase ROM = to uninvolved knee to perform ambulation, toileting, dressing and recreation/leisure activities  with pain < 0/10.  - The patient will increase strength = to uninvolved knee  to perform ambulation, toileting, dressing and recreation/leisure activities   with pain < 0/10.      Plan     Continue with established Plan of Care towards PT goals with focus on decreasing pain, increasing ROM, strength, neuromuscular control and functional status       Sharan Shotr, PT

## 2019-12-28 ENCOUNTER — PATIENT OUTREACH (OUTPATIENT)
Dept: ADMINISTRATIVE | Facility: OTHER | Age: 34
End: 2019-12-28

## 2019-12-30 ENCOUNTER — CLINICAL SUPPORT (OUTPATIENT)
Dept: REHABILITATION | Facility: HOSPITAL | Age: 34
End: 2019-12-30
Attending: PHYSICIAN ASSISTANT
Payer: COMMERCIAL

## 2019-12-30 DIAGNOSIS — M25.561 CHRONIC PAIN OF RIGHT KNEE: Primary | ICD-10-CM

## 2019-12-30 DIAGNOSIS — M25.661 DECREASED RANGE OF MOTION (ROM) OF RIGHT KNEE: ICD-10-CM

## 2019-12-30 DIAGNOSIS — M62.81 MUSCLE WEAKNESS OF LOWER EXTREMITY: ICD-10-CM

## 2019-12-30 DIAGNOSIS — G89.29 CHRONIC PAIN OF RIGHT KNEE: Primary | ICD-10-CM

## 2019-12-30 PROCEDURE — 97110 THERAPEUTIC EXERCISES: CPT | Performed by: PHYSICAL THERAPIST

## 2019-12-30 NOTE — PROGRESS NOTES
Physical Therapy Daily Treatment Note     Visit Date: 12/30/2019    Name: Klarissa Brown  Clinic Number: [id number]    Therapy Diagnosis:   Encounter Diagnoses   Name Primary?    Decreased range of motion (ROM) of right knee     Muscle weakness of lower extremity     Chronic pain of right knee Yes     Physician: Katt Smith, RAFAELA Cody     PROCEDURES(S) PERFORMED:   1. Right  Arthroscopy, anterior cruciate ligament reconstruction 05988  2.  Right  Arthroscopy, knee, synovectomy, limited 53072    GRAFT SOURCE:  Hamstring auto graft     DATE OF PROCEDURE: 10/10/2019     Physician Orders: PT Eval and Treat   Medical Diagnosis: Z98.890 (ICD-10-CM) - S/P ACL reconstruction  Evaluation Date: 11/14/2019  Authorization Period Expiration: 12/31/2019  Plan of Care Certification Period: 8/14/2020  Visit # / Visits authorized: 6/ 20    Time In:  17:30  Time Out: 18:30  Total Billable Time: 45 minutes    Precautions: Standard + PHYSICAL THERAPY:  The patient should begin physical therapy on postoperative   day #3 and will be advanced to outpatient therapy as soon as   Possible following discharge.     Weight bearing:as tolerated  right leg  Range of Motion:Full normal motion symmetric to opposite side     No CPM required due to procedure performed   to begin quad sets with a heel roll to obtain hyperextension, straight leg raise and heel slides with the heel supported in a closed-chain fashion     Immediate specific exercises should include:   Gait program should include the above stated weightbearing; in addition: active extension with a heel-to-toe gait working on maintaining extension     Immobilizer if present should be locked @-10 degrees with gait and allowed to flex to 120 degrees at rest.          to begin quad sets with a heel roll to obtain hyperextension, straight leg raise and heel slides with the heel supported in a closed-chain fashion     Immediate specific exercises should include:   Gait  "program should include the above stated weightbearing; in  Medication(s): Preoperative Epic medication regimen        Subjective      Pt reports doing bike and ET on her own with good results but notes continued weakness in R LE stating "I still can't go up or down stairs"     she was compliant with home exercise program given last session.   Response to previous treatment:good   Functional change: see subjective above     Pain: 0/10  Location: right knee      Objective     PO 80    Measurements taken:    PROM:  2/0/130 (prone) vs 2/0/144 on L     Klarissa received therapeutic exercises to develop strength, endurance, ROM and flexibility for 45 minutes including:    Bike x 5' 5.0  ET x 5'   HSS 5x;15  Sit up TKE to SLR 1x10:05 0#  Sit up   Supine SLR 1x20:05 2.5#  U bridge 1x20:05 with opp LE resting on table R/L  Wall sit 5x:30   LP U 3x15 60#  HR U on LP 3xburn 60#  Self ROM heel slides 1x10  Prone self ROM k' flex  prone + RF and prone 5x:15 each   Prone ext hang 1'x1 0#    CP x 10'     Home Exercises Provided and Patient Education Provided     Education provided:   -  None this visit     Written Home Exercises Provided: Patient instructed to cont prior HEP.  Exercises were reviewed and Klarissa was able to demonstrate them prior to the end of the session.  Klarissa demonstrated good  understanding of the education provided.     See EMR under hand out  for exercises provided for this visit  12/30/2019      Assessment     michael Rx without increase in sxs,  Quad weakness evident affecting ADL performance     Klarissa is progressing well towards her goals.   Prognosis is good .     Pt will continue to benefit from skilled outpatient physical therapy to address the deficits listed in the problem list box on initial evaluation, provide pt/family education and to maximize pt's level of independence in the home and community environment.     Pt's spiritual, cultural and educational needs considered and pt agreeable to plan of care " and goals.    Anticipated barriers to physical therapy: none    Goals:     Short-Term Goals: 6  weeks  - The patient will be independent with initial home exercise program.  - The patient is independent with donning/doffing brace to protect tissue healing.  - The patient will be independent amb with crutches on level tile for household distances.  - The patient will increase ROM by 15 degrees to perform ambulation and bathing and hygiene with pain < 0/10.  - The patient will increase strength by 1/2 muscle grade to perform ambulation and bathing and hygiene with pain < 0/10.    Long-Term Goals: 36 weeks  - The patient will be independent with home exercise program and symptom management.  - The patient will be independent amb with no assistive device on all surfaces for community distances.  - The patient will increase ROM = to uninvolved knee to perform ambulation, toileting, dressing and recreation/leisure activities  with pain < 0/10.  - The patient will increase strength = to uninvolved knee  to perform ambulation, toileting, dressing and recreation/leisure activities   with pain < 0/10.      Plan     Continue with established Plan of Care towards PT goals with focus on decreasing pain, increasing ROM, strength, neuromuscular control and functional status       Sharan Short, PT

## 2020-01-02 ENCOUNTER — OFFICE VISIT (OUTPATIENT)
Dept: OBSTETRICS AND GYNECOLOGY | Facility: CLINIC | Age: 35
End: 2020-01-02
Payer: COMMERCIAL

## 2020-01-02 VITALS
WEIGHT: 180.13 LBS | SYSTOLIC BLOOD PRESSURE: 124 MMHG | HEIGHT: 68 IN | DIASTOLIC BLOOD PRESSURE: 70 MMHG | BODY MASS INDEX: 27.3 KG/M2

## 2020-01-02 DIAGNOSIS — N91.2 AMENORRHEA: Primary | ICD-10-CM

## 2020-01-02 LAB
B-HCG UR QL: POSITIVE
CTP QC/QA: YES

## 2020-01-02 PROCEDURE — 99999 PR PBB SHADOW E&M-EST. PATIENT-LVL III: CPT | Mod: PBBFAC,,, | Performed by: OBSTETRICS & GYNECOLOGY

## 2020-01-02 PROCEDURE — 99999 PR PBB SHADOW E&M-EST. PATIENT-LVL III: ICD-10-PCS | Mod: PBBFAC,,, | Performed by: OBSTETRICS & GYNECOLOGY

## 2020-01-02 PROCEDURE — 81025 URINE PREGNANCY TEST: CPT | Mod: S$GLB,,, | Performed by: OBSTETRICS & GYNECOLOGY

## 2020-01-02 PROCEDURE — 99213 PR OFFICE/OUTPT VISIT, EST, LEVL III, 20-29 MIN: ICD-10-PCS | Mod: 25,S$GLB,, | Performed by: OBSTETRICS & GYNECOLOGY

## 2020-01-02 PROCEDURE — 87591 N.GONORRHOEAE DNA AMP PROB: CPT

## 2020-01-02 PROCEDURE — 99213 OFFICE O/P EST LOW 20 MIN: CPT | Mod: 25,S$GLB,, | Performed by: OBSTETRICS & GYNECOLOGY

## 2020-01-02 PROCEDURE — 3008F PR BODY MASS INDEX (BMI) DOCUMENTED: ICD-10-PCS | Mod: CPTII,S$GLB,, | Performed by: OBSTETRICS & GYNECOLOGY

## 2020-01-02 PROCEDURE — 90471 FLU VACCINE (QUAD) GREATER THAN OR EQUAL TO 3YO PRESERVATIVE FREE IM: ICD-10-PCS | Mod: S$GLB,,, | Performed by: OBSTETRICS & GYNECOLOGY

## 2020-01-02 PROCEDURE — 90471 IMMUNIZATION ADMIN: CPT | Mod: S$GLB,,, | Performed by: OBSTETRICS & GYNECOLOGY

## 2020-01-02 PROCEDURE — 87086 URINE CULTURE/COLONY COUNT: CPT

## 2020-01-02 PROCEDURE — 3008F BODY MASS INDEX DOCD: CPT | Mod: CPTII,S$GLB,, | Performed by: OBSTETRICS & GYNECOLOGY

## 2020-01-02 PROCEDURE — 90686 FLU VACCINE (QUAD) GREATER THAN OR EQUAL TO 3YO PRESERVATIVE FREE IM: ICD-10-PCS | Mod: S$GLB,,, | Performed by: OBSTETRICS & GYNECOLOGY

## 2020-01-02 PROCEDURE — 81025 POCT URINE PREGNANCY: ICD-10-PCS | Mod: S$GLB,,, | Performed by: OBSTETRICS & GYNECOLOGY

## 2020-01-02 PROCEDURE — 90686 IIV4 VACC NO PRSV 0.5 ML IM: CPT | Mod: S$GLB,,, | Performed by: OBSTETRICS & GYNECOLOGY

## 2020-01-02 NOTE — PROGRESS NOTES
Klarissa Brown is a 34 y.o. [id number] who presents to clinic for amenorrhea. Patient had a positive home pregnancy test.   Patient denies contractions, denies vaginal bleeding, denies LOF.   Fetal Movement: not yet present.  She denies nausea/vomiting. She has no complaints.     OB History    Para Term  AB Living   1 0 0 0 0 0   SAB TAB Ectopic Multiple Live Births   0 0 0 0        # Outcome Date GA Lbr Nathaniel/2nd Weight Sex Delivery Anes PTL Lv   1 Current                GYNHx:  LMP: 11/10/19  STDs: history of genital warts and genital herpes  Prior contraception: not using any - was actively trying to get pregnant  Last pap: 2019 > [facility]      Review of Systems   Constitutional: Negative for fever.   Respiratory: Negative for shortness of breath.    Cardiovascular: Negative for chest pain.   Gastrointestinal: Negative for abdominal pain.   Genitourinary: Negative for dysuria.   Neurological: Negative for loss of consciousness.   Psychiatric/Behavioral: Negative for depression.             Physical Exam   Constitutional: She is oriented to person, place, and time. She appears well-developed and well-nourished.   Genitourinary:   Genitourinary Comments: Cervix visually closed on speculum exam. No cervical discharge. No blood in the vaginal vault.  On bimanual exam, no CMT. Negative fundal tenderness. No fullness, masses, or tenderness of left adnexa. No fullness, masses, or tenderness or right adnexa.    HENT:   Head: Atraumatic.   Eyes: EOM are normal.   Neck: Neck supple.   Cardiovascular: Normal rate.   Pulmonary/Chest: Effort normal.   Abdominal: Soft.   Musculoskeletal: Normal range of motion.   Neurological: She is alert and oriented to person, place, and time.   Skin: Skin is warm.   Psychiatric: She has a normal mood and affect.               Klarissa was seen today for amenorrhea and morning sickness.    Diagnoses and all orders for this visit:    Amenorrhea  -     POCT urine pregnancy  -      Type & Screen; Future  -     CBC auto differential; Future  -     Hepatitis B surface antigen; Future  -     RPR; Future  -     HIV 1/2 Ag/Ab (4th Gen); Future  -     Varicella zoster antibody, IgG; Future  -     Urine culture  -     C. trachomatis/N. gonorrhoeae by AMP DNA  -     Rubella antibody, IgG; Future  -     Hemoglobin Electrophoresis,Hgb A2 Aniket.; Future  -     US OB/GYN Procedure ([facility]); Future  -     Hepatitis C antibody; Future    Other orders  -     Influenza - Quadrivalent (PF)          She is not sure about genetic screening at this point. Will talk it over with her  and decide by next visit.   She will accept the flu shot.       NEW PREGNANCY COUNSELING  Patient was counseled today on:  - Routine prenatal blood tests including HIV and anticipated course of prenatal care  - Prenatal vitamins and folic acid  - Weight gain, nutrition, and exercise  - Seafood and mercury  - Properly heating deli and prepared meats and avoiding unrefrigerated deli  meats, cheeses, and milk products,   - Avoiding cat litter and raw meats due to risk of Toxoplasmosis precautions   - Accuracy of the LMP-based FRANCOISE and the value of an early TV-u/s  - Aneuploidy and neural tube screening -- cffDNA, sequential screening, and AFP screen at 15 weeks  - OTC medication in the first trimester  - Harmful effects of smoking, etOH, and recreational drugs  - MFM u/s  at 18-20 weeks.  - Common complaints of pregnancy  - Seat belt use  - Childbirth classes and hospital facilities  - All questions were answered      RTC in 4 weeks. Patient considering working with midwives.       Debra Roman M.D.  OBGYN PGY1

## 2020-01-03 LAB
BACTERIA UR CULT: NORMAL
BACTERIA UR CULT: NORMAL
C TRACH DNA SPEC QL NAA+PROBE: NOT DETECTED
N GONORRHOEA DNA SPEC QL NAA+PROBE: NOT DETECTED

## 2020-01-06 ENCOUNTER — PATIENT MESSAGE (OUTPATIENT)
Dept: OBSTETRICS AND GYNECOLOGY | Facility: CLINIC | Age: 35
End: 2020-01-06

## 2020-01-08 ENCOUNTER — CLINICAL SUPPORT (OUTPATIENT)
Dept: REHABILITATION | Facility: HOSPITAL | Age: 35
End: 2020-01-08
Attending: PHYSICIAN ASSISTANT
Payer: COMMERCIAL

## 2020-01-08 DIAGNOSIS — G89.29 CHRONIC PAIN OF RIGHT KNEE: Primary | ICD-10-CM

## 2020-01-08 DIAGNOSIS — M25.661 DECREASED RANGE OF MOTION (ROM) OF RIGHT KNEE: ICD-10-CM

## 2020-01-08 DIAGNOSIS — M62.81 MUSCLE WEAKNESS OF LOWER EXTREMITY: ICD-10-CM

## 2020-01-08 DIAGNOSIS — M25.561 CHRONIC PAIN OF RIGHT KNEE: Primary | ICD-10-CM

## 2020-01-08 PROCEDURE — 97110 THERAPEUTIC EXERCISES: CPT | Performed by: PHYSICAL THERAPIST

## 2020-01-08 NOTE — PROGRESS NOTES
Physical Therapy Daily Treatment Note     Visit Date: 1/8/2020    Name: Klarissa Brown  Clinic Number: [id number]    Therapy Diagnosis:   Encounter Diagnoses   Name Primary?    Decreased range of motion (ROM) of right knee     Muscle weakness of lower extremity     Chronic pain of right knee Yes     Physician: Katt Smith, RAFAELA Cody     PROCEDURES(S) PERFORMED:   1. Right  Arthroscopy, anterior cruciate ligament reconstruction 34245  2.  Right  Arthroscopy, knee, synovectomy, limited 82022    GRAFT SOURCE:  Hamstring auto graft     DATE OF PROCEDURE: 10/10/2019     Physician Orders: PT Eval and Treat   Medical Diagnosis: Z98.890 (ICD-10-CM) - S/P ACL reconstruction  Evaluation Date: 11/14/2019  Authorization Period Expiration: 12/31/2019  Plan of Care Certification Period: 8/14/2020  Visit # / Visits authorized:  1/   In 2020  (7 total)     Time In:  7:30  Time Out: 8:30  Total Billable Time: 30 minutes    Precautions: Standard + PHYSICAL THERAPY:  The patient should begin physical therapy on postoperative   day #3 and will be advanced to outpatient therapy as soon as   Possible following discharge.     Weight bearing:as tolerated  right leg  Range of Motion:Full normal motion symmetric to opposite side     No CPM required due to procedure performed   to begin quad sets with a heel roll to obtain hyperextension, straight leg raise and heel slides with the heel supported in a closed-chain fashion     Immediate specific exercises should include:   Gait program should include the above stated weightbearing; in addition: active extension with a heel-to-toe gait working on maintaining extension     Immobilizer if present should be locked @-10 degrees with gait and allowed to flex to 120 degrees at rest.          to begin quad sets with a heel roll to obtain hyperextension, straight leg raise and heel slides with the heel supported in a closed-chain fashion     Immediate specific exercises should  include:   Gait program should include the above stated weightbearing; in  Medication(s): Preoperative Epic medication regimen        Subjective      Pt reports that she also did a significant amount of walking over New Year's Emilia and this has maintained the soreness in her patellar tendon     she was compliant with home exercise program given last session.   Response to previous treatment:good   Functional change: see subjective above     Pain: 0/10 at rest and with level surface ambulation, however, with squatting, 4/10   Location: right knee  Patellar tendon region     Objective     PO 89    Measurements taken:    (+) TTP over patellar tendon   (PROM:  2/0/130 (prone) vs 2/0/144 on L )    Klarissa received therapeutic exercises to develop strength, endurance, ROM and flexibility for 30 minutes including:    ET x 10'   HSS 5x;15  Supine SLR 1x20:05 3#  B bridge 3x10:05 with back on wt bench   Squat to box 2x10 blue/red and ball squat 1x10  Lunges 1x7 1/2 range 0#  Prone self ROM k' flex  prone + RF and prone 5x:15 each     Ice msg to patellar tendon x 5'     Home Exercises Provided and Patient Education Provided     Education provided:   -  None this visit     Written Home Exercises Provided: Patient instructed to cont prior HEP.  Exercises were reviewed and Klarissa was able to demonstrate them prior to the end of the session.  Klarissa demonstrated good  understanding of the education provided.     See EMR under hand out  for exercises provided for this visit  12/30/2019      Assessment     michael Rx without increase in sxs,  Increased sxs in patellar tendon with lower squats and lunges       Klarissa is progressing well towards her goals.   Prognosis is good .     Pt will continue to benefit from skilled outpatient physical therapy to address the deficits listed in the problem list box on initial evaluation, provide pt/family education and to maximize pt's level of independence in the home and community environment.     Pt's  spiritual, cultural and educational needs considered and pt agreeable to plan of care and goals.    Anticipated barriers to physical therapy: none    Goals:     Short-Term Goals: 6  weeks  - The patient will be independent with initial home exercise program.  - The patient is independent with donning/doffing brace to protect tissue healing.  - The patient will be independent amb with crutches on level tile for household distances.  - The patient will increase ROM by 15 degrees to perform ambulation and bathing and hygiene with pain < 0/10.  - The patient will increase strength by 1/2 muscle grade to perform ambulation and bathing and hygiene with pain < 0/10.    Long-Term Goals: 36 weeks  - The patient will be independent with home exercise program and symptom management.  - The patient will be independent amb with no assistive device on all surfaces for community distances.  - The patient will increase ROM = to uninvolved knee to perform ambulation, toileting, dressing and recreation/leisure activities  with pain < 0/10.  - The patient will increase strength = to uninvolved knee  to perform ambulation, toileting, dressing and recreation/leisure activities   with pain < 0/10.      Plan     Continue with established Plan of Care towards PT goals with focus on decreasing pain, increasing ROM, strength, neuromuscular control and functional status       Sharan Short, PT

## 2020-01-09 ENCOUNTER — PROCEDURE VISIT (OUTPATIENT)
Dept: OBSTETRICS AND GYNECOLOGY | Facility: CLINIC | Age: 35
End: 2020-01-09
Payer: COMMERCIAL

## 2020-01-09 ENCOUNTER — LAB VISIT (OUTPATIENT)
Dept: LAB | Facility: OTHER | Age: 35
End: 2020-01-09
Attending: STUDENT IN AN ORGANIZED HEALTH CARE EDUCATION/TRAINING PROGRAM
Payer: COMMERCIAL

## 2020-01-09 DIAGNOSIS — N91.2 AMENORRHEA: ICD-10-CM

## 2020-01-09 DIAGNOSIS — Z36.89 ESTABLISH GESTATIONAL AGE, ULTRASOUND: ICD-10-CM

## 2020-01-09 LAB
ABO + RH BLD: NORMAL
BASOPHILS # BLD AUTO: 0.05 K/UL (ref 0–0.2)
BASOPHILS NFR BLD: 0.9 % (ref 0–1.9)
BLD GP AB SCN CELLS X3 SERPL QL: NORMAL
DIFFERENTIAL METHOD: ABNORMAL
EOSINOPHIL # BLD AUTO: 0 K/UL (ref 0–0.5)
EOSINOPHIL NFR BLD: 0.2 % (ref 0–8)
ERYTHROCYTE [DISTWIDTH] IN BLOOD BY AUTOMATED COUNT: 12 % (ref 11.5–14.5)
HCT VFR BLD AUTO: 40.5 % (ref 37–48.5)
HGB BLD-MCNC: 12.9 G/DL (ref 12–16)
IMM GRANULOCYTES # BLD AUTO: 0.02 K/UL (ref 0–0.04)
IMM GRANULOCYTES NFR BLD AUTO: 0.4 % (ref 0–0.5)
LYMPHOCYTES # BLD AUTO: 1.3 K/UL (ref 1–4.8)
LYMPHOCYTES NFR BLD: 23.1 % (ref 18–48)
MCH RBC QN AUTO: 28.2 PG (ref 27–31)
MCHC RBC AUTO-ENTMCNC: 31.9 G/DL (ref 32–36)
MCV RBC AUTO: 88 FL (ref 82–98)
MONOCYTES # BLD AUTO: 0.7 K/UL (ref 0.3–1)
MONOCYTES NFR BLD: 12.8 % (ref 4–15)
NEUTROPHILS # BLD AUTO: 3.5 K/UL (ref 1.8–7.7)
NEUTROPHILS NFR BLD: 62.6 % (ref 38–73)
NRBC BLD-RTO: 0 /100 WBC
PLATELET # BLD AUTO: 256 K/UL (ref 150–350)
PMV BLD AUTO: 11.5 FL (ref 9.2–12.9)
RBC # BLD AUTO: 4.58 M/UL (ref 4–5.4)
WBC # BLD AUTO: 5.53 K/UL (ref 3.9–12.7)

## 2020-01-09 PROCEDURE — 83020 HEMOGLOBIN ELECTROPHORESIS: CPT

## 2020-01-09 PROCEDURE — 85025 COMPLETE CBC W/AUTO DIFF WBC: CPT

## 2020-01-09 PROCEDURE — 36415 COLL VENOUS BLD VENIPUNCTURE: CPT

## 2020-01-09 PROCEDURE — 86850 RBC ANTIBODY SCREEN: CPT

## 2020-01-09 PROCEDURE — 76801 OB US < 14 WKS SINGLE FETUS: CPT | Mod: S$GLB,,, | Performed by: OBSTETRICS & GYNECOLOGY

## 2020-01-09 PROCEDURE — 86592 SYPHILIS TEST NON-TREP QUAL: CPT

## 2020-01-09 PROCEDURE — 86787 VARICELLA-ZOSTER ANTIBODY: CPT

## 2020-01-09 PROCEDURE — 86762 RUBELLA ANTIBODY: CPT

## 2020-01-09 PROCEDURE — 86703 HIV-1/HIV-2 1 RESULT ANTBDY: CPT

## 2020-01-09 PROCEDURE — 86803 HEPATITIS C AB TEST: CPT

## 2020-01-09 PROCEDURE — 87340 HEPATITIS B SURFACE AG IA: CPT

## 2020-01-09 PROCEDURE — 76801 PR US, OB <14WKS, TRANSABD, SINGLE GESTATION: ICD-10-PCS | Mod: S$GLB,,, | Performed by: OBSTETRICS & GYNECOLOGY

## 2020-01-09 NOTE — PROGRESS NOTES
Obstetrical ultrasound completed today.  See report in imaging section of [facility].

## 2020-01-10 LAB
HBV SURFACE AG SERPL QL IA: NEGATIVE
HCV AB SERPL QL IA: NEGATIVE
HIV 1+2 AB+HIV1 P24 AG SERPL QL IA: NEGATIVE
RPR SER QL: NORMAL
RUBV IGG SER-ACNC: 33.5 IU/ML
RUBV IGG SER-IMP: REACTIVE

## 2020-01-11 LAB
VARICELLA INTERPRETATION: POSITIVE
VARICELLA ZOSTER IGG: 2.97 ISR (ref 0–0.9)

## 2020-01-13 ENCOUNTER — PATIENT MESSAGE (OUTPATIENT)
Dept: OBSTETRICS AND GYNECOLOGY | Facility: CLINIC | Age: 35
End: 2020-01-13

## 2020-01-13 LAB
HGB A2 MFR BLD HPLC: 3 % (ref 2.2–3.2)
HGB FRACT BLD ELPH-IMP: NORMAL
HGB FRACT BLD ELPH-IMP: NORMAL

## 2020-01-15 ENCOUNTER — CLINICAL SUPPORT (OUTPATIENT)
Dept: REHABILITATION | Facility: HOSPITAL | Age: 35
End: 2020-01-15
Attending: PHYSICIAN ASSISTANT
Payer: COMMERCIAL

## 2020-01-15 DIAGNOSIS — M62.81 MUSCLE WEAKNESS OF LOWER EXTREMITY: ICD-10-CM

## 2020-01-15 DIAGNOSIS — M25.561 CHRONIC PAIN OF RIGHT KNEE: Primary | ICD-10-CM

## 2020-01-15 DIAGNOSIS — G89.29 CHRONIC PAIN OF RIGHT KNEE: Primary | ICD-10-CM

## 2020-01-15 DIAGNOSIS — M25.661 DECREASED RANGE OF MOTION (ROM) OF RIGHT KNEE: ICD-10-CM

## 2020-01-15 PROCEDURE — 97110 THERAPEUTIC EXERCISES: CPT | Performed by: PHYSICAL THERAPIST

## 2020-01-15 NOTE — PROGRESS NOTES
Physical Therapy Daily Treatment Note     Visit Date: 1/15/2020    Name: Klarissa Brown  Clinic Number: [id number]    Therapy Diagnosis:   Encounter Diagnoses   Name Primary?    Decreased range of motion (ROM) of right knee     Muscle weakness of lower extremity     Chronic pain of right knee Yes     Physician: Katt Smith, RAFAELA Cody     PROCEDURES(S) PERFORMED:   1. Right  Arthroscopy, anterior cruciate ligament reconstruction 30270  2.  Right  Arthroscopy, knee, synovectomy, limited 68958    GRAFT SOURCE:  Hamstring auto graft     DATE OF PROCEDURE: 10/10/2019     Physician Orders: PT Eval and Treat   Medical Diagnosis: Z98.890 (ICD-10-CM) - S/P ACL reconstruction  Evaluation Date: 11/14/2019  Authorization Period Expiration: 12/31/2019  Plan of Care Certification Period: 8/14/2020  Visit # / Visits authorized:  2/   In 2020  (8 total)     Time In:  11:30  Time Out: 12:30  Total Billable Time: 45 minutes    Precautions: Standard + PHYSICAL THERAPY:  The patient should begin physical therapy on postoperative   day #3 and will be advanced to outpatient therapy as soon as   Possible following discharge.     Weight bearing:as tolerated  right leg  Range of Motion:Full normal motion symmetric to opposite side     No CPM required due to procedure performed   to begin quad sets with a heel roll to obtain hyperextension, straight leg raise and heel slides with the heel supported in a closed-chain fashion     Immediate specific exercises should include:   Gait program should include the above stated weightbearing; in addition: active extension with a heel-to-toe gait working on maintaining extension     Immobilizer if present should be locked @-10 degrees with gait and allowed to flex to 120 degrees at rest.          to begin quad sets with a heel roll to obtain hyperextension, straight leg raise and heel slides with the heel supported in a closed-chain fashion     Immediate specific exercises should  include:   Gait program should include the above stated weightbearing; in  Medication(s): Preoperative Epic medication regimen        Subjective      Pt reports continued pain/soreness in patellar tendon region, particularly with stairs      she was compliant with home exercise program given last session.   Response to previous treatment:good   Functional change: see subjective above     Pain: 0/10 at rest and with level surface ambulation, however, with squatting, 4/10   Location: right knee  Patellar tendon region     Objective     PO 96    Measurements taken:  continued  (+) TTP over patellar tendon   (PROM:  2/0/130 (prone) vs 2/0/144 on L )    Klarissa received therapeutic exercises to develop strength, endurance, ROM and flexibility for 45 minutes one on one with PT including:    Patellar tendon taping   Supine SLR  1x10:10 0#  Wall sit 5x:30  Shuttle LP 2b sara holds 5x:30   St quad isometric as SLR into table leg 1x15:06  Seated quad isometric 1x15:06  Supine SLR 1x10:10 0#    CP x 10'     Home Exercises Provided and Patient Education Provided     Education provided:   -  None this visit     Written Home Exercises Provided: Patient instructed to cont prior HEP.  Exercises were reviewed and Klarissa was able to demonstrate them prior to the end of the session.  Klarissa demonstrated good  understanding of the education provided.     See EMR under hand out  for exercises provided for this visit  St and seated quad isometrics, SLR       Assessment     michael Rx without increase in sxs,  Continued sxs in patellar tendon with open chain knee ext and closed chain LP irrespective of ROM   Slight improvement in pt's sxs with taping     Klarissa is progressing well towards her goals.   Prognosis is good .     Pt will continue to benefit from skilled outpatient physical therapy to address the deficits listed in the problem list box on initial evaluation, provide pt/family education and to maximize pt's level of independence in the  home and community environment.     Pt's spiritual, cultural and educational needs considered and pt agreeable to plan of care and goals.    Anticipated barriers to physical therapy: none    Goals:     Short-Term Goals: 6  weeks  - The patient will be independent with initial home exercise program.  - The patient is independent with donning/doffing brace to protect tissue healing.  - The patient will be independent amb with crutches on level tile for household distances.  - The patient will increase ROM by 15 degrees to perform ambulation and bathing and hygiene with pain < 0/10.  - The patient will increase strength by 1/2 muscle grade to perform ambulation and bathing and hygiene with pain < 0/10.    Long-Term Goals: 36 weeks  - The patient will be independent with home exercise program and symptom management.  - The patient will be independent amb with no assistive device on all surfaces for community distances.  - The patient will increase ROM = to uninvolved knee to perform ambulation, toileting, dressing and recreation/leisure activities  with pain < 0/10.  - The patient will increase strength = to uninvolved knee  to perform ambulation, toileting, dressing and recreation/leisure activities   with pain < 0/10.      Plan     Continue with established Plan of Care towards PT goals with focus on decreasing pain, increasing ROM, strength, neuromuscular control and functional status       Sharan Short, PT

## 2020-01-22 ENCOUNTER — OFFICE VISIT (OUTPATIENT)
Dept: SPORTS MEDICINE | Facility: CLINIC | Age: 35
End: 2020-01-22
Payer: COMMERCIAL

## 2020-01-22 ENCOUNTER — PATIENT MESSAGE (OUTPATIENT)
Dept: REHABILITATION | Facility: HOSPITAL | Age: 35
End: 2020-01-22

## 2020-01-22 ENCOUNTER — TELEPHONE (OUTPATIENT)
Dept: OBSTETRICS AND GYNECOLOGY | Facility: CLINIC | Age: 35
End: 2020-01-22

## 2020-01-22 VITALS
WEIGHT: 180 LBS | HEART RATE: 81 BPM | DIASTOLIC BLOOD PRESSURE: 65 MMHG | BODY MASS INDEX: 27.28 KG/M2 | SYSTOLIC BLOOD PRESSURE: 112 MMHG | HEIGHT: 68 IN

## 2020-01-22 DIAGNOSIS — G89.29 CHRONIC PAIN OF RIGHT KNEE: ICD-10-CM

## 2020-01-22 DIAGNOSIS — M25.561 CHRONIC PAIN OF RIGHT KNEE: ICD-10-CM

## 2020-01-22 DIAGNOSIS — M62.81 MUSCLE WEAKNESS OF LOWER EXTREMITY: ICD-10-CM

## 2020-01-22 DIAGNOSIS — M25.661 DECREASED RANGE OF MOTION (ROM) OF RIGHT KNEE: Primary | ICD-10-CM

## 2020-01-22 DIAGNOSIS — M25.561 ACUTE PAIN OF RIGHT KNEE: ICD-10-CM

## 2020-01-22 PROCEDURE — 99024 POSTOP FOLLOW-UP VISIT: CPT | Mod: S$GLB,,, | Performed by: ORTHOPAEDIC SURGERY

## 2020-01-22 PROCEDURE — 99999 PR PBB SHADOW E&M-EST. PATIENT-LVL III: ICD-10-PCS | Mod: PBBFAC,,, | Performed by: ORTHOPAEDIC SURGERY

## 2020-01-22 PROCEDURE — 99024 PR POST-OP FOLLOW-UP VISIT: ICD-10-PCS | Mod: S$GLB,,, | Performed by: ORTHOPAEDIC SURGERY

## 2020-01-22 PROCEDURE — 99999 PR PBB SHADOW E&M-EST. PATIENT-LVL III: CPT | Mod: PBBFAC,,, | Performed by: ORTHOPAEDIC SURGERY

## 2020-01-22 RX ORDER — CELECOXIB 200 MG/1
200 CAPSULE ORAL 2 TIMES DAILY
Qty: 60 CAPSULE | Refills: 2 | Status: SHIPPED | OUTPATIENT
Start: 2020-01-22 | End: 2020-02-21

## 2020-01-22 NOTE — TELEPHONE ENCOUNTER
Voicemail message left for pt to call and schedule appt.       ----- Message from Cheo Crane sent at 1/22/2020  3:58 PM CST -----  Contact: Pt   Name of Who is Calling: FATUMA ESPINOZA [[id number]]    What is the request in detail: Pt is requesting a call back regards est care with a midwife the pt is 10wks pregnant.............  Please contact to further discuss and advise      Can the clinic reply by [facility]: Yes     [facility]  [phone] (home)

## 2020-01-22 NOTE — PATIENT INSTRUCTIONS
Closed chain and limited open chain rehabilitation recommended  Patellofemoral irritation from CORE and Quad/hip flexor weakness  Increase exercycle and elliptical / Aquatic program

## 2020-01-22 NOTE — PROGRESS NOTES
Subjective:          Chief Complaint: Klarissa Brown is a 34 y.o. female who had concerns including Post-op Evaluation of the Right Knee.    Patient presents to clinic for 15 week post-op evaluation of right knee. Pain today is 0/10. She has recently noticed an increase in patella tendon pain and discomfort with walking over the last few days. She recently walked a few miles and this is when she noticed the pain about her patella tendon.  She is with working Sharan Short, PT at [place]. She is is not currently taking Celebrex 200mg BID. She is no longer taking pain medication. Denies nausea, vomiting, fever, chills, CP, and SOB.     DATE OF PROCEDURE: 10/10/2019  ATTENDING SURGEON: Surgeon(s) and Role:     * Lázaro Cody MD - Primary     Assistants:  Hill Smith MD - Fellow  Katt Smith PA-C     PREOPERATIVE DIAGNOSIS:  Right  Anterior Cruciate Ligament Tear S83.510     POSTOPERATIVE DIAGNOSIS:   Right  Anterior Cruciate Ligament Tear S83.510     PROCEDURES(S) PERFORMED:   1. Right  Arthroscopy, anterior cruciate ligament reconstruction 73585  2.  Right  Arthroscopy, knee, synovectomy, limited 19959             Review of Systems   Constitution: Negative. Negative for chills, fever, weight gain and weight loss.   HENT: Negative.  Negative for congestion and sore throat.    Eyes: Negative.  Negative for blurred vision and double vision.   Cardiovascular: Negative.  Negative for chest pain, leg swelling and palpitations.   Respiratory: Negative.  Negative for cough and shortness of breath.    Endocrine: Negative.    Hematologic/Lymphatic: Negative.  Does not bruise/bleed easily.   Skin: Negative.  Negative for itching, poor wound healing and rash.   Musculoskeletal: Positive for joint pain, joint swelling and stiffness. Negative for back pain, muscle weakness and myalgias.   Gastrointestinal: Negative.  Negative for abdominal pain, constipation, diarrhea, nausea and vomiting.   Genitourinary: Negative.   Negative for frequency and hematuria.   Neurological: Negative.  Negative for dizziness, headaches, numbness, paresthesias and sensory change.   Psychiatric/Behavioral: Negative.  Negative for altered mental status and depression. The patient is not nervous/anxious.    Allergic/Immunologic: Negative.  Negative for hives.       Pain Related Questions  Over the past 3 days, what was your average pain during activity? (I.e. running, jogging, walking, climbing stairs, getting dressed, ect.): 3  Over the past 3 days, what was your highest pain level?: 4  Over the past 3 days, what was your lowest pain level? : 0    Other  How many nights a week are you awakened by your affected body part?: 0  Was the patient's HEIGHT measured or patient reported?: Patient Reported  Was the patient's WEIGHT measured or patient reported?: Measured      Objective:        General: Klarissa is well-developed, well-nourished, appears stated age, in no acute distress, alert and oriented to time, place and person.     General    Vitals reviewed.  Constitutional: She is oriented to person, place, and time. She appears well-developed and well-nourished. No distress.   HENT:   Mouth/Throat: No oropharyngeal exudate.   Eyes: Right eye exhibits no discharge. Left eye exhibits no discharge.   Neck: Normal range of motion.   Cardiovascular: Normal rate and regular rhythm.    Pulmonary/Chest: Effort normal and breath sounds normal. No respiratory distress.   Neurological: She is alert and oriented to person, place, and time. She has normal reflexes. No cranial nerve deficit. Coordination normal.   Psychiatric: She has a normal mood and affect. Her behavior is normal. Judgment and thought content normal.     General Musculoskeletal Exam   Gait: normal       Right Knee Exam     Inspection   Erythema: absent  Scars: present  Swelling: absent  Effusion: absent  Deformity: absent  Bruising: absent    Tenderness   The patient is tender to palpation of the  patella.    Range of Motion   Extension: 0   Flexion:  130 abnormal     Tests   Meniscus   Jennifer:  Medial - negative Lateral - negative  Ligament Examination Lachman: normal (-1 to 2mm) PCL-Posterior Drawer: normal (0 to 2mm)     MCL - Valgus: normal (0 to 2mm)  LCL - Varus: normalPivot Shift: normal (Equal)Reverse Pivot Shift: normal (Equal)Dial Test at 30 degrees: normal (< 5 degrees)Dial Test at 90 degrees: normal (< 5 degrees)  Posterior Sag Test: negative  Posterolateral Corner: unstable (>15 degrees difference)  Patella   Patellar apprehension: negative  Passive Patellar Tilt: neutral  Patellar Tracking: normal  Patellar Glide (quadrants): Lateral - 1   Medial - 2  Q-Angle at 90 degrees: normal  Patellar Grind: negative  J-Sign: none    Other   Meniscal Cyst: absent  Popliteal (Baker's) Cyst: absent  Sensation: normal    Comments:  Portal sutures removed. No signs of infection or necrosis. No purulent drainage. NVI.     Left Knee Exam     Inspection   Erythema: absent  Scars: absent  Swelling: absent  Effusion: absent  Deformity: absent  Bruising: absent    Tenderness   The patient is experiencing no tenderness.     Range of Motion   Extension: 0   Flexion: 140     Tests   Meniscus   Jennifer:  Medial - negative Lateral - negative  Stability Lachman: normal (-1 to 2mm) PCL-Posterior Drawer: normal (0 to 2mm)  MCL - Valgus: normal (0 to 2mm)  LCL - Varus: normal (0 to 2mm)Pivot Shift: normal (Equal)Reverse Pivot Shift: normal (Equal)Dial Test at 30 degrees: normal (< 5 degrees)Dial Test at 90 degrees: normal (< 5 degrees)  Posterior Sag Test: negative  Posterolateral Corner: unstable (>15 degrees difference)  Patella   Patellar apprehension: negative  Passive Patellar Tilt: neutral  Patellar Tracking: normal  Patellar Glide (Quadrants): Lateral - 1 Medial - 2  Q-Angle at 90 degrees: normal  Patellar Grind: negative  J-Sign: J sign absent    Other   Meniscal Cyst: absent  Popliteal (Baker's) Cyst:  absent  Sensation: normal    Right Hip Exam     Tests   Eloisa: negative  Left Hip Exam     Tests   Eloisa: negative          Muscle Strength   Right Lower Extremity   Hip Abduction: 5/5   Quadriceps:  4/5   Hamstrin/5   Left Lower Extremity   Hip Abduction: 5/5   Quadriceps:  5/5   Hamstrin/5     Reflexes     Left Side  Quadriceps:  2+  Achilles:  2+    Right Side   Quadriceps:  2+  Achilles:  2+    Vascular Exam     Right Pulses  Dorsalis Pedis:      2+  Posterior Tibial:      2+        Left Pulses  Dorsalis Pedis:      2+  Posterior Tibial:      2+          RADIOGRAPHS:  Right knee:  FINDINGS:  Two views: There is ACL repair.  No acute fracture dislocation bone destruction or complication seen.        Assessment:       Encounter Diagnoses   Name Primary?    Decreased range of motion (ROM) of right knee Yes    Acute pain of right knee     Muscle weakness of lower extremity     Chronic pain of right knee           Plan:       1. IKDC, SF-12 and KOOS was filled out today in clinic.     RTC in 6 weeks with Dr. Lázaro Cody Patient will fill out IKDC, SF-12 and KOOS and bilateral knee series on return.     2. PT orders placed today- Sharan Short PT    3. Celebrex 200mg BID- sent to pharmacy    4.  May swim  Start CORE program  Exercycle 30-45 min.                      Patient questionnaires may have been collected.

## 2020-01-23 ENCOUNTER — PATIENT MESSAGE (OUTPATIENT)
Dept: OBSTETRICS AND GYNECOLOGY | Facility: CLINIC | Age: 35
End: 2020-01-23

## 2020-01-27 ENCOUNTER — PATIENT OUTREACH (OUTPATIENT)
Dept: ADMINISTRATIVE | Facility: OTHER | Age: 35
End: 2020-01-27

## 2020-01-28 ENCOUNTER — INITIAL PRENATAL (OUTPATIENT)
Dept: OBSTETRICS AND GYNECOLOGY | Facility: CLINIC | Age: 35
End: 2020-01-28
Payer: COMMERCIAL

## 2020-01-28 VITALS — WEIGHT: 183.44 LBS | SYSTOLIC BLOOD PRESSURE: 120 MMHG | DIASTOLIC BLOOD PRESSURE: 76 MMHG | BODY MASS INDEX: 28.3 KG/M2

## 2020-01-28 DIAGNOSIS — Z13.9 RISK AND FUNCTIONAL ASSESSMENT: ICD-10-CM

## 2020-01-28 DIAGNOSIS — Z34.90 PREGNANCY, UNSPECIFIED GESTATIONAL AGE: ICD-10-CM

## 2020-01-28 DIAGNOSIS — A63.0 GENITAL WARTS DUE TO HPV (HUMAN PAPILLOMAVIRUS): ICD-10-CM

## 2020-01-28 DIAGNOSIS — A60.9 HSV (HERPES SIMPLEX VIRUS) ANOGENITAL INFECTION: ICD-10-CM

## 2020-01-28 PROCEDURE — 99999 PR PBB SHADOW E&M-EST. PATIENT-LVL II: CPT | Mod: PBBFAC,,, | Performed by: ADVANCED PRACTICE MIDWIFE

## 2020-01-28 PROCEDURE — 99999 PR PBB SHADOW E&M-EST. PATIENT-LVL II: ICD-10-PCS | Mod: PBBFAC,,, | Performed by: ADVANCED PRACTICE MIDWIFE

## 2020-01-28 NOTE — PROGRESS NOTES
"  34 y.o.,  at 11w3d by 8wk US    Patient presents today for a transfer initial prenatal visit. She reports she has been receiving regular, routine prenatal care with Dr. Elissa Fallon. She is here today by herself.    Doing well overall. She does state that she had ACL repair surgery in October, and got pregnant soon after, so her knee still aches sometimes. She is seeing a physical therapist to help with her recovery from the surgery.    SOCIAL HISTORY: Denies emotional/mental/physical/sexual violence or abuse. Feels safe at home. She is here today by herself. In a monogamous relationship with father of baby, Jay. First pregnancy. First baby for both.       Patient reports her prepregnancy weight: 79 kg. TW lbs     Patient reports most recent pap smear: 2019, Results: ASCUS with positive "other" HRHPV. Colposcopy with LEEP done: CIN2. Repeat cotesting at postpartum follow-up visit.    Unable to hear heart tones via doppler at this visit. Discussed with patient that she may have an ultrasound scheduled to visualize the fetal heart rate, or she may return on a later date to try hearing heart tones via doppler. She declines both at this time and states that she will call to get one of them scheduled if she decides to do it later.    ROS  OBSTETRICS:   Contractions No   Bleeding No   Loss of fluid No   Fetal mvmnt:   No    GASTRO:   Nausea No   Vomiting No      OB History    Para Term  AB Living   1 0 0 0 0 0   SAB TAB Ectopic Multiple Live Births   0 0 0 0        # Outcome Date GA Lbr Nathaniel/2nd Weight Sex Delivery Anes PTL Lv   1 Current                Dating reviewed  Allergies and problem list reviewed and updated  Medical and surgical history reviewed    PHYSICAL EXAM  /76   Wt 83.2 kg (183 lb 6.8 oz)   LMP 11/10/2019 (Exact Date)   BMI 28.30 kg/m²     GENERAL: No acute distress  HEENT: Normocephalic, atruamatic  NEURO: Alert and oriented x3  PSYCH: Calm, normal mood and " affect  PULMONARY: Non-labored respiration; no tachypnea  ABD: Soft, gravid, nontender    ASSESSMENT AND PLAN    Problem List     No episode was linked to this visit.          Patient's prenatal records from Dr. Fallon are visible in Epic.  Initial labs and dating u/s reviewed.   Counseled today on proper weight gain based on the [employer]'s recommendations based on her pre-pregnancy weight. Discussed excessive weight gain allowance, which would risk her out of the ABC (and would plan for delivery on L&D), but not midwifery care. Reviewed potential risks to excessive weight gain.  .BMI (prepregnancy)  -- Discussed IOM recommended weight gain of:   Weight category Pre pre BMI  Recommended TWG   Underweight Less than 18.5 28-40    Normal Weight 18.5-24.9  25-35    Overweight 25-29.9  15-25    Obese   30 and greater  11-20     -- Discussed criteria for delivery at ABC r/t excessive pre-preg weight or excessive weight gain:   Pre-pregnancy BMI over 40 or excess pregnancy weight gain defined as:   Pre-preg BMI < 18.5; Excess weight gain = > 60 pound   Pre-preg BMI 18.5-24.9;  Excess weight gain = > 53 pounds   Pre-preg BMI 25-29.9;  Excess weight gain = > 38 pounds   Pre-preg BMI > 30;  Excess weight gain = > 30 pounds    Review exercise precautions in pregnancy, along with recommendations.  Discussed substances & foods to avoid in pregnancy (i.e. sushi, fish that are high in mercury, deli meat, and unpasteurized cheeses).   Discussed prenatal vitamin options (i.e. stool softener, DHA).    Patient was oriented to practice and progression of routine prenatal care.   Reviewed New OB Pregnancy packet & questions answered.    Reviewed aneuploidy screening options and indications, questions answered - patient already had MT21 done, and she is considering AFP. She and her  have already had carrier screening done with [employer], and patient reports that there were no concerns following the carrier  screening.  Education regarding warning signs.    Follow up: 4 wks, call or present sooner prn.

## 2020-01-29 ENCOUNTER — PATIENT MESSAGE (OUTPATIENT)
Dept: OBSTETRICS AND GYNECOLOGY | Facility: CLINIC | Age: 35
End: 2020-01-29

## 2020-01-29 ENCOUNTER — CLINICAL SUPPORT (OUTPATIENT)
Dept: REHABILITATION | Facility: HOSPITAL | Age: 35
End: 2020-01-29
Attending: ORTHOPAEDIC SURGERY
Payer: COMMERCIAL

## 2020-01-29 DIAGNOSIS — G89.29 CHRONIC PAIN OF RIGHT KNEE: Primary | ICD-10-CM

## 2020-01-29 DIAGNOSIS — M25.661 DECREASED RANGE OF MOTION (ROM) OF RIGHT KNEE: ICD-10-CM

## 2020-01-29 DIAGNOSIS — M62.81 MUSCLE WEAKNESS OF LOWER EXTREMITY: ICD-10-CM

## 2020-01-29 DIAGNOSIS — M25.561 CHRONIC PAIN OF RIGHT KNEE: Primary | ICD-10-CM

## 2020-01-29 PROCEDURE — 97110 THERAPEUTIC EXERCISES: CPT | Performed by: PHYSICAL THERAPIST

## 2020-01-29 NOTE — PROGRESS NOTES
Physical Therapy Daily Treatment Note     Visit Date: 1/29/2020    Name: Klarissa Brown  Clinic Number: [id number]    Therapy Diagnosis:   Encounter Diagnoses   Name Primary?    Decreased range of motion (ROM) of right knee     Muscle weakness of lower extremity     Chronic pain of right knee Yes     Physician: Lázaro Cody MD Dr Jones     PROCEDURES(S) PERFORMED:   1. Right  Arthroscopy, anterior cruciate ligament reconstruction 72152  2.  Right  Arthroscopy, knee, synovectomy, limited 01958    GRAFT SOURCE:  Hamstring auto graft     DATE OF PROCEDURE: 10/10/2019     Physician Orders: PT Eval and Treat   Medical Diagnosis: Z98.890 (ICD-10-CM) - S/P ACL reconstruction  Evaluation Date: 11/14/2019  Authorization Period Expiration: 12/31/2019  Plan of Care Certification Period: 8/14/2020  Visit # / Visits authorized:  3/   In 2020  (9 total)     Time In:  11:30  Time Out: 12:30  Total Billable Time: 30 minutes    Precautions: Standard + PHYSICAL THERAPY:  The patient should begin physical therapy on postoperative   day #3 and will be advanced to outpatient therapy as soon as   Possible following discharge.     Weight bearing:as tolerated  right leg  Range of Motion:Full normal motion symmetric to opposite side     No CPM required due to procedure performed   to begin quad sets with a heel roll to obtain hyperextension, straight leg raise and heel slides with the heel supported in a closed-chain fashion     Immediate specific exercises should include:   Gait program should include the above stated weightbearing; in addition: active extension with a heel-to-toe gait working on maintaining extension     Immobilizer if present should be locked @-10 degrees with gait and allowed to flex to 120 degrees at rest.          to begin quad sets with a heel roll to obtain hyperextension, straight leg raise and heel slides with the heel supported in a closed-chain fashion     Immediate specific exercises should  include:   Gait program should include the above stated weightbearing; in  Medication(s): Preoperative Epic medication regimen        Subjective      Pt reports worsening patellar tendon sxs, did see MD who recommended taking Celebrex but pt would prefer not to take it at this time due to another medical condition   Pt rec'd very little relief from patellar tendon taping LV    she was compliant with home exercise program given last session.   Response to previous treatment:good   Functional change: see subjective above     Pain: 310 at rest and with level surface ambulation, however, with squatting, 4/10   Location: right knee  Patellar tendon region     Objective         Measurements taken:  continued  (+) TTP over patellar tendon   (PROM:  2/0/130 (prone) vs 2/0/144 on L )    Guilherme received therapeutic exercises to develop strength, endurance, ROM and flexibility for 30 minutes  including:    Bike x 5'   Supine SLR  2x10:05  0#  Supine PNF D1/D2 2x15  St ham curls 2x15:05 0#  St  HR 3x15  Seated ham curls MR 3x15  Prone ham curls 2x15:01 0#  PROM prone k' flex + RF and prone 3xs each    Ice msg x 5'      Home Exercises Provided and Patient Education Provided     Education provided:   -  None this visit     Written Home Exercises Provided:   Exercises were reviewed and Guilherme was able to demonstrate them prior to the end of the session.  Guilherme demonstrated good  understanding of the education provided.    See EMR under hand out  for exercises provided for this visit  1/29/2020      Assessment     michael Rx without increase in sxs,  Continued sxs in patellar tendon post Rx but maybe slightly decreased per pt report      Guilherme is progressing well towards her goals.   Prognosis is good .     Pt will continue to benefit from skilled outpatient physical therapy to address the deficits listed in the problem list box on initial evaluation, provide pt/family education and to maximize pt's level of independence in the  home and community environment.     Pt's spiritual, cultural and educational needs considered and pt agreeable to plan of care and goals.    Anticipated barriers to physical therapy: none    Goals:     Short-Term Goals: 6  weeks  - The patient will be independent with initial home exercise program.  - The patient is independent with donning/doffing brace to protect tissue healing.  - The patient will be independent amb with crutches on level tile for household distances.  - The patient will increase ROM by 15 degrees to perform ambulation and bathing and hygiene with pain < 0/10.  - The patient will increase strength by 1/2 muscle grade to perform ambulation and bathing and hygiene with pain < 0/10.    Long-Term Goals: 36 weeks  - The patient will be independent with home exercise program and symptom management.  - The patient will be independent amb with no assistive device on all surfaces for community distances.  - The patient will increase ROM = to uninvolved knee to perform ambulation, toileting, dressing and recreation/leisure activities  with pain < 0/10.  - The patient will increase strength = to uninvolved knee  to perform ambulation, toileting, dressing and recreation/leisure activities   with pain < 0/10.      Plan     Continue with established Plan of Care towards PT goals with focus on decreasing pain, increasing ROM, strength, neuromuscular control and functional status       Sharan Short, PT

## 2020-01-30 ENCOUNTER — PATIENT MESSAGE (OUTPATIENT)
Dept: OBSTETRICS AND GYNECOLOGY | Facility: CLINIC | Age: 35
End: 2020-01-30

## 2020-01-30 ENCOUNTER — ROUTINE PRENATAL (OUTPATIENT)
Dept: OBSTETRICS AND GYNECOLOGY | Facility: CLINIC | Age: 35
End: 2020-01-30
Payer: COMMERCIAL

## 2020-01-30 ENCOUNTER — TELEPHONE (OUTPATIENT)
Dept: OBSTETRICS AND GYNECOLOGY | Facility: CLINIC | Age: 35
End: 2020-01-30

## 2020-01-30 DIAGNOSIS — Z34.01 ENCOUNTER FOR SUPERVISION OF NORMAL FIRST PREGNANCY IN FIRST TRIMESTER: Primary | ICD-10-CM

## 2020-01-30 PROCEDURE — 99999 PR PBB SHADOW E&M-EST. PATIENT-LVL I: ICD-10-PCS | Mod: PBBFAC,,, | Performed by: ADVANCED PRACTICE MIDWIFE

## 2020-01-30 PROCEDURE — 99212 OFFICE O/P EST SF 10 MIN: CPT | Mod: S$GLB,,, | Performed by: ADVANCED PRACTICE MIDWIFE

## 2020-01-30 PROCEDURE — 99999 PR PBB SHADOW E&M-EST. PATIENT-LVL I: CPT | Mod: PBBFAC,,, | Performed by: ADVANCED PRACTICE MIDWIFE

## 2020-01-30 PROCEDURE — 99212 PR OFFICE/OUTPT VISIT, EST, LEVL II, 10-19 MIN: ICD-10-PCS | Mod: S$GLB,,, | Performed by: ADVANCED PRACTICE MIDWIFE

## 2020-01-30 NOTE — TELEPHONE ENCOUNTER
Daphne Kenny RN returned pt call regarding not hearing FHR @ clinic visit day before yesterday.  She will come in today for a fhr check

## 2020-02-02 PROBLEM — Z13.9 RISK AND FUNCTIONAL ASSESSMENT: Status: ACTIVE | Noted: 2020-02-02

## 2020-02-02 PROBLEM — Z34.90 PREGNANT: Status: ACTIVE | Noted: 2020-02-02

## 2020-02-03 ENCOUNTER — PATIENT MESSAGE (OUTPATIENT)
Dept: OBSTETRICS AND GYNECOLOGY | Facility: CLINIC | Age: 35
End: 2020-02-03

## 2020-02-06 ENCOUNTER — CLINICAL SUPPORT (OUTPATIENT)
Dept: REHABILITATION | Facility: HOSPITAL | Age: 35
End: 2020-02-06
Attending: ORTHOPAEDIC SURGERY
Payer: COMMERCIAL

## 2020-02-06 DIAGNOSIS — M62.81 MUSCLE WEAKNESS OF LOWER EXTREMITY: ICD-10-CM

## 2020-02-06 DIAGNOSIS — G89.29 CHRONIC PAIN OF RIGHT KNEE: Primary | ICD-10-CM

## 2020-02-06 DIAGNOSIS — M25.661 DECREASED RANGE OF MOTION (ROM) OF RIGHT KNEE: ICD-10-CM

## 2020-02-06 DIAGNOSIS — M25.561 CHRONIC PAIN OF RIGHT KNEE: Primary | ICD-10-CM

## 2020-02-06 PROCEDURE — 97110 THERAPEUTIC EXERCISES: CPT | Performed by: PHYSICAL THERAPIST

## 2020-02-06 NOTE — PROGRESS NOTES
Physical Therapy Daily Treatment Note     Visit Date: 2/6/2020    Name: Klarissa Brown  Clinic Number: [id number]    Therapy Diagnosis:   Encounter Diagnoses   Name Primary?    Decreased range of motion (ROM) of right knee     Muscle weakness of lower extremity     Chronic pain of right knee Yes     Physician: Katt Smith, RAFAELA Cody     PROCEDURES(S) PERFORMED:   1. Right  Arthroscopy, anterior cruciate ligament reconstruction 36889  2.  Right  Arthroscopy, knee, synovectomy, limited 97031    GRAFT SOURCE:  Hamstring auto graft     DATE OF PROCEDURE: 10/10/2019     Physician Orders: PT Eval and Treat   Medical Diagnosis: Z98.890 (ICD-10-CM) - S/P ACL reconstruction  Evaluation Date: 11/14/2019  Authorization Period Expiration: 12/31/2019  Plan of Care Certification Period: 8/14/2020  Visit # / Visits authorized:  4/   In 2020  (10 total)     Time In:  1300  Time Out: 14:00  Total Billable Time: 45 minutes    Precautions: Standard + PHYSICAL THERAPY:  The patient should begin physical therapy on postoperative   day #3 and will be advanced to outpatient therapy as soon as   Possible following discharge.     Weight bearing:as tolerated  right leg  Range of Motion:Full normal motion symmetric to opposite side     No CPM required due to procedure performed   to begin quad sets with a heel roll to obtain hyperextension, straight leg raise and heel slides with the heel supported in a closed-chain fashion     Immediate specific exercises should include:   Gait program should include the above stated weightbearing; in addition: active extension with a heel-to-toe gait working on maintaining extension     Immobilizer if present should be locked @-10 degrees with gait and allowed to flex to 120 degrees at rest.          to begin quad sets with a heel roll to obtain hyperextension, straight leg raise and heel slides with the heel supported in a closed-chain fashion     Immediate specific exercises should  include:   Gait program should include the above stated weightbearing; in  Medication(s): Preoperative Epic medication regimen        Subjective      Pt reports undergoing accupuncture Rx with good relief of patellar tendon sxs    she was compliant with home exercise program given last session.   Response to previous treatment:good   Functional change: see subjective above     Pain: 310 at rest and with level surface ambulation, however, with squatting, 4/10   Location: right knee  Patellar tendon region     Objective         Measurements taken:  decreased  (+) TTP over patellar tendon   (PROM:  2/0/130 (prone) vs 2/0/144 on L )    Guilherme received therapeutic exercises to develop strength, endurance, ROM and flexibility for 30 minutes  including:    Bike x 10'  5.0  Supine SLR  1x20:05  0#  LP B 100# 3x15  HR on LP B 100# 3x15  Seated HS ham curl 3x15 B  17.5#  Hip abd band walks 3xburn Or  SLS 5x:15 foam R/L   Self ROM prone k' flex + RF and prone 5x:15  PROM k' flex 3xs      Home Exercises Provided and Patient Education Provided     Education provided:   -  None this visit     Written Home Exercises Provided:   Exercises were reviewed and Guilherme was able to demonstrate them prior to the end of the session.  Guilherme demonstrated good  understanding of the education provided.    See EMR under hand out  for exercises provided for this visit  1/29/2020      Assessment     michael Rx without increase in sxs,  Still unable to fully load quadriceps due to patellar tendon sxs      Guilherme is progressing well towards her goals.   Prognosis is good .     Pt will continue to benefit from skilled outpatient physical therapy to address the deficits listed in the problem list box on initial evaluation, provide pt/family education and to maximize pt's level of independence in the home and community environment.     Pt's spiritual, cultural and educational needs considered and pt agreeable to plan of care and goals.    Anticipated  barriers to physical therapy: none    Goals:     Short-Term Goals: 6  weeks  - The patient will be independent with initial home exercise program.  - The patient is independent with donning/doffing brace to protect tissue healing.  - The patient will be independent amb with crutches on level tile for household distances.  - The patient will increase ROM by 15 degrees to perform ambulation and bathing and hygiene with pain < 0/10.  - The patient will increase strength by 1/2 muscle grade to perform ambulation and bathing and hygiene with pain < 0/10.    Long-Term Goals: 36 weeks  - The patient will be independent with home exercise program and symptom management.  - The patient will be independent amb with no assistive device on all surfaces for community distances.  - The patient will increase ROM = to uninvolved knee to perform ambulation, toileting, dressing and recreation/leisure activities  with pain < 0/10.  - The patient will increase strength = to uninvolved knee  to perform ambulation, toileting, dressing and recreation/leisure activities   with pain < 0/10.      Plan     Continue with established Plan of Care towards PT goals with focus on decreasing pain, increasing ROM, strength, neuromuscular control and functional status       Sharan Short, PT

## 2020-02-13 ENCOUNTER — CLINICAL SUPPORT (OUTPATIENT)
Dept: REHABILITATION | Facility: HOSPITAL | Age: 35
End: 2020-02-13
Attending: ORTHOPAEDIC SURGERY
Payer: COMMERCIAL

## 2020-02-13 DIAGNOSIS — M25.661 DECREASED RANGE OF MOTION (ROM) OF RIGHT KNEE: ICD-10-CM

## 2020-02-13 DIAGNOSIS — M62.81 MUSCLE WEAKNESS OF LOWER EXTREMITY: ICD-10-CM

## 2020-02-13 DIAGNOSIS — M25.561 CHRONIC PAIN OF RIGHT KNEE: Primary | ICD-10-CM

## 2020-02-13 DIAGNOSIS — G89.29 CHRONIC PAIN OF RIGHT KNEE: Primary | ICD-10-CM

## 2020-02-13 PROCEDURE — 97110 THERAPEUTIC EXERCISES: CPT | Performed by: PHYSICAL THERAPIST

## 2020-02-13 NOTE — PROGRESS NOTES
Physical Therapy Daily Treatment Note     Visit Date: 2/13/2020    Name: Klarissa Brown  Clinic Number: [id number]    Therapy Diagnosis:   Encounter Diagnoses   Name Primary?    Decreased range of motion (ROM) of right knee     Muscle weakness of lower extremity     Chronic pain of right knee Yes     Physician: Katt Smith, RAFAELA Cody     PROCEDURES(S) PERFORMED:   1. Right  Arthroscopy, anterior cruciate ligament reconstruction [id number]  2.  Right  Arthroscopy, knee, synovectomy, limited 74537    GRAFT SOURCE:  Hamstring auto graft     DATE OF PROCEDURE: 10/10/2019     Physician Orders: PT Eval and Treat   Medical Diagnosis: Z98.890 (ICD-10-CM) - S/P ACL reconstruction  Evaluation Date: 11/14/2019  Authorization Period Expiration: 12/31/2019  Plan of Care Certification Period: 8/14/2020  Visit # / Visits authorized:  5/   In 2020  (11 total)     Time In:  1500  Time Out: 16:00  Total Billable Time: 45 minutes    Precautions: Standard + PHYSICAL THERAPY:  The patient should begin physical therapy on postoperative   day #3 and will be advanced to outpatient therapy as soon as   Possible following discharge.     Weight bearing:as tolerated  right leg  Range of Motion:Full normal motion symmetric to opposite side     No CPM required due to procedure performed   to begin quad sets with a heel roll to obtain hyperextension, straight leg raise and heel slides with the heel supported in a closed-chain fashion     Immediate specific exercises should include:   Gait program should include the above stated weightbearing; in addition: active extension with a heel-to-toe gait working on maintaining extension     Immobilizer if present should be locked @-10 degrees with gait and allowed to flex to 120 degrees at rest.          to begin quad sets with a heel roll to obtain hyperextension, straight leg raise and heel slides with the heel supported in a closed-chain fashion     Immediate specific exercises should  "include:   Gait program should include the above stated weightbearing; in  Medication(s): Preoperative Epic medication regimen        Subjective      Pt reports decreased pain in her patellar tendon region, however, she may also be experiencing PF sxs, she also notes "I feel like I forgot how to walk"     she was compliant with home exercise program given last session.   Response to previous treatment:good   Functional change: see subjective above     Pain: 010 at rest and with level surface ambulation   Location: right knee  Patellar tendon region     Objective         Measurements taken:   2 fingerbreadths heel to buttocks in k' flexion   decreased  (+) TTP over patellar tendon   (PROM:  2/0/130 (prone) vs 2/0/144 on L )    Guilherme received therapeutic exercises to develop strength, endurance, ROM and flexibility for 45 minutes  including:    Retro TM 5% x 5' 1.0   Gait training on turf:  Erik walks 2 rounds on R, 2 rounds on L, 2 rounds sideways, and 2 rounds walking with ball toss   AMANUEL green FW/BW 2x15, sides R/L Or 2x15 each  ITIS 3x15 bball   Seated HS ham curl 3x15 MR   Prone ham curls 2x15 3#   PROM prone k' flex + RF and prone 3xs each  Prone ext hang 3'x1 0#     Ice msg x 5'   CP x 10'       Home Exercises Provided and Patient Education Provided     Education provided:   -  None this visit     Written Home Exercises Provided:   Exercises were reviewed and Guilherme was able to demonstrate them prior to the end of the session.  Guilherme demonstrated good  understanding of the education provided.    See EMR under hand out  for exercises provided for this visit  1/29/2020 add SLS on foam at home       Assessment     michael Rx without increase in sxs, poor balance noted, pt still lacks slight flexion ROM and walking improved slightly with gait training performed this session but pt continues to walk with stiff knee       Guilherme is progressing well towards her goals.   Prognosis is good .     Pt will continue to " benefit from skilled outpatient physical therapy to address the deficits listed in the problem list box on initial evaluation, provide pt/family education and to maximize pt's level of independence in the home and community environment.     Pt's spiritual, cultural and educational needs considered and pt agreeable to plan of care and goals.    Anticipated barriers to physical therapy: none    Goals:     Short-Term Goals: 6  weeks  - The patient will be independent with initial home exercise program. (MET)  - The patient is independent with donning/doffing brace to protect tissue healing. (MET)  - The patient will be independent amb with crutches on level tile for household distances.  - The patient will increase ROM by 15 degrees to perform ambulation and bathing and hygiene with pain < 0/10 (MET)  - The patient will increase strength by 1/2 muscle grade to perform ambulation and bathing and hygiene with pain < 0/10  (MET).    Long-Term Goals: 36 weeks  - The patient will be independent with home exercise program and symptom management.  - The patient will be independent amb with no assistive device on all surfaces for community distances.  - The patient will increase ROM = to uninvolved knee to perform ambulation, toileting, dressing and recreation/leisure activities  with pain < 0/10.  - The patient will increase strength = to uninvolved knee  to perform ambulation, toileting, dressing and recreation/leisure activities   with pain < 0/10.      Plan     Focus on restoring full k' flexion in next 2-3 session, normalizing gait and improving balance     Continue with established Plan of Care towards PT goals with focus on decreasing pain, increasing ROM, strength, neuromuscular control and functional status       Sharan Short, PT

## 2020-02-17 ENCOUNTER — TELEPHONE (OUTPATIENT)
Dept: OBSTETRICS AND GYNECOLOGY | Facility: HOSPITAL | Age: 35
End: 2020-02-17

## 2020-02-20 ENCOUNTER — CLINICAL SUPPORT (OUTPATIENT)
Dept: REHABILITATION | Facility: HOSPITAL | Age: 35
End: 2020-02-20
Attending: ORTHOPAEDIC SURGERY
Payer: COMMERCIAL

## 2020-02-20 DIAGNOSIS — G89.29 CHRONIC PAIN OF RIGHT KNEE: Primary | ICD-10-CM

## 2020-02-20 DIAGNOSIS — M25.661 DECREASED RANGE OF MOTION (ROM) OF RIGHT KNEE: ICD-10-CM

## 2020-02-20 DIAGNOSIS — M25.561 CHRONIC PAIN OF RIGHT KNEE: Primary | ICD-10-CM

## 2020-02-20 DIAGNOSIS — M62.81 MUSCLE WEAKNESS OF LOWER EXTREMITY: ICD-10-CM

## 2020-02-20 PROCEDURE — 97110 THERAPEUTIC EXERCISES: CPT | Performed by: PHYSICAL THERAPIST

## 2020-02-20 NOTE — PROGRESS NOTES
Physical Therapy Daily Treatment Note     Visit Date: 2/20/2020    Name: Klarissa Brown  Clinic Number: [id number]    Therapy Diagnosis:   Encounter Diagnoses   Name Primary?    Decreased range of motion (ROM) of right knee     Muscle weakness of lower extremity     Chronic pain of right knee Yes     Physician: Lázaro Cody MD Dr Jones     PROCEDURES(S) PERFORMED:   1. Right  Arthroscopy, anterior cruciate ligament reconstruction 45974  2.  Right  Arthroscopy, knee, synovectomy, limited 74568    GRAFT SOURCE:  Hamstring auto graft     DATE OF PROCEDURE: 10/10/2019     Physician Orders: PT Eval and Treat   Medical Diagnosis: Z98.890 (ICD-10-CM) - S/P ACL reconstruction  Evaluation Date: 11/14/2019  Authorization Period Expiration: 12/31/2019  Plan of Care Certification Period: 8/14/2020  Visit # / Visits authorized:  6/   In 2020  (12 total)     Time In:  1100  Time Out: 12:00  Total Billable Time: 30 minutes    Precautions: Standard + PHYSICAL THERAPY:  The patient should begin physical therapy on postoperative   day #3 and will be advanced to outpatient therapy as soon as   Possible following discharge.     Weight bearing:as tolerated  right leg  Range of Motion:Full normal motion symmetric to opposite side     No CPM required due to procedure performed   to begin quad sets with a heel roll to obtain hyperextension, straight leg raise and heel slides with the heel supported in a closed-chain fashion     Immediate specific exercises should include:   Gait program should include the above stated weightbearing; in addition: active extension with a heel-to-toe gait working on maintaining extension     Immobilizer if present should be locked @-10 degrees with gait and allowed to flex to 120 degrees at rest.          to begin quad sets with a heel roll to obtain hyperextension, straight leg raise and heel slides with the heel supported in a closed-chain fashion     Immediate specific exercises should  "include:   Gait program should include the above stated weightbearing; in  Medication(s): Preoperative Epic medication regimen        Subjective      Pt reports that she continues to do well in terms of patellar tendon issue, however, she also reports pain/soreness/cracking in anterolateral region of her R knee bárbara after prolonged sitting     she was compliant with home exercise program given last session.   Response to previous treatment:good   Functional change: see subjective above     Pain: 010 at rest and with level surface ambulation   Location: right knee  Patellar tendon region     Objective      ( approx 18.5 weeks post op)    Measurements taken:   2 fingerbreadths heel to buttocks in k' flexion   decreased  (+) TTP over patellar tendon   (PROM:  2/0/130 (prone) vs 2/0/144 on L )    Guilherme received therapeutic exercises to develop strength, endurance, ROM and flexibility for 45 minutes  including:    Stick ITB  ET x 7'   Sit up SLR 1x10:05 0#  Supine SLR 1x10:10 0#  B bridge with back on wt bench 3x10:05  Wall sit 5x:30   LP U 60-0 3x10 80#  LSU 2" 3x10  Self ROM prone k' flex + RF and prone 4x:15  Prone ext hang 3'x1 0#     CP x 10'       Home Exercises Provided and Patient Education Provided     Education provided:   -  None this visit     Written Home Exercises Provided:   Exercises were reviewed and Guilherme was able to demonstrate them prior to the end of the session.  Guilherme demonstrated good  understanding of the education provided.    See EMR under hand out  for exercises provided for this visit  2/20/2020  Add wall sits and LSU at home, use stick after prolonged sitting       Assessment     michael Rx without increase in sxs, anterolateral sxs resolved with use of stick to ITB      Guilherme is progressing well towards her goals.   Prognosis is good .     Pt will continue to benefit from skilled outpatient physical therapy to address the deficits listed in the problem list box on initial evaluation, " provide pt/family education and to maximize pt's level of independence in the home and community environment.     Pt's spiritual, cultural and educational needs considered and pt agreeable to plan of care and goals.    Anticipated barriers to physical therapy: none    Goals:     Short-Term Goals: 6  weeks  - The patient will be independent with initial home exercise program. (MET)  - The patient is independent with donning/doffing brace to protect tissue healing. (MET)  - The patient will be independent amb with crutches on level tile for household distances.  - The patient will increase ROM by 15 degrees to perform ambulation and bathing and hygiene with pain < 0/10 (MET)  - The patient will increase strength by 1/2 muscle grade to perform ambulation and bathing and hygiene with pain < 0/10  (MET).    Long-Term Goals: 36 weeks  - The patient will be independent with home exercise program and symptom management.  - The patient will be independent amb with no assistive device on all surfaces for community distances.  - The patient will increase ROM = to uninvolved knee to perform ambulation, toileting, dressing and recreation/leisure activities  with pain < 0/10.  - The patient will increase strength = to uninvolved knee  to perform ambulation, toileting, dressing and recreation/leisure activities   with pain < 0/10.      Plan     Focus on restoring full k' flexion in next 2-3 session, normalizing gait and improving balance     Continue with established Plan of Care towards PT goals with focus on decreasing pain, increasing ROM, strength, neuromuscular control and functional status       Sharan Short, PT

## 2020-02-24 ENCOUNTER — PATIENT OUTREACH (OUTPATIENT)
Dept: ADMINISTRATIVE | Facility: OTHER | Age: 35
End: 2020-02-24

## 2020-02-26 ENCOUNTER — CLINICAL SUPPORT (OUTPATIENT)
Dept: REHABILITATION | Facility: HOSPITAL | Age: 35
End: 2020-02-26
Attending: ORTHOPAEDIC SURGERY
Payer: COMMERCIAL

## 2020-02-26 DIAGNOSIS — M25.661 DECREASED RANGE OF MOTION (ROM) OF RIGHT KNEE: ICD-10-CM

## 2020-02-26 DIAGNOSIS — M25.561 CHRONIC PAIN OF RIGHT KNEE: Primary | ICD-10-CM

## 2020-02-26 DIAGNOSIS — G89.29 CHRONIC PAIN OF RIGHT KNEE: Primary | ICD-10-CM

## 2020-02-26 DIAGNOSIS — M62.81 MUSCLE WEAKNESS OF LOWER EXTREMITY: ICD-10-CM

## 2020-02-26 PROCEDURE — 97110 THERAPEUTIC EXERCISES: CPT | Performed by: PHYSICAL THERAPIST

## 2020-02-26 NOTE — PROGRESS NOTES
Physical Therapy Daily Treatment Note     Visit Date: 2/26/2020    Name: Klarissa Brown  Clinic Number: [id number]    Therapy Diagnosis:   Encounter Diagnoses   Name Primary?    Decreased range of motion (ROM) of right knee     Muscle weakness of lower extremity     Chronic pain of right knee Yes     Physician: Lázaro Cody MD Dr Jones     PROCEDURES(S) PERFORMED:   1. Right  Arthroscopy, anterior cruciate ligament reconstruction 65733  2.  Right  Arthroscopy, knee, synovectomy, limited 75542    GRAFT SOURCE:  Hamstring auto graft     DATE OF PROCEDURE: 10/10/2019     Physician Orders: PT Eval and Treat   Medical Diagnosis: Z98.890 (ICD-10-CM) - S/P ACL reconstruction  Evaluation Date: 11/14/2019  Authorization Period Expiration: 12/31/2019  Plan of Care Certification Period: 8/14/2020  Visit # / Visits authorized:  7/   In 2020  (13 total)     Time In:  1430  Time Out: 15:30  Total Billable Time: 45 minutes    Precautions: Standard + PHYSICAL THERAPY:  The patient should begin physical therapy on postoperative   day #3 and will be advanced to outpatient therapy as soon as   Possible following discharge.     Weight bearing:as tolerated  right leg  Range of Motion:Full normal motion symmetric to opposite side     No CPM required due to procedure performed   to begin quad sets with a heel roll to obtain hyperextension, straight leg raise and heel slides with the heel supported in a closed-chain fashion     Immediate specific exercises should include:   Gait program should include the above stated weightbearing; in addition: active extension with a heel-to-toe gait working on maintaining extension     Immobilizer if present should be locked @-10 degrees with gait and allowed to flex to 120 degrees at rest.          to begin quad sets with a heel roll to obtain hyperextension, straight leg raise and heel slides with the heel supported in a closed-chain fashion     Immediate specific exercises should  include:   Gait program should include the above stated weightbearing; in  Medication(s): Preoperative Epic medication regimen        Subjective      Pt reports that she took it easy this holiday, did not walk a lot and her knee doing well     she was compliant with home exercise program given last session.   Response to previous treatment:good   Functional change: see subjective above     Pain: 010 at rest and with level surface ambulation   Location: right knee  Patellar tendon region     Objective      ( approx 19.5 weeks post op)    Measurements taken:   Unable to perform single leg squat   decreased  (+) TTP over patellar tendon   (PROM:  2/0/130 (prone) vs 2/0/144 on L )    Guilherme received therapeutic exercises to develop strength, endurance, ROM and flexibility for 45 minutes  including:    Stick ITB  Hawk  to patellar tendon   TM 8% incline x 10' 2.8 mph  R S/L deep hip ER 3x15:01 2.5#  Clamshell green 2x15:05  B bridge 2x10:10 + hip abd green TB  PF taping  rev'd stairs step over step technique     CP x 10'       Home Exercises Provided and Patient Education Provided     Education provided:   -  None this visit     Written Home Exercises Provided:   Exercises were reviewed and Guilherme was able to demonstrate them prior to the end of the session.  Guilherme demonstrated good  understanding of the education provided.    See EMR under hand out  for exercises provided for this visit  2/20/2020  Add  use stick after prolonged sitting, deep hip ER, bridge, clamshell at home       Assessment     michael Rx without increase in sxs, however, pt unable to fully extend R knee due to PF sxs, however, was able to perform single leg squat and perform step over step up/down stairs following PF tape application     Guilherme is progressing well towards her goals.   Prognosis is good .     Pt will continue to benefit from skilled outpatient physical therapy to address the deficits listed in the problem list box on initial  evaluation, provide pt/family education and to maximize pt's level of independence in the home and community environment.     Pt's spiritual, cultural and educational needs considered and pt agreeable to plan of care and goals.    Anticipated barriers to physical therapy: none    Goals:     Short-Term Goals: 6  weeks  - The patient will be independent with initial home exercise program. (MET)  - The patient is independent with donning/doffing brace to protect tissue healing. (MET)  - The patient will be independent amb with crutches on level tile for household distances.  - The patient will increase ROM by 15 degrees to perform ambulation and bathing and hygiene with pain < 0/10 (MET)  - The patient will increase strength by 1/2 muscle grade to perform ambulation and bathing and hygiene with pain < 0/10  (MET).    Long-Term Goals: 36 weeks  - The patient will be independent with home exercise program and symptom management.  - The patient will be independent amb with no assistive device on all surfaces for community distances.  - The patient will increase ROM = to uninvolved knee to perform ambulation, toileting, dressing and recreation/leisure activities  with pain < 0/10.  - The patient will increase strength = to uninvolved knee  to perform ambulation, toileting, dressing and recreation/leisure activities   with pain < 0/10.      Plan     Focus on glut max and quad strengthening to decrease PF sxs     Continue with established Plan of Care towards PT goals with focus on decreasing pain, increasing ROM, strength, neuromuscular control and functional status       Sharan Short, PT

## 2020-02-27 ENCOUNTER — ROUTINE PRENATAL (OUTPATIENT)
Dept: OBSTETRICS AND GYNECOLOGY | Facility: CLINIC | Age: 35
End: 2020-02-27
Payer: COMMERCIAL

## 2020-02-27 VITALS
BODY MASS INDEX: 29.26 KG/M2 | WEIGHT: 189.63 LBS | SYSTOLIC BLOOD PRESSURE: 108 MMHG | DIASTOLIC BLOOD PRESSURE: 66 MMHG

## 2020-02-27 DIAGNOSIS — Z34.92 PRENATAL CARE IN SECOND TRIMESTER: Primary | ICD-10-CM

## 2020-02-27 PROCEDURE — 99999 PR PBB SHADOW E&M-EST. PATIENT-LVL II: ICD-10-PCS | Mod: PBBFAC,,, | Performed by: ADVANCED PRACTICE MIDWIFE

## 2020-02-27 PROCEDURE — 99999 PR PBB SHADOW E&M-EST. PATIENT-LVL II: CPT | Mod: PBBFAC,,, | Performed by: ADVANCED PRACTICE MIDWIFE

## 2020-02-27 NOTE — PROGRESS NOTES
"Chief Complaint   Patient presents with    Routine Prenatal Visit       34 y.o.,  at 15w5d  Sonogram at 8 weeks    Complaints today: none.  Doing well without concerns.  feeling flutters/fetal movement.  TW lbs     ROS  OBSTETRICS:   Contractions No   Bleeding No   Loss of fluid No    GASTRO:   Nausea No   Vomiting No      OB History    Para Term  AB Living   1 0 0 0 0 0   SAB TAB Ectopic Multiple Live Births   0 0 0 0        # Outcome Date GA Lbr Nathaniel/2nd Weight Sex Delivery Anes PTL Lv   1 Current                Dating reviewed  Allergies and problem list reviewed and updated  Medical and surgical history reviewed  Prenatal labs reviewed and updated    PHYSICAL EXAM  Wt 86 kg (189 lb 9.5 oz)   LMP 11/10/2019 (Exact Date)   BMI 29.26 kg/m²     GENERAL: No acute distress  HEENT: Normocephalic, atruamatic  NEURO: Alert and oriented x3  PSYCH: Calm, normal mood and affect  PULMONARY: Non-labored respiration; no tachypnea  ABD: Soft, gravid, nontender  FH =  between PS & umbilicus    ASSESSMENT AND PLAN    OLGA BURRIS Problems (from 20 to present)     Problem Noted Resolved    Pregnant 2020 by Nani Webster CNM No    Overview Addendum 2020  2:20 PM by Nani Webster CNM     Prepregnancy BMI: 27 (ABC max wt: 38 lb)  Pap: Patient reports most recent pap smear: 2019, Results: ASCUS with positive "other" HRHPV. Colposcopy with LEEP done: CIN2. Repeat cotesting at postpartum follow-up visit.  Dating - By 8wk   U/S -   Aneuploidy screening - Had MT21 done, awaiting results. Considering AFP.  Blood type: AB POS.  GDM screen -   Vaccines - [ ]Flu [ ]TDAP  GBS  [ ]Consents  Contraception -  Peds -   Circ -          Birth Center Risk Assessment: 0- Meets birth center guidelines 2020 by Nani Webster CNM No    Overview Signed 2020  2:21 PM by Nani Webster CNM     Birth Center Risk Assessment: 0- Meets birth center guidelines    0- CNM " management in ABC  1- CNM management on L&D  2- Consultation with OB to develop  plan of care  3- Collaborative CNM/OB management with delivery on L&D  4- Permanent referral of care to MD                   Pt has completed [id number] -  Education regarding AFP screening today and pt plans to have drawn next visit.  Pt has completed NT & first portion of Sequential Screen, second portion of Sequential Screen ordered. Pt will have drawn today.  Anatomy ultrasound with MFM ordered/discussed.  Reviewed warning signs and pregnancy precautions, along with how/when to call.  Follow up: 4 wks, call or present sooner prn.   Discussed headaches and heartburn comfort measures today.  Blue folder provided today (not previously reviewed--admission guidelines discussed and pointed out)  Weight gain discussed   Deann Gonzalez CNM, MSN  02/27/2020  5:55 PM

## 2020-03-03 ENCOUNTER — TELEPHONE (OUTPATIENT)
Dept: MATERNAL FETAL MEDICINE | Facility: CLINIC | Age: 35
End: 2020-03-03

## 2020-03-05 ENCOUNTER — PATIENT MESSAGE (OUTPATIENT)
Dept: MATERNAL FETAL MEDICINE | Facility: HOSPITAL | Age: 35
End: 2020-03-05

## 2020-03-09 ENCOUNTER — PATIENT MESSAGE (OUTPATIENT)
Dept: OBSTETRICS AND GYNECOLOGY | Facility: CLINIC | Age: 35
End: 2020-03-09

## 2020-03-10 ENCOUNTER — PATIENT OUTREACH (OUTPATIENT)
Dept: ADMINISTRATIVE | Facility: OTHER | Age: 35
End: 2020-03-10

## 2020-03-11 ENCOUNTER — CLINICAL SUPPORT (OUTPATIENT)
Dept: REHABILITATION | Facility: HOSPITAL | Age: 35
End: 2020-03-11
Attending: ORTHOPAEDIC SURGERY
Payer: COMMERCIAL

## 2020-03-11 ENCOUNTER — OFFICE VISIT (OUTPATIENT)
Dept: SPORTS MEDICINE | Facility: CLINIC | Age: 35
End: 2020-03-11
Payer: COMMERCIAL

## 2020-03-11 ENCOUNTER — PATIENT MESSAGE (OUTPATIENT)
Dept: MATERNAL FETAL MEDICINE | Facility: CLINIC | Age: 35
End: 2020-03-11

## 2020-03-11 VITALS
DIASTOLIC BLOOD PRESSURE: 77 MMHG | HEIGHT: 68 IN | SYSTOLIC BLOOD PRESSURE: 126 MMHG | HEART RATE: 82 BPM | BODY MASS INDEX: 28.64 KG/M2 | WEIGHT: 189 LBS

## 2020-03-11 DIAGNOSIS — M25.661 DECREASED RANGE OF MOTION (ROM) OF RIGHT KNEE: ICD-10-CM

## 2020-03-11 DIAGNOSIS — G89.29 CHRONIC PAIN OF RIGHT KNEE: Primary | ICD-10-CM

## 2020-03-11 DIAGNOSIS — G89.29 CHRONIC PAIN OF RIGHT KNEE: ICD-10-CM

## 2020-03-11 DIAGNOSIS — M25.561 CHRONIC PAIN OF RIGHT KNEE: Primary | ICD-10-CM

## 2020-03-11 DIAGNOSIS — M25.561 CHRONIC PAIN OF RIGHT KNEE: ICD-10-CM

## 2020-03-11 DIAGNOSIS — M25.561 ACUTE PAIN OF RIGHT KNEE: Primary | ICD-10-CM

## 2020-03-11 DIAGNOSIS — M62.81 MUSCLE WEAKNESS OF LOWER EXTREMITY: ICD-10-CM

## 2020-03-11 PROCEDURE — 99999 PR PBB SHADOW E&M-EST. PATIENT-LVL III: CPT | Mod: PBBFAC,,, | Performed by: ORTHOPAEDIC SURGERY

## 2020-03-11 PROCEDURE — 3008F PR BODY MASS INDEX (BMI) DOCUMENTED: ICD-10-PCS | Mod: CPTII,S$GLB,, | Performed by: ORTHOPAEDIC SURGERY

## 2020-03-11 PROCEDURE — 99214 PR OFFICE/OUTPT VISIT, EST, LEVL IV, 30-39 MIN: ICD-10-PCS | Mod: S$GLB,,, | Performed by: ORTHOPAEDIC SURGERY

## 2020-03-11 PROCEDURE — 97110 THERAPEUTIC EXERCISES: CPT | Performed by: PHYSICAL THERAPIST

## 2020-03-11 PROCEDURE — 99999 PR PBB SHADOW E&M-EST. PATIENT-LVL III: ICD-10-PCS | Mod: PBBFAC,,, | Performed by: ORTHOPAEDIC SURGERY

## 2020-03-11 PROCEDURE — 99214 OFFICE O/P EST MOD 30 MIN: CPT | Mod: S$GLB,,, | Performed by: ORTHOPAEDIC SURGERY

## 2020-03-11 PROCEDURE — 3008F BODY MASS INDEX DOCD: CPT | Mod: CPTII,S$GLB,, | Performed by: ORTHOPAEDIC SURGERY

## 2020-03-11 RX ORDER — DICLOFENAC SODIUM 10 MG/G
2 GEL TOPICAL 2 TIMES DAILY
Qty: 1 TUBE | Refills: 3 | Status: SHIPPED | OUTPATIENT
Start: 2020-03-11 | End: 2021-02-03

## 2020-03-11 NOTE — PROGRESS NOTES
"  Physical Therapy Daily Treatment Note     Visit Date: 3/11/2020    Name: Klarissa Brown  Clinic Number: [id number]    Therapy Diagnosis:   Encounter Diagnoses   Name Primary?    Decreased range of motion (ROM) of right knee     Muscle weakness of lower extremity     Chronic pain of right knee Yes     Physician: Lázaro Cody MD Dr Jones     PROCEDURES(S) PERFORMED:   1. Right  Arthroscopy, anterior cruciate ligament reconstruction 12000  2.  Right  Arthroscopy, knee, synovectomy, limited 38119    GRAFT SOURCE:  Hamstring auto graft     DATE OF PROCEDURE: 10/10/2019     Physician Orders: PT Eval and Treat   Medical Diagnosis: Z98.890 (ICD-10-CM) - S/P ACL reconstruction  Evaluation Date: 11/14/2019  Authorization Period Expiration: 12/31/2019  Plan of Care Certification Period: 8/14/2020  Visit # / Visits authorized:  8/   In 2020  (14 total)     Time In:  830  Time Out: 9:30  Total Billable Time: 45 minutes    Precautions: Standard       Subjective      Pt reports "I feel like it's getting a little better"  Pt able to move R LE from gas to brake easier and walking report is improved     she was compliant with home exercise program given last session.   Response to previous treatment:good   Functional change: see subjective above     Pain: 010 at rest and with level surface ambulation   Location: right knee  Patellar tendon region     Objective      ( approx 21.5 weeks post op)    Measurements taken:   None taken this visit   (Unable to perform single leg squat)   decreased  ()+) TTP over patellar tendon   (PROM:  2/0/130 (prone) vs 2/0/144 on L )    Guilherme received therapeutic exercises to develop strength, endurance, ROM and flexibility for 45 minutes  including:    Stick ITB  TM 8% incline x 10' 2.8 mph  Sit up SLR 1x10:05 0#  Supine SLR 1x10:10 0#  St quad sara into table 1x15:06  Seated quad sara into table 1x15:06  R S/L deep hip ER 3x15:01 2.5#  Clamshell green 2x15:05 R/L   Marching bridge " 2x10:01  Prone ham curls 2x15:01 5#  HS ham curl 3x10 10# U   Prone PROM k 'flex 3xs  Prone ext hang 3'x1 0#     CP x 10'       Home Exercises Provided and Patient Education Provided     Education provided:   -  None this visit     Written Home Exercises Provided:   Exercises were reviewed and Guilherme was able to demonstrate them prior to the end of the session.  Guilherme demonstrated good  understanding of the education provided.    See EMR under hand out  for exercises provided for this visit  2/20/2020  Add  use stick after prolonged sitting, deep hip ER, bridge, clamshell at home       Assessment     michael Rx without increase in sxs,  Unable to fully load quad due to anterior knee pain     Guilherme is progressing well towards her goals.   Prognosis is good .     Pt will continue to benefit from skilled outpatient physical therapy to address the deficits listed in the problem list box on initial evaluation, provide pt/family education and to maximize pt's level of independence in the home and community environment.     Pt's spiritual, cultural and educational needs considered and pt agreeable to plan of care and goals.    Anticipated barriers to physical therapy: none    Goals:     Short-Term Goals: 6  weeks  - The patient will be independent with initial home exercise program. (MET)  - The patient is independent with donning/doffing brace to protect tissue healing. (MET)  - The patient will be independent amb with crutches on level tile for household distances.  - The patient will increase ROM by 15 degrees to perform ambulation and bathing and hygiene with pain < 0/10 (MET)  - The patient will increase strength by 1/2 muscle grade to perform ambulation and bathing and hygiene with pain < 0/10  (MET).    Long-Term Goals: 36 weeks  - The patient will be independent with home exercise program and symptom management.  - The patient will be independent amb with no assistive device on all surfaces for community distances.  -  The patient will increase ROM = to uninvolved knee to perform ambulation, toileting, dressing and recreation/leisure activities  with pain < 0/10.  - The patient will increase strength = to uninvolved knee  to perform ambulation, toileting, dressing and recreation/leisure activities   with pain < 0/10.      Plan     Pt to see MD this AM     Focus on glut max and quad strengthening to decrease PF sxs     Continue with established Plan of Care towards PT goals with focus on decreasing pain, increasing ROM, strength, neuromuscular control and functional status       Sharan Short, PT

## 2020-03-11 NOTE — PROGRESS NOTES
Subjective:          Chief Complaint: Klarissa Brown is a 34 y.o. female who had concerns including Pain of the Right Knee.    Patient presents to clinic for5 months s/p right knee ACL reconstructions. Pain today is 0/10. She has seen an improvement in patella tendon pain and discomfort since her last visits. She is progressing to faster walking on the treadmill. She is completing SL balance, stepping up, and CORE strengthening.  She is with working Sharan Short, PT at [facility]. She is is not currently taking Celebrex 200mg BID. She is no longer taking pain medication. Denies nausea, vomiting, fever, chills, CP, and SOB.     DATE OF PROCEDURE: 10/10/2019  ATTENDING SURGEON: Surgeon(s) and Role:     * Lázaro Cody MD - Primary     Assistants:  Hill Smith MD - Fellow  Katt Smith PA-C     PREOPERATIVE DIAGNOSIS:  Right  Anterior Cruciate Ligament Tear S83.510     POSTOPERATIVE DIAGNOSIS:   Right  Anterior Cruciate Ligament Tear S83.510     PROCEDURES(S) PERFORMED:   1. Right  Arthroscopy, anterior cruciate ligament reconstruction 40046  2.  Right  Arthroscopy, knee, synovectomy, limited 16387             Review of Systems   Constitution: Negative. Negative for chills, fever, weight gain and weight loss.   HENT: Negative.  Negative for congestion and sore throat.    Eyes: Negative.  Negative for blurred vision and double vision.   Cardiovascular: Negative.  Negative for chest pain, leg swelling and palpitations.   Respiratory: Negative.  Negative for cough and shortness of breath.    Endocrine: Negative.    Hematologic/Lymphatic: Negative.  Does not bruise/bleed easily.   Skin: Negative.  Negative for itching, poor wound healing and rash.   Musculoskeletal: Positive for joint pain, joint swelling and stiffness. Negative for back pain, muscle weakness and myalgias.   Gastrointestinal: Negative.  Negative for abdominal pain, constipation, diarrhea, nausea and vomiting.   Genitourinary: Negative.  Negative  for frequency and hematuria.   Neurological: Negative.  Negative for dizziness, headaches, numbness, paresthesias and sensory change.   Psychiatric/Behavioral: Negative.  Negative for altered mental status and depression. The patient is not nervous/anxious.    Allergic/Immunologic: Negative.  Negative for hives.                   Objective:        General: Kalrissa is well-developed, well-nourished, appears stated age, in no acute distress, alert and oriented to time, place and person.     General    Vitals reviewed.  Constitutional: She is oriented to person, place, and time. She appears well-developed and well-nourished. No distress.   HENT:   Mouth/Throat: No oropharyngeal exudate.   Eyes: Right eye exhibits no discharge. Left eye exhibits no discharge.   Neck: Normal range of motion.   Cardiovascular: Normal rate and regular rhythm.    Pulmonary/Chest: Effort normal and breath sounds normal. No respiratory distress.   Neurological: She is alert and oriented to person, place, and time. She has normal reflexes. No cranial nerve deficit. Coordination normal.   Psychiatric: She has a normal mood and affect. Her behavior is normal. Judgment and thought content normal.     General Musculoskeletal Exam   Gait: normal       Right Knee Exam     Inspection   Erythema: absent  Scars: present  Swelling: absent  Effusion: absent  Deformity: absent  Bruising: absent    Tenderness   The patient is tender to palpation of the patella and patellar tendon.    Range of Motion   Extension: 0   Flexion:  140 abnormal     Tests   Meniscus   Jennifer:  Medial - negative Lateral - negative  Ligament Examination Lachman: normal (-1 to 2mm) PCL-Posterior Drawer: normal (0 to 2mm)     MCL - Valgus: normal (0 to 2mm)  LCL - Varus: normalPivot Shift: normal (Equal)Reverse Pivot Shift: normal (Equal)Dial Test at 30 degrees: normal (< 5 degrees)Dial Test at 90 degrees: normal (< 5 degrees)  Posterior Sag Test: negative  Posterolateral Corner:  unstable (>15 degrees difference)  Patella   Patellar apprehension: negative  Passive Patellar Tilt: neutral  Patellar Tracking: normal  Patellar Glide (quadrants): Lateral - 1   Medial - 2  Q-Angle at 90 degrees: normal  Patellar Grind: negative  J-Sign: none    Other   Meniscal Cyst: absent  Popliteal (Baker's) Cyst: absent  Sensation: normal    Comments:  Portal sutures removed. No signs of infection or necrosis. No purulent drainage. NVI.     Left Knee Exam     Inspection   Erythema: absent  Scars: absent  Swelling: absent  Effusion: absent  Deformity: absent  Bruising: absent    Tenderness   The patient is experiencing no tenderness.     Range of Motion   Extension: 0   Flexion: 140     Tests   Meniscus   Jennifer:  Medial - negative Lateral - negative  Stability Lachman: normal (-1 to 2mm) PCL-Posterior Drawer: normal (0 to 2mm)  MCL - Valgus: normal (0 to 2mm)  LCL - Varus: normal (0 to 2mm)Pivot Shift: normal (Equal)Reverse Pivot Shift: normal (Equal)Dial Test at 30 degrees: normal (< 5 degrees)Dial Test at 90 degrees: normal (< 5 degrees)  Posterior Sag Test: negative  Posterolateral Corner: unstable (>15 degrees difference)  Patella   Patellar apprehension: negative  Passive Patellar Tilt: neutral  Patellar Tracking: normal  Patellar Glide (Quadrants): Lateral - 1 Medial - 2  Q-Angle at 90 degrees: normal  Patellar Grind: negative  J-Sign: J sign absent    Other   Meniscal Cyst: absent  Popliteal (Baker's) Cyst: absent  Sensation: normal    Right Hip Exam     Tests   Eloisa: negative  Left Hip Exam     Tests   Eloisa: negative          Muscle Strength   Right Lower Extremity   Hip Abduction: 5/5   Quadriceps:  4/5   Hamstrin/5   Left Lower Extremity   Hip Abduction: 5/5   Quadriceps:  5/5   Hamstrin/5     Reflexes     Left Side  Quadriceps:  2+  Achilles:  2+    Right Side   Quadriceps:  2+  Achilles:  2+    Vascular Exam     Right Pulses  Dorsalis Pedis:      2+  Posterior Tibial:       2+        Left Pulses  Dorsalis Pedis:      2+  Posterior Tibial:      2+          RADIOGRAPHS:  Right knee:  FINDINGS:  Two views: There is ACL repair.  No acute fracture dislocation bone destruction or complication seen.        Assessment:       Encounter Diagnoses   Name Primary?    Acute pain of right knee Yes    Muscle weakness of lower extremity     Chronic pain of right knee           Plan:       1. IKDC, SF-12 and KOOS was filled out today in clinic.     RTC in 3 months with Dr. Lázaro Cody Patient will fill out IKDC, SF-12 and KOOS and bilateral knee series on return.      2. PT orders placed today- Sharan Short, PT    3. Celebrex 200mg BID- sent to pharmacy    4.  May swim  Start CORE program  Exercycle 30-45 min.                      Patient questionnaires may have been collected.

## 2020-03-11 NOTE — PROGRESS NOTES
Care Everywhere: updated  Immunization: updated  Health Maintenance: updated  Media Review: n/a  Legacy Review: n/a  Order placed: n/a  Upcoming appts:n/a

## 2020-03-25 ENCOUNTER — PATIENT OUTREACH (OUTPATIENT)
Dept: ADMINISTRATIVE | Facility: OTHER | Age: 35
End: 2020-03-25

## 2020-03-25 ENCOUNTER — ROUTINE PRENATAL (OUTPATIENT)
Dept: OBSTETRICS AND GYNECOLOGY | Facility: CLINIC | Age: 35
End: 2020-03-25
Payer: COMMERCIAL

## 2020-03-25 ENCOUNTER — LAB VISIT (OUTPATIENT)
Dept: LAB | Facility: OTHER | Age: 35
End: 2020-03-25
Attending: ADVANCED PRACTICE MIDWIFE
Payer: COMMERCIAL

## 2020-03-25 VITALS — SYSTOLIC BLOOD PRESSURE: 124 MMHG | BODY MASS INDEX: 29.94 KG/M2 | DIASTOLIC BLOOD PRESSURE: 72 MMHG | WEIGHT: 194 LBS

## 2020-03-25 DIAGNOSIS — Z34.93 PREGNANT AND NOT YET DELIVERED IN THIRD TRIMESTER: Primary | ICD-10-CM

## 2020-03-25 DIAGNOSIS — Z34.92 PRENATAL CARE IN SECOND TRIMESTER: ICD-10-CM

## 2020-03-25 PROCEDURE — 99999 PR PBB SHADOW E&M-EST. PATIENT-LVL II: CPT | Mod: PBBFAC,,, | Performed by: ADVANCED PRACTICE MIDWIFE

## 2020-03-25 PROCEDURE — 36415 COLL VENOUS BLD VENIPUNCTURE: CPT

## 2020-03-25 PROCEDURE — 82105 ALPHA-FETOPROTEIN SERUM: CPT

## 2020-03-25 PROCEDURE — 99999 PR PBB SHADOW E&M-EST. PATIENT-LVL II: ICD-10-PCS | Mod: PBBFAC,,, | Performed by: ADVANCED PRACTICE MIDWIFE

## 2020-03-25 NOTE — PROGRESS NOTES
"Chief Complaint   Patient presents with    Routine Prenatal Visit       35 y.o., at 19w4d by Estimated Date of Delivery: 8/15/20  Doing well.  TW lbs    ROS  OBSTETRICS:   Contractions No   Bleeding No   Loss of fluid No   Fetal mvmnt +  GASTRO:   Nausea No   Vomiting No      OB History    Para Term  AB Living   1 0 0 0 0 0   SAB TAB Ectopic Multiple Live Births   0 0 0 0        # Outcome Date GA Lbr Nathaniel/2nd Weight Sex Delivery Anes PTL Lv   1 Current                Dating reviewed  Allergies and problem list reviewed and updated  Medical and surgical history reviewed  Prenatal labs reviewed and updated    PHYSICAL EXAM  /72   Wt 88 kg (194 lb 0.1 oz)   LMP 11/10/2019 (Exact Date)   BMI 29.94 kg/m²     GENERAL: No acute distress  HEENT: Normocephalic, atraumatic  NEURO: Alert and oriented x3  PSYCH: Normal mood and affect  PULMONARY: Non-labored respiration; no tachypnea  ABD: Soft, gravid, nontender; no hernia or hepatosplenomegaly  FH = umbilicus    ASSESSMENT AND PLAN    OLGA BURRIS Problems (from 20 to present)     Problem Noted Resolved    Pregnant 2020 by Nani Webster CNM No    Overview Addendum 2020  2:20 PM by Nani Webster CNM     Prepregnancy BMI: 27 (ABC max wt: 38 lb)  Pap: Patient reports most recent pap smear: 2019, Results: ASCUS with positive "other" HRHPV. Colposcopy with LEEP done: CIN2. Repeat cotesting at postpartum follow-up visit.  Dating - By 8wk US  U/S -   Aneuploidy screening - Had MT21 done, awaiting results. Considering AFP.  Blood type: AB POS.  GDM screen -   Vaccines - [ ]Flu [ ]TDAP  GBS  [ ]Consents  Contraception -  Peds -   Circ -          Birth Center Risk Assessment: 0- Meets birth center guidelines 2020 by Nani Webster CNM No    Overview Signed 2020  2:21 PM by Nani Webster CNM     Birth Center Risk Assessment: 0- Meets birth center guidelines    0- CNM management in ABC  1- CNM management " on L&D  2- Consultation with OB to develop  plan of care  3- Collaborative CNM/OB management with delivery on L&D  4- Permanent referral of care to MD                   Reviewed anatomy ultrasound - scheduled next week.   Pt having AFP drawn today.   Encouraged prenatal education classes - info to [facility] virtual classes as well as ours shared.   Education regarding  labor warning s/s.  Confirmed after-hours number given to patient.    Reviewed warning signs, normal FM, and how/when to call.    Follow-up: 4 weeks, call or present sooner PRN    Birth Center Risk Assessment: 0- Meets birth center guidelines    5- CNM management in ABC  6- CNM management on L&D  7- Consultation with OB to develop  plan of care  8- Collaborative CNM/OB management with delivery on L&D  9- Permanent referral of care to MD

## 2020-03-30 LAB
# FETUSES US: NORMAL
AFP INTERPRETATION: NORMAL
AFP MOM SERPL: 1.04
AFP SERPL-MCNC: 50 NG/ML
AFP SERPL-MCNC: NEGATIVE NG/ML
AGE AT DELIVERY: 36
GA (DAYS): 4 D
GA (WEEKS): 19 WK
GESTATIONAL AGE METHOD: NORMAL
IDDM PATIENT QL: NORMAL
SMOKING STATUS FTND: NO

## 2020-03-31 ENCOUNTER — PROCEDURE VISIT (OUTPATIENT)
Dept: MATERNAL FETAL MEDICINE | Facility: CLINIC | Age: 35
End: 2020-03-31
Payer: COMMERCIAL

## 2020-03-31 DIAGNOSIS — O09.512 ADVANCED MATERNAL AGE, 1ST PREGNANCY, SECOND TRIMESTER: ICD-10-CM

## 2020-03-31 DIAGNOSIS — Z34.92 PRENATAL CARE IN SECOND TRIMESTER: ICD-10-CM

## 2020-03-31 DIAGNOSIS — Z36.89 ENCOUNTER FOR ULTRASOUND TO ASSESS FETAL GROWTH: Primary | ICD-10-CM

## 2020-03-31 PROCEDURE — 76811 OB US DETAILED SNGL FETUS: CPT | Mod: S$GLB,,, | Performed by: OBSTETRICS & GYNECOLOGY

## 2020-03-31 PROCEDURE — 76811 PR US, OB FETAL EVAL & EXAM, TRANSABDOM,FIRST GESTATION: ICD-10-PCS | Mod: S$GLB,,, | Performed by: OBSTETRICS & GYNECOLOGY

## 2020-04-03 ENCOUNTER — PATIENT MESSAGE (OUTPATIENT)
Dept: OBSTETRICS AND GYNECOLOGY | Facility: CLINIC | Age: 35
End: 2020-04-03

## 2020-04-09 ENCOUNTER — TELEPHONE (OUTPATIENT)
Dept: REHABILITATION | Facility: HOSPITAL | Age: 35
End: 2020-04-09

## 2020-04-16 ENCOUNTER — CLINICAL SUPPORT (OUTPATIENT)
Dept: REHABILITATION | Facility: HOSPITAL | Age: 35
End: 2020-04-16
Attending: ORTHOPAEDIC SURGERY
Payer: COMMERCIAL

## 2020-04-16 DIAGNOSIS — M25.561 ACUTE PAIN OF RIGHT KNEE: ICD-10-CM

## 2020-04-16 DIAGNOSIS — M62.81 MUSCLE WEAKNESS OF LOWER EXTREMITY: ICD-10-CM

## 2020-04-16 DIAGNOSIS — G89.29 CHRONIC PAIN OF RIGHT KNEE: ICD-10-CM

## 2020-04-16 DIAGNOSIS — M25.561 CHRONIC PAIN OF RIGHT KNEE: ICD-10-CM

## 2020-04-16 DIAGNOSIS — M25.661 DECREASED RANGE OF MOTION (ROM) OF RIGHT KNEE: ICD-10-CM

## 2020-04-16 PROCEDURE — 97110 THERAPEUTIC EXERCISES: CPT | Mod: 95 | Performed by: PHYSICAL THERAPIST

## 2020-04-16 NOTE — PROGRESS NOTES
Physical Therapy Daily Treatment Note via Telehealth      Visit Date: 4/16/2020     Patient unsafe for in-person office visit due to high risk co-morbidities, probability of infection, to minimize exposure, due to pt expressing symptoms, and/or due to government mandated quarantine.  This patient: (1) was informed that telehealth visits are now available congruent with guidelines set by state practice acts & CMS; (2) initiated scheduling of this type of visit; and (3) gave verbal consent to participate in such.  The patient & provider used Epic [facility] using both video & audio synchronous services to complete the visit.        Patient Location: home   Visit type: Virtual visit with synchronous audio and video through ConnectTo[facility]    Time Connection Initiated: 12:30  Time Connection Terminated: 13:00  Total Billable Time: 30 minutes    Name: Klarissa Brown  Clinic Number: [id number]    Therapy Diagnosis:   Encounter Diagnoses   Name Primary?    Acute pain of right knee     Muscle weakness of lower extremity     Chronic pain of right knee     Decreased range of motion (ROM) of right knee      Physician: Lázaro Cody MD Dr Jones     PROCEDURES(S) PERFORMED:   1. Right  Arthroscopy, anterior cruciate ligament reconstruction 99738  2.  Right  Arthroscopy, knee, synovectomy, limited 53967    GRAFT SOURCE:  Hamstring auto graft     DATE OF PROCEDURE: 10/10/2019     Physician Orders: PT Eval and Treat   Medical Diagnosis: Z98.890 (ICD-10-CM) - S/P ACL reconstruction  Evaluation Date: 11/14/2019  Authorization Period Expiration: 12/31/2019  Plan of Care Certification Period: 8/14/2020  Visit # / Visits authorized:  9/   In 2020  (15 total)     Precautions: Standard       Subjective      Pt reports that overall her R knee is improving, has been doing spin class x 30' on bike  every other day with good results, has not had anterior knee pain (PFPS or patellar tendon) symptoms and has been walking x 1.5 miles  "for exercise    she was compliant with home exercise program given last session.   Response to previous treatment:good   Functional change: see subjective above    Pain: 010 at rest and with level surface ambulation   Location: right knee  Patellar tendon region     Objective     PO approx 6 months post op     Measurements taken: visually observed self ROM k' flex full    (Unable to perform single leg squat)   decreased  ()+) TTP over patellar tendon   (PROM:  2/0/130 (prone) vs 2/0/144 on L )    Guilherme reviewed therapeutic exercises to develop strength, endurance, ROM and flexibility for 30 minutes  including:    3D Hams with physioball  3x10   Single leg squat 3x10-15 or 3xburn  LSU increased to 5-6" step 3x10  Wall sit bias to R 5x:30 progress to single leg wall sit at 30 deg angle   Split squat 3x10   U HR 3xburn   SLS on foam 5x;15 EO       Home Exercises Provided and Patient Education Provided     Education provided:   -  None this visit     Written Home Exercises Provided:   Exercises were reviewed and Guilherme was able to demonstrate them prior to the end of the session.  Guilherme demonstrated good  understanding of the education provided.    See above for exercises provided for this visit  4/16/2020      Assessment     michael Rx without increase in sxs,  Pt completely understood all exercises added to HEP     Guilherme is progressing well towards her goals.   Prognosis is good .     Pt will continue to benefit from skilled outpatient physical therapy to address the deficits listed in the problem list box on initial evaluation, provide pt/family education and to maximize pt's level of independence in the home and community environment.     Pt's spiritual, cultural and educational needs considered and pt agreeable to plan of care and goals.    Anticipated barriers to physical therapy: none    Goals:     Short-Term Goals: 6  Weeks  (ALL MET)  - The patient will be independent with initial home exercise program. (MET)  - The " patient is independent with donning/doffing brace to protect tissue healing. (MET)  - The patient will be independent amb with crutches on level tile for household distances.(MET)  - The patient will increase ROM by 15 degrees to perform ambulation and bathing and hygiene with pain < 0/10 (MET)  - The patient will increase strength by 1/2 muscle grade to perform ambulation and bathing and hygiene with pain < 0/10  (MET).    Long-Term Goals: 36 weeks  - The patient will be independent with home exercise program and symptom management.  - The patient will be independent amb with no assistive device on all surfaces for community distances.  - The patient will increase ROM = to uninvolved knee to perform ambulation, toileting, dressing and recreation/leisure activities  with pain < 0/10.  - The patient will increase strength = to uninvolved knee  to perform ambulation, toileting, dressing and recreation/leisure activities   with pain < 0/10.      Plan     All questions answered and pt to follow up via telehealth in two weeks     Continue with established Plan of Care towards PT goals with focus on decreasing pain, increasing ROM, strength, neuromuscular control and functional status       Sharan Short, PT

## 2020-04-21 ENCOUNTER — PATIENT MESSAGE (OUTPATIENT)
Dept: ADMINISTRATIVE | Facility: OTHER | Age: 35
End: 2020-04-21

## 2020-04-21 ENCOUNTER — PATIENT OUTREACH (OUTPATIENT)
Dept: ADMINISTRATIVE | Facility: OTHER | Age: 35
End: 2020-04-21

## 2020-04-21 ENCOUNTER — ROUTINE PRENATAL (OUTPATIENT)
Dept: OBSTETRICS AND GYNECOLOGY | Facility: CLINIC | Age: 35
End: 2020-04-21
Payer: COMMERCIAL

## 2020-04-21 VITALS — WEIGHT: 197.63 LBS | BODY MASS INDEX: 30.5 KG/M2 | DIASTOLIC BLOOD PRESSURE: 70 MMHG | SYSTOLIC BLOOD PRESSURE: 120 MMHG

## 2020-04-21 DIAGNOSIS — Z34.92 PREGNANT AND NOT YET DELIVERED IN SECOND TRIMESTER: Primary | ICD-10-CM

## 2020-04-21 PROCEDURE — 99999 PR PBB SHADOW E&M-EST. PATIENT-LVL II: CPT | Mod: PBBFAC,,, | Performed by: ADVANCED PRACTICE MIDWIFE

## 2020-04-21 PROCEDURE — 99999 PR PBB SHADOW E&M-EST. PATIENT-LVL II: ICD-10-PCS | Mod: PBBFAC,,, | Performed by: ADVANCED PRACTICE MIDWIFE

## 2020-04-21 PROCEDURE — 59425 ANTEPARTUM CARE ONLY: CPT | Mod: S$GLB,,, | Performed by: ADVANCED PRACTICE MIDWIFE

## 2020-04-21 PROCEDURE — 59425 PR ANTEPARTUM CARE ONLY, 4-6 VISITS: ICD-10-PCS | Mod: S$GLB,,, | Performed by: ADVANCED PRACTICE MIDWIFE

## 2020-04-21 NOTE — PROGRESS NOTES
"Chief Complaint   Patient presents with    Routine Prenatal Visit       35 y.o. female  at 23w3d, by Estimated Date of Delivery: 8/15/20  Doing well today.   Reviewed TW lbs    ROS  OBSTETRICS:   Contractions No   Bleeding No   Loss of fluid No   Fetal mvmnt +  GASTRO:   Nausea No   Vomiting No      OB History    Para Term  AB Living   1 0 0 0 0 0   SAB TAB Ectopic Multiple Live Births   0 0 0 0        # Outcome Date GA Lbr Nathaniel/2nd Weight Sex Delivery Anes PTL Lv   1 Current                Dating reviewed  Allergies and problem list reviewed and updated  Medical and surgical history reviewed  Prenatal labs reviewed and updated    PHYSICAL EXAM  /70   Wt 89.7 kg (197 lb 10.3 oz)   LMP 11/10/2019 (Exact Date)   BMI 30.50 kg/m²     GENERAL: No acute distress  HEENT: Normocephalic, atraumatic  NEURO: Alert and oriented x3  PSYCH: Normal mood and affect  PULMONARY: Non-labored respiration; no tachypnea  ABD: Soft, gravid, nontender      ASSESSMENT AND PLAN    OLGA BURRIS Problems (from 20 to present)     Problem Noted Resolved    Pregnant 2020 by Nani Webster CNM No    Overview Addendum 2020  2:20 PM by Nani Webster CNM     Prepregnancy BMI: 27 (ABC max wt: 38 lb)  Pap: Patient reports most recent pap smear: 2019, Results: ASCUS with positive "other" HRHPV. Colposcopy with LEEP done: CIN2. Repeat cotesting at postpartum follow-up visit.  Dating - By 8wk US  U/S -   Aneuploidy screening - Had MT21 done, awaiting results. Considering AFP.  Blood type: AB POS.  GDM screen -   Vaccines - [ ]Flu [ ]TDAP  GBS  [ ]Consents  Contraception -  Peds -   Circ -          Birth Center Risk Assessment: 0- Meets birth center guidelines 2020 by Nani Webster CNM No    Overview Signed 2020  2:21 PM by Nani Webster CNM     Birth Center Risk Assessment: 0- Meets birth center guidelines    0- CNM management in ABC  1- CNM management on " L&D  2- Consultation with OB to develop  plan of care  3- Collaborative CNM/OB management with delivery on L&D  4- Permanent referral of care to MD                   Reviewed upcoming 28wk labs, (AB POS) and orders placed  Education regarding warning signs of PreEclampsia, reviewed normal FM,  labor precautions, and how/when to call.    Follow-up: 4 weeks, call or present sooner PRN    Birth Center Risk Assessment: 0- Meets birth center guidelines    5- CNM management in ABC  6- CNM management on L&D  7- Consultation with OB to develop  plan of care  8- Collaborative CNM/OB management with delivery on L&D  Permanent referral of care to MD

## 2020-04-21 NOTE — LETTER
April 21, 2020    Klarissa Brown  [address] [place] [address]              [facility] [address]  [address] [place] [address]  Phone: [phone]  Fax: [phone]    To Whom It May Concern:    Ms. Brown is currently under our care for pregnancy.  Estimated Date of Delivery:8/15/2020        Sincerely,        Sophy Bee CNM

## 2020-04-30 ENCOUNTER — CLINICAL SUPPORT (OUTPATIENT)
Dept: REHABILITATION | Facility: HOSPITAL | Age: 35
End: 2020-04-30
Attending: ORTHOPAEDIC SURGERY
Payer: COMMERCIAL

## 2020-04-30 DIAGNOSIS — G89.29 CHRONIC PAIN OF RIGHT KNEE: Primary | ICD-10-CM

## 2020-04-30 DIAGNOSIS — M25.661 DECREASED RANGE OF MOTION (ROM) OF RIGHT KNEE: ICD-10-CM

## 2020-04-30 DIAGNOSIS — M25.561 CHRONIC PAIN OF RIGHT KNEE: Primary | ICD-10-CM

## 2020-04-30 DIAGNOSIS — M62.81 MUSCLE WEAKNESS OF LOWER EXTREMITY: ICD-10-CM

## 2020-04-30 PROCEDURE — 97110 THERAPEUTIC EXERCISES: CPT | Mod: 95 | Performed by: PHYSICAL THERAPIST

## 2020-04-30 NOTE — PROGRESS NOTES
Physical Therapy Daily Treatment Note via Telehealth      Visit Date: 4/30/2020     Patient unsafe for in-person office visit due to high risk co-morbidities, probability of infection, to minimize exposure, due to pt expressing symptoms, and/or due to government mandated quarantine.  This patient: (1) was informed that telehealth visits are now available congruent with guidelines set by state practice acts & CMS; (2) initiated scheduling of this type of visit; and (3) gave verbal consent to participate in such.  The patient & provider used Epic Vaughn using both video & audio synchronous services to complete the visit.        Patient Location: home   Visit type: Virtual visit with synchronous audio and video through [employer]    Time Connection Initiated: 14:00  Time Connection Terminated: 14:45  Total Billable Time: 45 minutes    Name: Klarissa Bentley [facility] Number: [id number]    Therapy Diagnosis:   No diagnosis found.  Physician: Lázaro Cody MD Dr Jones     PROCEDURES(S) PERFORMED:   1. Right  Arthroscopy, anterior cruciate ligament reconstruction 09360  2.  Right  Arthroscopy, knee, synovectomy, limited 01729    GRAFT SOURCE:  Hamstring auto graft     DATE OF PROCEDURE: 10/10/2019     Physician Orders: PT Eval and Treat   Medical Diagnosis: Z98.890 (ICD-10-CM) - S/P ACL reconstruction  Evaluation Date: 11/14/2019  Authorization Period Expiration: 12/31/2019  Plan of Care Certification Period: 8/14/2020  Visit # / Visits authorized:  10/   In 2020  (16 total)     Precautions: Standard       Subjective      Pt reports doing well with stationary biking and walking for exercise  No reports of anterior knee pain     she was compliant with home exercise program given last session.   Response to previous treatment:good   Functional change: see subjective above    Pain: 010 at rest and with level surface ambulation   Location: right knee  Patellar tendon region     Objective     PO approx 6.5 months post op      Measurements taken: visually observed self ROM k' flex full    (Unable to perform single leg squat)   decreased  ()+) TTP over patellar tendon   (PROM:  2/0/130 (prone) vs 2/0/144 on L )    Guilherme reviewed therapeutic exercises to develop strength, endurance, ROM and flexibility for 45 minutes  including:    seated Ham curl with TB 3x15    Single leg wall sit 5x:20 30 deg angle   Squats + pulse 3xburn   Lunges 3x10     Balance program  Ground touch 2x5 k' bent  R/L  Ground touch 2x5 k' straight R/L   y excursion PM/PL 2x5 R/L  SLS soccer pass/volley 2x5    Pt to contact OB regarding attempting to walk/jog :30/ :30 5 cycles   And getting off seat to do stationary bike for spin class     Home Exercises Provided and Patient Education Provided     Education provided:   -  None this visit     Written Home Exercises Provided:   Exercises were reviewed and Guilherme was able to demonstrate them prior to the end of the session.  Guilherme demonstrated good  understanding of the education provided.    See above for exercises provided for this visit  4/16/2020      Assessment     michael Rx without increase in sxs,  Pt completely understood all exercises added to HEP     Guilherme is progressing well towards her goals.   Prognosis is good .     Pt will continue to benefit from skilled outpatient physical therapy to address the deficits listed in the problem list box on initial evaluation, provide pt/family education and to maximize pt's level of independence in the home and community environment.     Pt's spiritual, cultural and educational needs considered and pt agreeable to plan of care and goals.    Anticipated barriers to physical therapy: none    Goals:     Short-Term Goals: 6  Weeks  (ALL MET)  - The patient will be independent with initial home exercise program. (MET)  - The patient is independent with donning/doffing brace to protect tissue healing. (MET)  - The patient will be independent amb with crutches on level tile for  household distances.(MET)  - The patient will increase ROM by 15 degrees to perform ambulation and bathing and hygiene with pain < 0/10 (MET)  - The patient will increase strength by 1/2 muscle grade to perform ambulation and bathing and hygiene with pain < 0/10  (MET).    Long-Term Goals: 36 weeks  - The patient will be independent with home exercise program and symptom management.  - The patient will be independent amb with no assistive device on all surfaces for community distances.  - The patient will increase ROM = to uninvolved knee to perform ambulation, toileting, dressing and recreation/leisure activities  with pain < 0/10.  - The patient will increase strength = to uninvolved knee  to perform ambulation, toileting, dressing and recreation/leisure activities   with pain < 0/10.      Plan     All questions answered and pt to follow up via telehealth in two weeks     Continue with established Plan of Care towards PT goals with focus on decreasing pain, increasing ROM, strength, neuromuscular control and functional status       Sharan Short, PT

## 2020-05-04 ENCOUNTER — PATIENT MESSAGE (OUTPATIENT)
Dept: OBSTETRICS AND GYNECOLOGY | Facility: CLINIC | Age: 35
End: 2020-05-04

## 2020-05-04 PROBLEM — Z13.9 RISK AND FUNCTIONAL ASSESSMENT: Status: RESOLVED | Noted: 2020-02-02 | Resolved: 2020-05-04

## 2020-05-12 ENCOUNTER — PATIENT MESSAGE (OUTPATIENT)
Dept: OBSTETRICS AND GYNECOLOGY | Facility: CLINIC | Age: 35
End: 2020-05-12

## 2020-05-14 ENCOUNTER — CLINICAL SUPPORT (OUTPATIENT)
Dept: REHABILITATION | Facility: HOSPITAL | Age: 35
End: 2020-05-14
Attending: ORTHOPAEDIC SURGERY
Payer: COMMERCIAL

## 2020-05-14 DIAGNOSIS — M25.561 CHRONIC PAIN OF RIGHT KNEE: Primary | ICD-10-CM

## 2020-05-14 DIAGNOSIS — M25.661 DECREASED RANGE OF MOTION (ROM) OF RIGHT KNEE: ICD-10-CM

## 2020-05-14 DIAGNOSIS — M62.81 MUSCLE WEAKNESS OF LOWER EXTREMITY: ICD-10-CM

## 2020-05-14 DIAGNOSIS — G89.29 CHRONIC PAIN OF RIGHT KNEE: Primary | ICD-10-CM

## 2020-05-14 PROCEDURE — 97110 THERAPEUTIC EXERCISES: CPT | Mod: 95 | Performed by: PHYSICAL THERAPIST

## 2020-05-14 NOTE — PROGRESS NOTES
"  Physical Therapy Daily Treatment Note via Telehealth      Visit Date: 5/14/2020     Patient unsafe for in-person office visit due to high risk co-morbidities, probability of infection, to minimize exposure, due to pt expressing symptoms, and/or due to government mandated quarantine.  This patient: (1) was informed that telehealth visits are now available congruent with guidelines set by state practice acts & CMS; (2) initiated scheduling of this type of visit; and (3) gave verbal consent to participate in such.  The patient & provider used Epic Vaughn using both video & audio synchronous services to complete the visit.    Patient Location: home   Visit type: Virtual visit with synchronous audio and video through [employer]    Time Connection Initiated: 11:00  Time Connection Terminated: 11:45  Total Billable Time: 45 minutes    Name: Klarissa Bentley [facility] Number: [id number]    Therapy Diagnosis:   Muscle weakness lower extremity     Physician: Lázaro Cody MD Dr Jones     PROCEDURES(S) PERFORMED:   1. Right  Arthroscopy, anterior cruciate ligament reconstruction 45037  2.  Right  Arthroscopy, knee, synovectomy, limited 29243    GRAFT SOURCE:  Hamstring auto graft     DATE OF PROCEDURE: 10/10/2019     Physician Orders: PT Eval and Treat   Medical Diagnosis: Z98.890 (ICD-10-CM) - S/P ACL reconstruction  Evaluation Date: 11/14/2019  Authorization Period Expiration: 12/31/2019  Plan of Care Certification Period: 8/14/2020  Visit # / Visits authorized:  11   In 2020  (17 total)     Precautions: Standard       Subjective      Pt reports difficulty with Robyn provided at last VV but no reports of anterior knee pain   "It feels weak"     she was compliant with home exercise program given last session.   Response to previous treatment:good   Functional change: "I can walk x 2 miles no problem"     Pain: 010 at rest and with level surface ambulation   Location: right knee     Objective     PO approx 7 months post op " "    Measurements taken: visually observed difficulty with LSU, lunge an balance   ( self ROM k' flex full)    (Unable to perform single leg squat)   decreased  ()+) TTP over patellar tendon   (PROM:  2/0/130 (prone) vs 2/0/144 on L )    Guilherme reviewed therapeutic exercises to develop strength, endurance, ROM and flexibility for 45 minutes  including:     wall sit bias to R 5x:30 60 deg angle   Squats + pulse 3xburn   Lunges 3x10 1/2 range   FSD 3" 3x10     Balance program  Ground touch 2x5 k' bent  R/L  Ground touch 2x5 k' straight R/L   y excursion PM/PL 2x5 R/L    And getting off seat to do stationary bike for spin class     Home Exercises Provided and Patient Education Provided     Education provided:   -  None this visit     Written Home Exercises Provided:   Exercises were reviewed and Guilherme was able to demonstrate them prior to the end of the session.  Guilherme demonstrated good  understanding of the education provided.    See above for exercises provided for this visit  5/14/2020      Assessment     michael Rx without increase in sxs,  Pt completely understood all exercises added to HEP   Quad muscle weakness and decreased balance persist affecting ADL performance     Guilherme is progressing well towards her goals.   Prognosis is good .     Pt will continue to benefit from skilled outpatient physical therapy to address the deficits listed in the problem list box on initial evaluation, provide pt/family education and to maximize pt's level of independence in the home and community environment.     Pt's spiritual, cultural and educational needs considered and pt agreeable to plan of care and goals.    Anticipated barriers to physical therapy: none    Goals:     Short-Term Goals: 6  Weeks  (ALL MET)  - The patient will be independent with initial home exercise program. (MET)  - The patient is independent with donning/doffing brace to protect tissue healing. (MET)  - The patient will be independent amb with crutches on " level tile for household distances.(MET)  - The patient will increase ROM by 15 degrees to perform ambulation and bathing and hygiene with pain < 0/10 (MET)  - The patient will increase strength by 1/2 muscle grade to perform ambulation and bathing and hygiene with pain < 0/10  (MET).    Long-Term Goals: 36 weeks  - The patient will be independent with home exercise program and symptom management.  - The patient will be independent amb with no assistive device on all surfaces for community distances.  - The patient will increase ROM = to uninvolved knee to perform ambulation, toileting, dressing and recreation/leisure activities  with pain < 0/10.  - The patient will increase strength = to uninvolved knee  to perform ambulation, toileting, dressing and recreation/leisure activities   with pain < 0/10.      Plan     All questions answered and pt to follow up via telehealth in two weeks     Continue with established Plan of Care towards PT goals with focus on decreasing pain, increasing ROM, strength, neuromuscular control and functional status       Sharan Short, PT

## 2020-05-18 ENCOUNTER — PATIENT OUTREACH (OUTPATIENT)
Dept: ADMINISTRATIVE | Facility: OTHER | Age: 35
End: 2020-05-18

## 2020-05-19 ENCOUNTER — LAB VISIT (OUTPATIENT)
Dept: LAB | Facility: OTHER | Age: 35
End: 2020-05-19
Attending: ADVANCED PRACTICE MIDWIFE
Payer: COMMERCIAL

## 2020-05-19 ENCOUNTER — ROUTINE PRENATAL (OUTPATIENT)
Dept: OBSTETRICS AND GYNECOLOGY | Facility: CLINIC | Age: 35
End: 2020-05-19
Payer: COMMERCIAL

## 2020-05-19 ENCOUNTER — CLINICAL SUPPORT (OUTPATIENT)
Dept: OBSTETRICS AND GYNECOLOGY | Facility: CLINIC | Age: 35
End: 2020-05-19
Payer: COMMERCIAL

## 2020-05-19 VITALS — SYSTOLIC BLOOD PRESSURE: 118 MMHG | DIASTOLIC BLOOD PRESSURE: 72 MMHG | WEIGHT: 199.63 LBS | BODY MASS INDEX: 30.8 KG/M2

## 2020-05-19 DIAGNOSIS — Z34.92 PREGNANT AND NOT YET DELIVERED IN SECOND TRIMESTER: ICD-10-CM

## 2020-05-19 DIAGNOSIS — Z34.03 ENCOUNTER FOR SUPERVISION OF NORMAL FIRST PREGNANCY IN THIRD TRIMESTER: Primary | ICD-10-CM

## 2020-05-19 LAB
BASOPHILS # BLD AUTO: 0.05 K/UL (ref 0–0.2)
BASOPHILS NFR BLD: 0.4 % (ref 0–1.9)
DIFFERENTIAL METHOD: ABNORMAL
EOSINOPHIL # BLD AUTO: 0.1 K/UL (ref 0–0.5)
EOSINOPHIL NFR BLD: 0.6 % (ref 0–8)
ERYTHROCYTE [DISTWIDTH] IN BLOOD BY AUTOMATED COUNT: 12.3 % (ref 11.5–14.5)
GLUCOSE SERPL-MCNC: 82 MG/DL (ref 70–140)
HCT VFR BLD AUTO: 35.6 % (ref 37–48.5)
HGB BLD-MCNC: 11.6 G/DL (ref 12–16)
IMM GRANULOCYTES # BLD AUTO: 0.1 K/UL (ref 0–0.04)
IMM GRANULOCYTES NFR BLD AUTO: 0.9 % (ref 0–0.5)
LYMPHOCYTES # BLD AUTO: 2.1 K/UL (ref 1–4.8)
LYMPHOCYTES NFR BLD: 18.7 % (ref 18–48)
MCH RBC QN AUTO: 28.4 PG (ref 27–31)
MCHC RBC AUTO-ENTMCNC: 32.6 G/DL (ref 32–36)
MCV RBC AUTO: 87 FL (ref 82–98)
MONOCYTES # BLD AUTO: 0.6 K/UL (ref 0.3–1)
MONOCYTES NFR BLD: 5.1 % (ref 4–15)
NEUTROPHILS # BLD AUTO: 8.5 K/UL (ref 1.8–7.7)
NEUTROPHILS NFR BLD: 74.3 % (ref 38–73)
NRBC BLD-RTO: 0 /100 WBC
PLATELET # BLD AUTO: 264 K/UL (ref 150–350)
PMV BLD AUTO: 11 FL (ref 9.2–12.9)
RBC # BLD AUTO: 4.09 M/UL (ref 4–5.4)
WBC # BLD AUTO: 11.44 K/UL (ref 3.9–12.7)

## 2020-05-19 PROCEDURE — 90715 TDAP VACCINE 7 YRS/> IM: CPT | Mod: S$GLB,,, | Performed by: ADVANCED PRACTICE MIDWIFE

## 2020-05-19 PROCEDURE — 3008F PR BODY MASS INDEX (BMI) DOCUMENTED: ICD-10-PCS | Mod: CPTII,S$GLB,, | Performed by: ADVANCED PRACTICE MIDWIFE

## 2020-05-19 PROCEDURE — 82950 GLUCOSE TEST: CPT

## 2020-05-19 PROCEDURE — 99999 PR PBB SHADOW E&M-EST. PATIENT-LVL II: ICD-10-PCS | Mod: PBBFAC,,, | Performed by: ADVANCED PRACTICE MIDWIFE

## 2020-05-19 PROCEDURE — 85025 COMPLETE CBC W/AUTO DIFF WBC: CPT

## 2020-05-19 PROCEDURE — 99213 OFFICE O/P EST LOW 20 MIN: CPT | Mod: S$GLB,,, | Performed by: ADVANCED PRACTICE MIDWIFE

## 2020-05-19 PROCEDURE — 90715 TDAP VACCINE GREATER THAN OR EQUAL TO 7YO IM: ICD-10-PCS | Mod: S$GLB,,, | Performed by: ADVANCED PRACTICE MIDWIFE

## 2020-05-19 PROCEDURE — 99999 PR PBB SHADOW E&M-EST. PATIENT-LVL I: ICD-10-PCS | Mod: PBBFAC,,,

## 2020-05-19 PROCEDURE — 36415 COLL VENOUS BLD VENIPUNCTURE: CPT

## 2020-05-19 PROCEDURE — 99213 PR OFFICE/OUTPT VISIT, EST, LEVL III, 20-29 MIN: ICD-10-PCS | Mod: S$GLB,,, | Performed by: ADVANCED PRACTICE MIDWIFE

## 2020-05-19 PROCEDURE — 90471 TDAP VACCINE GREATER THAN OR EQUAL TO 7YO IM: ICD-10-PCS | Mod: S$GLB,,, | Performed by: ADVANCED PRACTICE MIDWIFE

## 2020-05-19 PROCEDURE — 99999 PR PBB SHADOW E&M-EST. PATIENT-LVL I: CPT | Mod: PBBFAC,,,

## 2020-05-19 PROCEDURE — 90471 IMMUNIZATION ADMIN: CPT | Mod: S$GLB,,, | Performed by: ADVANCED PRACTICE MIDWIFE

## 2020-05-19 PROCEDURE — 3008F BODY MASS INDEX DOCD: CPT | Mod: CPTII,S$GLB,, | Performed by: ADVANCED PRACTICE MIDWIFE

## 2020-05-19 PROCEDURE — 99999 PR PBB SHADOW E&M-EST. PATIENT-LVL II: CPT | Mod: PBBFAC,,, | Performed by: ADVANCED PRACTICE MIDWIFE

## 2020-05-19 NOTE — PROGRESS NOTES
"No chief complaint on file.  denies problems  +FM  Denies VB, LOF or ctx.  Having 28 week labs today    35 y.o. female  at 27w3d by Estimated Date of Delivery: 8/15/20    Complaints today: none  Reviewed TWlbs    ROS  OBSTETRICS:   Contractions No   Bleeding No   Loss of fluid No   Fetal mvmnt yes  GASTRO:   Nausea No   Vomiting No      OB History    Para Term  AB Living   1 0 0 0 0 0   SAB TAB Ectopic Multiple Live Births   0 0 0 0        # Outcome Date GA Lbr Nathaniel/2nd Weight Sex Delivery Anes PTL Lv   1 Current                Dating reviewed  Allergies and problem list reviewed and updated  Medical and surgical history reviewed  Prenatal labs reviewed and updated    PHYSICAL EXAM  /72   Wt 90.5 kg (199 lb 10 oz)   LMP 11/10/2019 (Exact Date)   BMI 30.80 kg/m²     GENERAL: No acute distress  HEENT: Normocephalic, atraumatic  NEURO: Alert and oriented x3  PSYCH: Normal mood and affect  PULMONARY: Non-labored respiration; no tachypnea  ABD: Soft, gravid, nontender.    ASSESSMENT AND PLAN    OLGA CANDEMichelle BURRIS Problems (from 20 to present)     Problem Noted Resolved    Pregnant 2020 by Nani Webster CNM No    Overview Addendum 2020  2:20 PM by Nani Webster CNM     Prepregnancy BMI: 27 (ABC max wt: 38 lb)  Pap: Patient reports most recent pap smear: 2019, Results: ASCUS with positive "other" HRHPV. Colposcopy with LEEP done: CIN2. Repeat cotesting at postpartum follow-up visit.  Dating - By 8wk US  U/S -   Aneuploidy screening - Had MT21 done, awaiting results. Considering AFP.  Blood type: AB POS.  GDM screen -   Vaccines - [ ]Flu [ ]TDAP  GBS  [ ]Consents  Contraception -  Peds -   Circ -          Birth Center Risk Assessment: 0- Meets birth center guidelines 2020 by Nani Webster CNM 2020 by Problem List Provider Inpatient Orders    Overview Signed 2020  2:21 PM by Nani Webster CNM     Birth Center Risk Assessment: 0- Meets " birth center guidelines    0- CNM management in ABC  1- CNM management on L&D  2- Consultation with OB to develop  plan of care  3- Collaborative CNM/OB management with delivery on L&D  4- Permanent referral of care to MD                 Completing 28wk labs today.   Blood type: (AB POS). Rhogam   Education regarding CDC recommendations for Tdap in pregnancy. Patient. Order placed, will schedule with next appt.    Reviewed warning signs, normal FKCs,  labor precautions and how/when to call.    Follow-up: 2 weeks, call or present sooner PRN.

## 2020-05-20 ENCOUNTER — PATIENT MESSAGE (OUTPATIENT)
Dept: OBSTETRICS AND GYNECOLOGY | Facility: CLINIC | Age: 35
End: 2020-05-20

## 2020-05-28 ENCOUNTER — CLINICAL SUPPORT (OUTPATIENT)
Dept: REHABILITATION | Facility: HOSPITAL | Age: 35
End: 2020-05-28
Attending: ORTHOPAEDIC SURGERY
Payer: COMMERCIAL

## 2020-05-28 DIAGNOSIS — G89.29 CHRONIC PAIN OF RIGHT KNEE: Primary | ICD-10-CM

## 2020-05-28 DIAGNOSIS — M25.561 CHRONIC PAIN OF RIGHT KNEE: Primary | ICD-10-CM

## 2020-05-28 DIAGNOSIS — M25.661 DECREASED RANGE OF MOTION (ROM) OF RIGHT KNEE: ICD-10-CM

## 2020-05-28 DIAGNOSIS — M62.81 MUSCLE WEAKNESS OF LOWER EXTREMITY: ICD-10-CM

## 2020-05-28 PROCEDURE — 97110 THERAPEUTIC EXERCISES: CPT | Mod: 95 | Performed by: PHYSICAL THERAPIST

## 2020-05-28 NOTE — PROGRESS NOTES
Physical Therapy Daily Treatment Note via Telehealth      Visit Date: 5/28/2020     Patient unsafe for in-person office visit due to high risk co-morbidities, probability of infection, to minimize exposure, due to pt expressing symptoms, and/or due to government mandated quarantine.  This patient: (1) was informed that telehealth visits are now available congruent with guidelines set by state practice acts & CMS; (2) initiated scheduling of this type of visit; and (3) gave verbal consent to participate in such.  The patient & provider used Epic Vaughn using both video & audio synchronous services to complete the visit.    Patient Location: home   Visit type: Virtual visit with synchronous audio and video through [employer]    Time Connection Initiated: 14:00  Time Connection Terminated: 14:15  Total Billable Time: 45 minutes    Name: Klarissa Brown  Clinic Number: [id number]    Therapy Diagnosis:   Muscle weakness lower extremity     Physician: Lázaro Cody MD Dr Jones     PROCEDURES(S) PERFORMED:   1. Right  Arthroscopy, anterior cruciate ligament reconstruction 27897  2.  Right  Arthroscopy, knee, synovectomy, limited 18382    GRAFT SOURCE:  Hamstring auto graft     DATE OF PROCEDURE: 10/10/2019     Physician Orders: PT Eval and Treat   Medical Diagnosis: Z98.890 (ICD-10-CM) - S/P ACL reconstruction  Evaluation Date: 11/14/2019  Authorization Period Expiration: 12/31/2019  Plan of Care Certification Period: 8/14/2020  Visit # / Visits authorized:  12   In 2020  (18 total)     Precautions: Standard       Subjective      Pt reports that she was able to get in pool and swam as well as jogged in the pool with very good results in terms of her knee     she was compliant with home exercise program given last session.   Response to previous treatment:good   Functional change: see subjective above     Pain: 010 at rest and with level surface ambulation   Location: right knee     Objective     PO approx 7.5  "months post op     Measurements taken: visually observed difficulty with LSU, lunge an balance   ( self ROM k' flex full)    (Unable to perform single leg squat)   decreased  ()+) TTP over patellar tendon   (PROM:  2/0/130 (prone) vs 2/0/144 on L )    Guilherme reviewed therapeutic exercises to develop strength, endurance, ROM and flexibility for 15 minutes  including:    rev'd    wall sit bias to R 5x:30 60 deg angle   Squats + pulse 3xburn   Lunges 3x10 1/2 range   FSD 3" 3x10     rev'd   Balance program    Aquatic program including HIT in the pool with float vest     And getting off seat to do stationary bike for spin class     Home Exercises Provided and Patient Education Provided     Education provided:   -  None this visit     Written Home Exercises Provided:   Exercises were reviewed and Guilherme was able to demonstrate them prior to the end of the session.  Guilherme demonstrated good  understanding of the education provided.    See above for exercises provided for this visit  5/14/2020      Assessment     michael Rx without increase in sxs,  Pt progressing with increased activity level and tolerance   Pt completely understood all exercises added to HEP   Quad muscle weakness and decreased balance persist affecting ADL performance     Guilherme is progressing well towards her goals.   Prognosis is good .     Pt will continue to benefit from skilled outpatient physical therapy to address the deficits listed in the problem list box on initial evaluation, provide pt/family education and to maximize pt's level of independence in the home and community environment.     Pt's spiritual, cultural and educational needs considered and pt agreeable to plan of care and goals.    Anticipated barriers to physical therapy: none    Goals:     Short-Term Goals: 6  Weeks  (ALL MET)  - The patient will be independent with initial home exercise program. (MET)  - The patient is independent with donning/doffing brace to protect tissue healing. " (MET)  - The patient will be independent amb with crutches on level tile for household distances.(MET)  - The patient will increase ROM by 15 degrees to perform ambulation and bathing and hygiene with pain < 0/10 (MET)  - The patient will increase strength by 1/2 muscle grade to perform ambulation and bathing and hygiene with pain < 0/10  (MET).    Long-Term Goals: 36 weeks  - The patient will be independent with home exercise program and symptom management.  - The patient will be independent amb with no assistive device on all surfaces for community distances.  - The patient will increase ROM = to uninvolved knee to perform ambulation, toileting, dressing and recreation/leisure activities  with pain < 0/10.  - The patient will increase strength = to uninvolved knee  to perform ambulation, toileting, dressing and recreation/leisure activities   with pain < 0/10.      Plan     All questions answered and pt to follow up via telehealth in two weeks     Continue with established Plan of Care towards PT goals with focus on decreasing pain, increasing ROM, strength, neuromuscular control and functional status       Sharan Short, PT

## 2020-06-11 ENCOUNTER — CLINICAL SUPPORT (OUTPATIENT)
Dept: REHABILITATION | Facility: HOSPITAL | Age: 35
End: 2020-06-11
Attending: ORTHOPAEDIC SURGERY
Payer: COMMERCIAL

## 2020-06-11 DIAGNOSIS — M25.561 CHRONIC PAIN OF RIGHT KNEE: Primary | ICD-10-CM

## 2020-06-11 DIAGNOSIS — M25.661 DECREASED RANGE OF MOTION (ROM) OF RIGHT KNEE: ICD-10-CM

## 2020-06-11 DIAGNOSIS — G89.29 CHRONIC PAIN OF RIGHT KNEE: Primary | ICD-10-CM

## 2020-06-11 DIAGNOSIS — M62.81 MUSCLE WEAKNESS OF LOWER EXTREMITY: ICD-10-CM

## 2020-06-11 PROCEDURE — 97110 THERAPEUTIC EXERCISES: CPT | Performed by: PHYSICAL THERAPIST

## 2020-06-11 NOTE — PROGRESS NOTES
Physical Therapy Daily Treatment Note via Telehealth      Visit Date: 6/11/2020     Patient unsafe for in-person office visit due to high risk co-morbidities, probability of infection, to minimize exposure, due to pt expressing symptoms, and/or due to government mandated quarantine.  This patient: (1) was informed that telehealth visits are now available congruent with guidelines set by state practice acts & CMS; (2) initiated scheduling of this type of visit; and (3) gave verbal consent to participate in such.  The patient & provider used Epic [employer] using both video & audio synchronous services to complete the visit.    Patient Location: home   Visit type: Virtual visit with synchronous audio and video through [employer]    Time Connection Initiated: 11:45  Time Connection Terminated: 12:15  Total Billable Time: 30 minutes    Name: Klarissa Brown  Clinic Number: [id number]    Therapy Diagnosis:   Muscle weakness lower extremity     Physician: Lázaro Cody MD Dr Jones     PROCEDURES(S) PERFORMED:   1. Right  Arthroscopy, anterior cruciate ligament reconstruction 56593  2.  Right  Arthroscopy, knee, synovectomy, limited 03063    GRAFT SOURCE:  Hamstring auto graft     DATE OF PROCEDURE: 10/10/2019     Physician Orders: PT Eval and Treat   Medical Diagnosis: Z98.890 (ICD-10-CM) - S/P ACL reconstruction  Evaluation Date: 11/14/2019  Authorization Period Expiration: 12/31/2019  Plan of Care Certification Period: 8/14/2020  Visit # / Visits authorized:  13   In 2020  (19 total)     Precautions: Standard       Subjective      Pt reports that she has been going to pool  2-3x/week with good results, only occasional R knee soreness   Swims 35 laps, jogs in pool, single leg squat in pool all with good results       she was compliant with home exercise program given last session.   Response to previous treatment:good   Functional change: see subjective above     Pain: 010 at rest and with level surface ambulation    Location: right knee     Objective     PO approx 8 months post op     Measurements taken: none this visit    ( self ROM k' flex full)    (Unable to perform single leg squat)   decreased  ()+) TTP over patellar tendon   (PROM:  2/0/130 (prone) vs 2/0/144 on L )    Guilherme reviewed therapeutic exercises to develop strength, endurance, ROM and flexibility for 15 minutes  including:    rev'd for pool:  LSU or FSD 3x10   Single leg squat 3xburn     rev'd   Use of weight machines in gym  Leg curl  Leg extension     Home Exercises Provided and Patient Education Provided     Education provided:   -  None this visit     Written Home Exercises Provided:   Exercises were reviewed and Guilherme was able to demonstrate them prior to the end of the session.  Guilherme demonstrated good  understanding of the education provided.    See above for exercises provided for this visit  6/11/2020    Assessment     michael Rx without increase in sxs,  Pt progressing with increased activity level and tolerance   Pt completely understood all exercises added to HEP   Quad muscle weakness and decreased balance persist affecting ADL performance     Guilherme is progressing well towards her goals.   Prognosis is good .     Pt will continue to benefit from skilled outpatient physical therapy to address the deficits listed in the problem list box on initial evaluation, provide pt/family education and to maximize pt's level of independence in the home and community environment.     Pt's spiritual, cultural and educational needs considered and pt agreeable to plan of care and goals.    Anticipated barriers to physical therapy: none    Goals:     Short-Term Goals: 6  Weeks  (ALL MET)  - The patient will be independent with initial home exercise program. (MET)  - The patient is independent with donning/doffing brace to protect tissue healing. (MET)  - The patient will be independent amb with crutches on level tile for household distances.(MET)  - The patient will  increase ROM by 15 degrees to perform ambulation and bathing and hygiene with pain < 0/10 (MET)  - The patient will increase strength by 1/2 muscle grade to perform ambulation and bathing and hygiene with pain < 0/10  (MET).    Long-Term Goals: 36 weeks  - The patient will be independent with home exercise program and symptom management.  - The patient will be independent amb with no assistive device on all surfaces for community distances.  - The patient will increase ROM = to uninvolved knee to perform ambulation, toileting, dressing and recreation/leisure activities  with pain < 0/10.  - The patient will increase strength = to uninvolved knee  to perform ambulation, toileting, dressing and recreation/leisure activities   with pain < 0/10.      Plan     All questions answered and pt to follow up via telehealth in two weeks     Continue with established Plan of Care towards PT goals with focus on decreasing pain, increasing ROM, strength, neuromuscular control and functional status       Sharan Short, PT

## 2020-06-19 ENCOUNTER — PROCEDURE VISIT (OUTPATIENT)
Dept: MATERNAL FETAL MEDICINE | Facility: CLINIC | Age: 35
End: 2020-06-19
Payer: COMMERCIAL

## 2020-06-19 DIAGNOSIS — Z36.89 ENCOUNTER FOR ULTRASOUND TO ASSESS FETAL GROWTH: ICD-10-CM

## 2020-06-19 PROCEDURE — 76816 OB US FOLLOW-UP PER FETUS: CPT | Mod: S$GLB,,, | Performed by: OBSTETRICS & GYNECOLOGY

## 2020-06-19 PROCEDURE — 76816 PR  US,PREGNANT UTERUS,F/U,TRANSABD APP: ICD-10-PCS | Mod: S$GLB,,, | Performed by: OBSTETRICS & GYNECOLOGY

## 2020-06-22 ENCOUNTER — OFFICE VISIT (OUTPATIENT)
Dept: SPORTS MEDICINE | Facility: CLINIC | Age: 35
End: 2020-06-22
Payer: COMMERCIAL

## 2020-06-22 VITALS
HEIGHT: 67 IN | BODY MASS INDEX: 31.55 KG/M2 | HEART RATE: 102 BPM | DIASTOLIC BLOOD PRESSURE: 86 MMHG | WEIGHT: 201 LBS | SYSTOLIC BLOOD PRESSURE: 135 MMHG

## 2020-06-22 DIAGNOSIS — M25.561 ACUTE PAIN OF RIGHT KNEE: Primary | ICD-10-CM

## 2020-06-22 DIAGNOSIS — G89.29 CHRONIC PAIN OF RIGHT KNEE: ICD-10-CM

## 2020-06-22 DIAGNOSIS — M62.81 MUSCLE WEAKNESS OF LOWER EXTREMITY: ICD-10-CM

## 2020-06-22 DIAGNOSIS — M25.561 CHRONIC PAIN OF RIGHT KNEE: ICD-10-CM

## 2020-06-22 PROCEDURE — 3008F PR BODY MASS INDEX (BMI) DOCUMENTED: ICD-10-PCS | Mod: CPTII,S$GLB,, | Performed by: ORTHOPAEDIC SURGERY

## 2020-06-22 PROCEDURE — 3008F BODY MASS INDEX DOCD: CPT | Mod: CPTII,S$GLB,, | Performed by: ORTHOPAEDIC SURGERY

## 2020-06-22 PROCEDURE — 99214 OFFICE O/P EST MOD 30 MIN: CPT | Mod: S$GLB,,, | Performed by: ORTHOPAEDIC SURGERY

## 2020-06-22 PROCEDURE — 99999 PR PBB SHADOW E&M-EST. PATIENT-LVL III: ICD-10-PCS | Mod: PBBFAC,,, | Performed by: ORTHOPAEDIC SURGERY

## 2020-06-22 PROCEDURE — 99999 PR PBB SHADOW E&M-EST. PATIENT-LVL III: CPT | Mod: PBBFAC,,, | Performed by: ORTHOPAEDIC SURGERY

## 2020-06-22 PROCEDURE — 99214 PR OFFICE/OUTPT VISIT, EST, LEVL IV, 30-39 MIN: ICD-10-PCS | Mod: S$GLB,,, | Performed by: ORTHOPAEDIC SURGERY

## 2020-06-22 NOTE — PROGRESS NOTES
Subjective:          Chief Complaint: Klarissa Brown is a 35 y.o. female who had concerns including Follow-up of the Right Knee.    Patient presents to clinic for 8 months s/p right knee ACL reconstructions. Pain today is 0/10. She has seen an improvement in patella tendon pain and discomfort since her last visits. She is progressing to faster walking on the treadmill. She is completing SL balance, stepping up, and CORE strengthening.  She is with working Sharan Short PT at [place] via [employer] every 2 weeks. She is is not currently taking Celebrex 200mg BID. She is no longer taking pain medication. Denies nausea, vomiting, fever, chills, CP, and SOB.     DATE OF PROCEDURE: 10/10/2019  ATTENDING SURGEON: Surgeon(s) and Role:     * Lázaro Cody MD - Primary     Assistants:  Hill Smith MD - Fellow  Katt Smith PA-C     PREOPERATIVE DIAGNOSIS:  Right  Anterior Cruciate Ligament Tear S83.510     POSTOPERATIVE DIAGNOSIS:   Right  Anterior Cruciate Ligament Tear S83.510     PROCEDURES(S) PERFORMED:   1. Right  Arthroscopy, anterior cruciate ligament reconstruction 15227  2.  Right  Arthroscopy, knee, synovectomy, limited 70330             Review of Systems   Constitution: Negative. Negative for chills, fever, weight gain and weight loss.   HENT: Negative.  Negative for congestion and sore throat.    Eyes: Negative.  Negative for blurred vision and double vision.   Cardiovascular: Negative.  Negative for chest pain, leg swelling and palpitations.   Respiratory: Negative.  Negative for cough and shortness of breath.    Endocrine: Negative.    Hematologic/Lymphatic: Negative.  Does not bruise/bleed easily.   Skin: Negative.  Negative for itching, poor wound healing and rash.   Musculoskeletal: Positive for joint pain, joint swelling and stiffness. Negative for back pain, muscle weakness and myalgias.   Gastrointestinal: Negative.  Negative for abdominal pain, constipation, diarrhea, nausea and vomiting.    Genitourinary: Negative.  Negative for frequency and hematuria.   Neurological: Negative.  Negative for dizziness, headaches, numbness, paresthesias and sensory change.   Psychiatric/Behavioral: Negative.  Negative for altered mental status and depression. The patient is not nervous/anxious.    Allergic/Immunologic: Negative.  Negative for hives.       Pain Related Questions  Over the past 3 days, what was your average pain during activity? (I.e. running, jogging, walking, climbing stairs, getting dressed, ect.): 0  Over the past 3 days, what was your highest pain level?: 1  Over the past 3 days, what was your lowest pain level? : 0    Other  How many nights a week are you awakened by your affected body part?: 0  Was the patient's HEIGHT measured or patient reported?: Patient Reported  Was the patient's WEIGHT measured or patient reported?: Measured      Objective:        General: Klarissa is well-developed, well-nourished, appears stated age, in no acute distress, alert and oriented to time, place and person.     General    Vitals reviewed.  Constitutional: She is oriented to person, place, and time. She appears well-developed and well-nourished. No distress.   HENT:   Mouth/Throat: No oropharyngeal exudate.   Eyes: Right eye exhibits no discharge. Left eye exhibits no discharge.   Neck: Normal range of motion.   Cardiovascular: Normal rate and regular rhythm.    Pulmonary/Chest: Effort normal and breath sounds normal. No respiratory distress.   Neurological: She is alert and oriented to person, place, and time. She has normal reflexes. No cranial nerve deficit. Coordination normal.   Psychiatric: She has a normal mood and affect. Her behavior is normal. Judgment and thought content normal.     General Musculoskeletal Exam   Gait: normal       Right Knee Exam     Inspection   Erythema: absent  Scars: present  Swelling: absent  Effusion: absent  Deformity: absent  Bruising: absent    Tenderness   The patient is  tender to palpation of the patella and patellar tendon.    Range of Motion   Extension: 0   Flexion:  140 abnormal     Tests   Meniscus   Jennifer:  Medial - negative Lateral - negative  Ligament Examination Lachman: normal (-1 to 2mm) PCL-Posterior Drawer: normal (0 to 2mm)     MCL - Valgus: normal (0 to 2mm)  LCL - Varus: normalPivot Shift: normal (Equal)Reverse Pivot Shift: normal (Equal)Dial Test at 30 degrees: normal (< 5 degrees)Dial Test at 90 degrees: normal (< 5 degrees)  Posterior Sag Test: negative  Posterolateral Corner: unstable (>15 degrees difference)  Patella   Patellar apprehension: negative  Passive Patellar Tilt: neutral  Patellar Tracking: normal  Patellar Glide (quadrants): Lateral - 1   Medial - 2  Q-Angle at 90 degrees: normal  Patellar Grind: negative  J-Sign: none    Other   Meniscal Cyst: absent  Popliteal (Baker's) Cyst: absent  Sensation: normal    Comments:  Portal sutures removed. No signs of infection or necrosis. No purulent drainage. NVI.     Left Knee Exam     Inspection   Erythema: absent  Scars: absent  Swelling: absent  Effusion: absent  Deformity: absent  Bruising: absent    Tenderness   The patient is experiencing no tenderness.     Range of Motion   Extension: 0   Flexion: 140     Tests   Meniscus   Jennifer:  Medial - negative Lateral - negative  Stability Lachman: normal (-1 to 2mm) PCL-Posterior Drawer: normal (0 to 2mm)  MCL - Valgus: normal (0 to 2mm)  LCL - Varus: normal (0 to 2mm)Pivot Shift: normal (Equal)Reverse Pivot Shift: normal (Equal)Dial Test at 30 degrees: normal (< 5 degrees)Dial Test at 90 degrees: normal (< 5 degrees)  Posterior Sag Test: negative  Posterolateral Corner: unstable (>15 degrees difference)  Patella   Patellar apprehension: negative  Passive Patellar Tilt: neutral  Patellar Tracking: normal  Patellar Glide (Quadrants): Lateral - 1 Medial - 2  Q-Angle at 90 degrees: normal  Patellar Grind: negative  J-Sign: J sign absent    Other   Meniscal  Cyst: absent  Popliteal (Baker's) Cyst: absent  Sensation: normal    Right Hip Exam     Tests   Eloisa: negative  Left Hip Exam     Tests   Eloisa: negative          Muscle Strength   Right Lower Extremity   Hip Abduction: 5/5   Quadriceps:  4/5   Hamstrin/5   Left Lower Extremity   Hip Abduction: 5/5   Quadriceps:  5/5   Hamstrin/5     Reflexes     Left Side  Quadriceps:  2+  Achilles:  2+    Right Side   Quadriceps:  2+  Achilles:  2+    Vascular Exam     Right Pulses  Dorsalis Pedis:      2+  Posterior Tibial:      2+        Left Pulses  Dorsalis Pedis:      2+  Posterior Tibial:      2+          RADIOGRAPHS:  Right knee:  FINDINGS:  Two views: There is ACL repair.  No acute fracture dislocation bone destruction or complication seen.        Assessment:       Encounter Diagnoses   Name Primary?    Acute pain of right knee Yes    Chronic pain of right knee     Muscle weakness of lower extremity           Plan:       1. IKDC, SF-12 and KOOS was filled out today in clinic.     RTC in 4-6 months with Dr. Lázaro Cody Patient will fill out IKDC, SF-12 and KOOS and bilateral knee series on return.      2. PT orders placed today- Sharan Short, PT    3.  May swim  Start CORE program  Exercycle 30-45 min.                        Patient questionnaires may have been collected.

## 2020-08-13 ENCOUNTER — CLINICAL SUPPORT (OUTPATIENT)
Dept: REHABILITATION | Facility: HOSPITAL | Age: 35
End: 2020-08-13
Attending: ORTHOPAEDIC SURGERY
Payer: COMMERCIAL

## 2020-08-13 DIAGNOSIS — M25.561 CHRONIC PAIN OF RIGHT KNEE: Primary | ICD-10-CM

## 2020-08-13 DIAGNOSIS — M62.81 MUSCLE WEAKNESS OF LOWER EXTREMITY: ICD-10-CM

## 2020-08-13 DIAGNOSIS — M25.661 DECREASED RANGE OF MOTION (ROM) OF RIGHT KNEE: ICD-10-CM

## 2020-08-13 DIAGNOSIS — G89.29 CHRONIC PAIN OF RIGHT KNEE: Primary | ICD-10-CM

## 2020-10-09 ENCOUNTER — TELEPHONE (OUTPATIENT)
Dept: SPORTS MEDICINE | Facility: CLINIC | Age: 35
End: 2020-10-09

## 2020-10-09 DIAGNOSIS — Z98.890 S/P ACL RECONSTRUCTION: Primary | ICD-10-CM

## 2020-10-12 ENCOUNTER — PATIENT MESSAGE (OUTPATIENT)
Dept: OBSTETRICS AND GYNECOLOGY | Facility: CLINIC | Age: 35
End: 2020-10-12

## 2020-10-15 ENCOUNTER — PATIENT MESSAGE (OUTPATIENT)
Dept: INTERNAL MEDICINE | Facility: CLINIC | Age: 35
End: 2020-10-15

## 2020-10-21 ENCOUNTER — TELEPHONE (OUTPATIENT)
Dept: OBSTETRICS AND GYNECOLOGY | Facility: CLINIC | Age: 35
End: 2020-10-21

## 2020-10-21 ENCOUNTER — CLINICAL SUPPORT (OUTPATIENT)
Dept: REHABILITATION | Facility: HOSPITAL | Age: 35
End: 2020-10-21
Payer: COMMERCIAL

## 2020-10-21 DIAGNOSIS — M25.661 DECREASED RANGE OF MOTION (ROM) OF RIGHT KNEE: ICD-10-CM

## 2020-10-21 DIAGNOSIS — M62.81 MUSCLE WEAKNESS OF LOWER EXTREMITY: ICD-10-CM

## 2020-10-21 DIAGNOSIS — M25.561 CHRONIC PAIN OF RIGHT KNEE: Primary | ICD-10-CM

## 2020-10-21 DIAGNOSIS — G89.29 CHRONIC PAIN OF RIGHT KNEE: Primary | ICD-10-CM

## 2020-10-21 DIAGNOSIS — Z98.890 S/P ACL RECONSTRUCTION: ICD-10-CM

## 2020-10-21 PROCEDURE — 97110 THERAPEUTIC EXERCISES: CPT | Performed by: PHYSICAL THERAPIST

## 2020-10-21 PROCEDURE — 97164 PT RE-EVAL EST PLAN CARE: CPT | Performed by: PHYSICAL THERAPIST

## 2020-10-21 NOTE — PROGRESS NOTES
Physical Therapy Re-Evaluation      Visit Date: 10/21/2020     Name: Klarissa Brown  Clinic Number: [id number]    Therapy Diagnosis:   Muscle weakness lower extremity     Physician: Torres Villegas, *  Dr Cody     PROCEDURES(S) PERFORMED:   1. Right  Arthroscopy, anterior cruciate ligament reconstruction 17157  2.  Right  Arthroscopy, knee, synovectomy, limited 82707    GRAFT SOURCE:  Hamstring auto graft     DATE OF PROCEDURE: 10/10/2019     Physician Orders: PT Eval and Treat   Medical Diagnosis: Z98.890 (ICD-10-CM) - S/P ACL reconstruction  Evaluation Date: 11/14/2019  Authorization Period Expiration: 12/31/2019  Plan of Care Certification Period: 8/14/2020  Visit # / Visits authorized:  14   In 2020  (20 total)     Precautions: Standard       Subjective      Pt returns to PT 1 yr s/p R knee ACL reconstruction with ham autograft, post op care interrupted due to Covid concerns and pregnancy, pt delivered in August and now wishes to resume formal care  Pt feels her knee is       she NA  compliant with home exercise program given last session.   Response to previous treatment:  NA    Functional change: NA      Pain:  0/    10  At rest  Location: right knee     Objective     PO 12 months post op     Measurements taken:   PROM R 3/0/131  L 3/0/141  MMT quad 3+-4-/5, ham 3+-4-/5  Neg effusion, neg increase skin temp   Unable to fully extend knee in seated position due to patellar tracking dysfunction     Patient received  therapy to increase strength x 30'  with  activities as follows:     Stick upper/lower leg   Supine SLR 1x20:05 2.5#  LLR 1x20:05 2.5#  Butt winking 1x10:10   B bridge 2x10:10   Seated k' ext sara pushing foot into table leg 1x30:06  Long sit ham set 1x15:06        Home Exercises Provided and Patient Education Provided     Education provided:   -   Reviewed tracking issue and how quad and hip strength affects her condition     Written Home Exercises Provided:   Exercises were reviewed and  Guilherme was able to demonstrate them prior to the end of the session.  Guilherme demonstrated good  understanding of the education provided.    See above for exercises provided for this visit  10/21/2020    Assessment     michael Rx without increase in sxs,  Pt is 12 months s/p R knee ACL reconstruction and presents to PT with     Decreased range of motion  Decreased strength   Decreased functional status     Guilherme NA  progressing well towards her goals.   Prognosis is good .     Pt will continue to benefit from skilled outpatient physical therapy to address the deficits listed in the problem list box on initial evaluation, provide pt/family education and to maximize pt's level of independence in the home and community environment.     Pt's spiritual, cultural and educational needs considered and pt agreeable to plan of care and goals.    Anticipated barriers to physical therapy: none    Goals:     Short-Term Goals: 6  Weeks  (ALL MET)  - The patient will be independent with initial home exercise program. (MET)  - The patient is independent with donning/doffing brace to protect tissue healing. (MET)  - The patient will be independent amb with crutches on level tile for household distances.(MET)  - The patient will increase ROM by 15 degrees to perform ambulation and bathing and hygiene with pain < 0/10 (MET)  - The patient will increase strength by 1/2 muscle grade to perform ambulation and bathing and hygiene with pain < 0/10  (MET).    Long-Term Goals: 36 weeks  - The patient will be independent with home exercise program and symptom management.  - The patient will be independent amb with no assistive device on all surfaces for community distances.  - The patient will increase ROM = to uninvolved knee to perform ambulation, toileting, dressing and recreation/leisure activities  with pain < 0/10.  - The patient will increase strength = to uninvolved knee  to perform ambulation, toileting, dressing and recreation/leisure  activities   with pain < 0/10.      Plan     Focus on restoring full ROM and quad/ham strength before moving through a functional exercise progression     Continue with established Plan of Care towards PT goals with focus on decreasing pain, increasing ROM, strength, neuromuscular control and functional status       Sharan Short, PT

## 2020-10-21 NOTE — TELEPHONE ENCOUNTER
----- Message from Misa Julien sent at 10/21/2020  1:52 PM CDT -----  Contact: FATUMA ESPINOZA [[id number]]  Type: Patient Call Back Who called:FATUMA ESPINOZA [[id number]] What is the request in detail:Patient was calling in regards to scheduling her annual appointment. I attempted to schedule but nothing was populating for the remainder of the year. Patient was sent a message asking for her to schedule her annual appointment. Can the clinic reply by [facility]?no Would the patient rather a call back or a response via [facility]? Call back Best call back number:[phone] (mobile)   Additional Information:

## 2020-10-28 ENCOUNTER — PATIENT MESSAGE (OUTPATIENT)
Dept: INTERNAL MEDICINE | Facility: CLINIC | Age: 35
End: 2020-10-28

## 2020-10-30 ENCOUNTER — IMMUNIZATION (OUTPATIENT)
Dept: INTERNAL MEDICINE | Facility: CLINIC | Age: 35
End: 2020-10-30
Payer: COMMERCIAL

## 2020-10-30 ENCOUNTER — OFFICE VISIT (OUTPATIENT)
Dept: INTERNAL MEDICINE | Facility: CLINIC | Age: 35
End: 2020-10-30
Payer: COMMERCIAL

## 2020-10-30 VITALS
DIASTOLIC BLOOD PRESSURE: 74 MMHG | WEIGHT: 198 LBS | HEIGHT: 67 IN | SYSTOLIC BLOOD PRESSURE: 122 MMHG | HEART RATE: 80 BPM | BODY MASS INDEX: 31.08 KG/M2

## 2020-10-30 DIAGNOSIS — Z00.00 ANNUAL PHYSICAL EXAM: Primary | ICD-10-CM

## 2020-10-30 PROCEDURE — 99999 PR PBB SHADOW E&M-EST. PATIENT-LVL III: CPT | Mod: PBBFAC,,, | Performed by: STUDENT IN AN ORGANIZED HEALTH CARE EDUCATION/TRAINING PROGRAM

## 2020-10-30 PROCEDURE — 99395 PREV VISIT EST AGE 18-39: CPT | Mod: S$GLB,,, | Performed by: STUDENT IN AN ORGANIZED HEALTH CARE EDUCATION/TRAINING PROGRAM

## 2020-10-30 PROCEDURE — 90471 IMMUNIZATION ADMIN: CPT | Mod: S$GLB,,, | Performed by: INTERNAL MEDICINE

## 2020-10-30 PROCEDURE — 99999 PR PBB SHADOW E&M-EST. PATIENT-LVL III: ICD-10-PCS | Mod: PBBFAC,,, | Performed by: STUDENT IN AN ORGANIZED HEALTH CARE EDUCATION/TRAINING PROGRAM

## 2020-10-30 PROCEDURE — 3008F BODY MASS INDEX DOCD: CPT | Mod: CPTII,S$GLB,, | Performed by: STUDENT IN AN ORGANIZED HEALTH CARE EDUCATION/TRAINING PROGRAM

## 2020-10-30 PROCEDURE — 99395 PR PREVENTIVE VISIT,EST,18-39: ICD-10-PCS | Mod: S$GLB,,, | Performed by: STUDENT IN AN ORGANIZED HEALTH CARE EDUCATION/TRAINING PROGRAM

## 2020-10-30 PROCEDURE — 3008F PR BODY MASS INDEX (BMI) DOCUMENTED: ICD-10-PCS | Mod: CPTII,S$GLB,, | Performed by: STUDENT IN AN ORGANIZED HEALTH CARE EDUCATION/TRAINING PROGRAM

## 2020-10-30 PROCEDURE — 90471 FLU VACCINE (QUAD) GREATER THAN OR EQUAL TO 3YO PRESERVATIVE FREE IM: ICD-10-PCS | Mod: S$GLB,,, | Performed by: INTERNAL MEDICINE

## 2020-10-30 PROCEDURE — 90686 FLU VACCINE (QUAD) GREATER THAN OR EQUAL TO 3YO PRESERVATIVE FREE IM: ICD-10-PCS | Mod: S$GLB,,, | Performed by: INTERNAL MEDICINE

## 2020-10-30 PROCEDURE — 90686 IIV4 VACC NO PRSV 0.5 ML IM: CPT | Mod: S$GLB,,, | Performed by: INTERNAL MEDICINE

## 2020-10-30 NOTE — PROGRESS NOTES
"  Subjective     Chief Complaint: Annual    History of Present Illness:  Ms. Klarissa Brown is a 35 y.o. female here for an annual exam. Patient gave birth to her daughter on August 18th 2020. This is her first child. There were no complications during the pregnancy and things are going well at home. At no point in the last two weeks has she felt down, depressed, hopeless, or no longer getting pleasure from things she used to enjoy. She had her right ACL replaced last year and took a small break from in person PT meetings due to her pregnancy and covid. She recently started going back to in person PT sessions and has been doing rehab exercises at home. She has no active issues at this time.     She has not gotten a flu shot this year. Her last pap smear was in 2019 so she is not due for one until 2022.     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   She is starting to exercise more. Doing knee rehab exercises and tries to walk 30 min to 1 hour multiple times per week.   Has a great support system at home with the new child     Review of Systems   Constitutional: Negative for chills and fever.   HENT: Negative for congestion and sore throat.    Eyes: Negative for pain.   Respiratory: Negative for cough and shortness of breath.    Cardiovascular: Negative for chest pain, palpitations and leg swelling.   Gastrointestinal: Negative for abdominal pain, diarrhea, nausea and vomiting.   Genitourinary: Negative for dysuria.   Musculoskeletal: Negative for back pain.   Skin: Negative for rash.   Neurological: Negative for dizziness and headaches.   Psychiatric/Behavioral: The patient is not nervous/anxious.        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)     Has not had an outbreak in "a few years"    HSV (herpes simplex virus) anogenital infection     Last outbreak " was about 3-4 years ago       Past Surgical History:   Procedure Laterality Date    ARTHROSCOPIC CHONDROPLASTY OF KNEE JOINT Right 10/10/2019    Procedure: ARTHROSCOPY, KNEE, WITH CHONDROPLASTY;  Surgeon: Lázaro Cody MD;  Location: [facility] OR;  Service: Orthopedics;  Laterality: Right;    HERNIA REPAIR      as 1 year old    KNEE ARTHROSCOPY W/ ACL RECONSTRUCTION Right 10/10/2019    Procedure: RECONSTRUCTION, KNEE, ACL, ARTHROSCOPIC;  Surgeon: Lázaro Cody MD;  Location: [facility] OR;  Service: Orthopedics;  Laterality: Right;  Adductor,Exparel-Bupivacaine Liposome Injection 30cc,Clonidine/Epi/Ketorolac/Ropivacaine Injection 30cc  Arthroscopy equipment,Knee arthrotomy set,Mitek TrueSpan(curved),Mitek Vapor,Mitek adjustable rigidloop,Hamstring autograft,Muscle Stimulator,[facility] D    SYNOVECTOMY OF KNEE Right 10/10/2019    Procedure: SYNOVECTOMY, KNEE;  Surgeon: Lázaro Cody MD;  Location: [facility] OR;  Service: Orthopedics;  Laterality: Right;       Family History   Problem Relation Age of Onset    Sleep apnea Mother     Arthritis Mother         injuries from gymnastics; hypermobile joints    Anxiety disorder Mother     Depression Mother     Hypertension Father     Bipolar disorder Maternal Grandmother     Other Brother         hypomania but not bipolar d/o?    Cancer Neg Hx     Diabetes Neg Hx     Heart attack Neg Hx     Ovarian cancer Neg Hx     Breast cancer Neg Hx     Colon cancer Neg Hx     Thrombosis Neg Hx        Social History     Socioeconomic History    Marital status:      Spouse name: Not on file    Number of children: Not on file    Years of education: Not on file    Highest education level: Not on file   Occupational History    Not on file   Social Needs    Financial resource strain: Not on file    Food insecurity     Worry: Not on file     Inability: Not on file    Transportation needs     Medical: Not on file     Non-medical: Not on file   Tobacco Use    Smoking status:  "Never Smoker    Smokeless tobacco: Never Used   Substance and Sexual Activity    Alcohol use: Not Currently     Comment: 3-5 drinks/week    Drug use: No    Sexual activity: Yes     Partners: Male     Birth control/protection: None     Comment: Jay   Lifestyle    Physical activity     Days per week: Not on file     Minutes per session: Not on file    Stress: Not on file   Relationships    Social connections     Talks on phone: Not on file     Gets together: Not on file     Attends [employer] service: Not on file     Active member of club or organization: Not on file     Attends meetings of clubs or organizations: Not on file     Relationship status: Not on file   Other Topics Concern    Not on file   Social History Narrative    Klarissa works for the [employer]    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 This is Jay's first baby too!        No cats       MEDICATIONS & ALLERGIES:     Current Outpatient Medications on File Prior to Visit   Medication Sig    acyclovir (ZOVIRAX) 400 MG tablet TAKE 1 TABLET (400 MG TOTAL) BY MOUTH 4 (FOUR) TIMES DAILY. FOR 5 DAYS    aspirin (ECOTRIN) 325 MG EC tablet Take 1 tablet (325 mg total) by mouth once daily.    diclofenac sodium (VOLTAREN) 1 % Gel Apply 2 g topically 2 (two) times daily. Patellar tendon for 10 days    prenatal vit,calc76/iron/folic (PNV 29-1 ORAL) Take 1 tablet by mouth once daily.     No current facility-administered medications on file prior to visit.        Review of patient's allergies indicates:  No Known Allergies    OBJECTIVE:     Vital Signs:  Vitals:    10/30/20 1321   BP: 122/74   BP Location: Left arm   Patient Position: Sitting   Pulse: 80   Weight: 89.8 kg (197 lb 15.6 oz)   Height: 5' 7" (1.702 m)       Body mass index is 31.01 kg/m².     Physical Exam   Constitutional: She is oriented to person, place, and time and well-developed, well-nourished, and in no distress.   HENT:   Head: Normocephalic and " atraumatic.   Eyes: Pupils are equal, round, and reactive to light. Conjunctivae and EOM are normal.   Neck: Normal range of motion. Neck supple.   Cardiovascular: Normal rate, regular rhythm and normal heart sounds.   Pulmonary/Chest: Effort normal and breath sounds normal. No respiratory distress.   Abdominal: Soft. There is no abdominal tenderness.   Musculoskeletal: Normal range of motion.   Neurological: She is alert and oriented to person, place, and time.   Skin: Skin is warm.       Laboratory  Lab Results   Component Value Date    WBC 11.44 05/19/2020    HGB 11.6 (L) 05/19/2020    HCT 35.6 (L) 05/19/2020    MCV 87 05/19/2020     05/19/2020     BMP  Lab Results   Component Value Date     08/23/2018    K 4.8 08/23/2018     08/23/2018    CO2 26 08/23/2018    BUN 10 08/23/2018    CREATININE 0.9 08/23/2018    CALCIUM 9.6 08/23/2018    ANIONGAP 6 (L) 08/23/2018    ESTGFRAFRICA >60 08/23/2018    EGFRNONAA >60 08/23/2018     No results found for: INR, PROTIME  No results found for: HGBA1C  No results for input(s): POCTGLUCOSE in the last 72 hours.      Health Maintenance Due   Topic Date Due    Influenza Vaccine (1) 08/01/2020         ASSESSMENT & PLAN:   Ms. Klarissa Brown is a 35 y.o. female here for her annual exam. She is getting her flu shot today and basic labs. Blood pressure was well controlled at 122/74 and she is up to date on her health maintenance besides the flu shot.     Annual physical exam  -     Comprehensive Metabolic Panel; Future; Expected date: 10/30/2020  -     CBC Auto Differential; Future; Expected date: 10/30/2020        RTC in 1 year for an annual     Discussed with Dr. Ochoa  - staff attestation to follow      Desean Painting MD  Internal Medicine, PGY-I  [facility]  [address] [place] [address] [id number] [phone]

## 2020-11-04 ENCOUNTER — OFFICE VISIT (OUTPATIENT)
Dept: OBSTETRICS AND GYNECOLOGY | Facility: CLINIC | Age: 35
End: 2020-11-04
Attending: OBSTETRICS & GYNECOLOGY
Payer: COMMERCIAL

## 2020-11-04 VITALS
WEIGHT: 194.88 LBS | DIASTOLIC BLOOD PRESSURE: 70 MMHG | BODY MASS INDEX: 30.59 KG/M2 | HEIGHT: 67 IN | SYSTOLIC BLOOD PRESSURE: 112 MMHG

## 2020-11-04 DIAGNOSIS — N63.0 BREAST LUMP: ICD-10-CM

## 2020-11-04 DIAGNOSIS — Z30.9 ENCOUNTER FOR CONTRACEPTIVE MANAGEMENT, UNSPECIFIED TYPE: ICD-10-CM

## 2020-11-04 DIAGNOSIS — Z87.42 HISTORY OF ABNORMAL CERVICAL PAP SMEAR: Primary | ICD-10-CM

## 2020-11-04 PROBLEM — Z34.90 PREGNANT: Status: RESOLVED | Noted: 2020-02-02 | Resolved: 2020-11-04

## 2020-11-04 PROCEDURE — 87624 HPV HI-RISK TYP POOLED RSLT: CPT

## 2020-11-04 PROCEDURE — 88175 CYTOPATH C/V AUTO FLUID REDO: CPT | Performed by: PATHOLOGY

## 2020-11-04 PROCEDURE — 99999 PR PBB SHADOW E&M-EST. PATIENT-LVL III: CPT | Mod: PBBFAC,,, | Performed by: OBSTETRICS & GYNECOLOGY

## 2020-11-04 PROCEDURE — 3008F BODY MASS INDEX DOCD: CPT | Mod: CPTII,S$GLB,, | Performed by: OBSTETRICS & GYNECOLOGY

## 2020-11-04 PROCEDURE — 88141 PR  CYTOPATH CERV/VAG INTERPRET: ICD-10-PCS | Mod: ,,, | Performed by: PATHOLOGY

## 2020-11-04 PROCEDURE — 99999 PR PBB SHADOW E&M-EST. PATIENT-LVL III: ICD-10-PCS | Mod: PBBFAC,,, | Performed by: OBSTETRICS & GYNECOLOGY

## 2020-11-04 PROCEDURE — 3008F PR BODY MASS INDEX (BMI) DOCUMENTED: ICD-10-PCS | Mod: CPTII,S$GLB,, | Performed by: OBSTETRICS & GYNECOLOGY

## 2020-11-04 PROCEDURE — 88141 CYTOPATH C/V INTERPRET: CPT | Mod: ,,, | Performed by: PATHOLOGY

## 2020-11-04 PROCEDURE — 99395 PREV VISIT EST AGE 18-39: CPT | Mod: S$GLB,,, | Performed by: OBSTETRICS & GYNECOLOGY

## 2020-11-04 PROCEDURE — 99395 PR PREVENTIVE VISIT,EST,18-39: ICD-10-PCS | Mod: S$GLB,,, | Performed by: OBSTETRICS & GYNECOLOGY

## 2020-11-04 NOTE — PROGRESS NOTES
"History & Physical  Gynecology      SUBJECTIVE:     Chief Complaint: Well Woman       History of Present Illness:  Klarissa Brown is a 35 y.o.  who presents for well woman exam.     Has not had a cycle since delivery. Desires a mirena IUD. Still breastfeeding. Mood appropriate.   She also has a R breast lump that she noticed during her 3rd trimester. Not painful.     Last pap 2019 ASCUS/ HPV other positive with colpo 2019 with HSIL/ANA 2 and LEEP performed in 10/2019  Pathology from LEEP: "Moderate to severe dysplasia is located in the 3-6 o'clock , 6-9 o'clock and 9:00 to 12:00 quadrants. Cauterized margins are negative for dysplasia."    Review of patient's allergies indicates:  No Known Allergies    Past Medical History:   Diagnosis Date    Genital warts due to HPV (human papillomavirus)     Has not had an outbreak in "a few years"    HSV (herpes simplex virus) anogenital infection     Last outbreak was about 3-4 years ago     Past Surgical History:   Procedure Laterality Date    ARTHROSCOPIC CHONDROPLASTY OF KNEE JOINT Right 10/10/2019    Procedure: ARTHROSCOPY, KNEE, WITH CHONDROPLASTY;  Surgeon: Lázaro Cody MD;  Location: [facility] OR;  Service: Orthopedics;  Laterality: Right;    HERNIA REPAIR      as 1 year old    KNEE ARTHROSCOPY W/ ACL RECONSTRUCTION Right 10/10/2019    Procedure: RECONSTRUCTION, KNEE, ACL, ARTHROSCOPIC;  Surgeon: Lázaro Cody MD;  Location: [facility] OR;  Service: Orthopedics;  Laterality: Right;  Adductor,Exparel-Bupivacaine Liposome Injection 30cc,Clonidine/Epi/Ketorolac/Ropivacaine Injection 30cc  Arthroscopy equipment,Knee arthrotomy set,Mitek TrueSpan(curved),Mitek Vapor,Mitek adjustable rigidloop,Hamstring autograft,Muscle Stimulator,Ochs[facility] D    SYNOVECTOMY OF KNEE Right 10/10/2019    Procedure: SYNOVECTOMY, KNEE;  Surgeon: Lázaro Cody MD;  Location: [facility] OR;  Service: Orthopedics;  Laterality: Right;     OB History        1    Para   0    Term   0    "    0    AB   0    Living   0       SAB   0    TAB   0    Ectopic   0    Multiple   0    Live Births                   Family History   Problem Relation Age of Onset    Sleep apnea Mother     Arthritis Mother         injuries from gymnastics; hypermobile joints    Anxiety disorder Mother     Depression Mother     Hypertension Father     Bipolar disorder Maternal Grandmother     Other Brother         hypomania but not bipolar d/o?    Cancer Neg Hx     Diabetes Neg Hx     Heart attack Neg Hx     Ovarian cancer Neg Hx     Breast cancer Neg Hx     Colon cancer Neg Hx     Thrombosis Neg Hx      Social History     Tobacco Use    Smoking status: Never Smoker    Smokeless tobacco: Never Used   Substance Use Topics    Alcohol use: Not Currently     Comment: 3-5 drinks/week    Drug use: No       Current Outpatient Medications   Medication Sig    acyclovir (ZOVIRAX) 400 MG tablet TAKE 1 TABLET (400 MG TOTAL) BY MOUTH 4 (FOUR) TIMES DAILY. FOR 5 DAYS (Patient not taking: Reported on 2020)    aspirin (ECOTRIN) 325 MG EC tablet Take 1 tablet (325 mg total) by mouth once daily.    diclofenac sodium (VOLTAREN) 1 % Gel Apply 2 g topically 2 (two) times daily. Patellar tendon for 10 days    prenatal vit,calc76/iron/folic (PNV 29-1 ORAL) Take 1 tablet by mouth once daily.     No current facility-administered medications for this visit.          Review of Systems:  Review of Systems   Constitutional: Negative for activity change, chills, fatigue and fever.   HENT: Negative for nasal congestion.    Eyes: Negative for visual disturbance.   Respiratory: Negative for shortness of breath.    Cardiovascular: Negative for chest pain.   Gastrointestinal: Negative for abdominal pain, nausea and vomiting.   Endocrine: Negative for hot flashes.   Genitourinary: Negative for frequency, hematuria, vaginal bleeding and vaginal discharge.   Musculoskeletal: Negative for back pain.   Integumentary:  Negative for rash.    Neurological: Negative for headaches.   Psychiatric/Behavioral: Negative for depression.   Breast: Negative for lump       OBJECTIVE:     Physical Exam:  Physical Exam  Vitals signs reviewed.   Constitutional:       Appearance: She is well-developed.   HENT:      Head: Normocephalic and atraumatic.   Neck:      Musculoskeletal: Normal range of motion.   Pulmonary:      Effort: Pulmonary effort is normal.   Chest:      Breasts:         Right: Mass present.        Comments: R breast 2cm mobile lump  Abdominal:      General: Bowel sounds are normal. There is no distension.      Palpations: Abdomen is soft.      Tenderness: There is no abdominal tenderness. There is no guarding or rebound.   Genitourinary:     Vagina: No vaginal discharge.      Comments: Pap collected  Musculoskeletal: Normal range of motion.   Skin:     General: Skin is warm and dry.   Neurological:      Mental Status: She is alert and oriented to person, place, and time.           ASSESSMENT:       ICD-10-CM ICD-9-CM    1. History of abnormal cervical Pap smear  Z87.42 V13.29 Liquid-Based Pap Smear, Screening      HPV High Risk Genotypes, PCR   2. Encounter for contraceptive management, unspecified type  Z30.9 V25.9 Device Authorization Order   3. Breast lump  N63.0 611.72 US Breast Right Complete      CANCELED: US Breast Left Complete          Plan:      Klarissa was seen today for well woman.    Diagnoses and all orders for this visit:    History of abnormal cervical Pap smear  -     Liquid-Based Pap Smear, Screening  -     HPV High Risk Genotypes, PCR    Encounter for contraceptive management, unspecified type  -     Device Authorization Order    Breast lump  -     Cancel: US Breast Left Complete; Future  -     US Breast Right Complete; Future        Counseling time: 30 minutes    Sunshine Soler

## 2020-11-09 NOTE — PROGRESS NOTES
I have reviewed the notes, assessments, and/or procedures performed this visit, and I concur with the assessment and plan.

## 2020-11-11 ENCOUNTER — CLINICAL SUPPORT (OUTPATIENT)
Dept: REHABILITATION | Facility: HOSPITAL | Age: 35
End: 2020-11-11
Attending: ORTHOPAEDIC SURGERY
Payer: COMMERCIAL

## 2020-11-11 DIAGNOSIS — G89.29 CHRONIC PAIN OF RIGHT KNEE: Primary | ICD-10-CM

## 2020-11-11 DIAGNOSIS — M62.81 MUSCLE WEAKNESS OF LOWER EXTREMITY: ICD-10-CM

## 2020-11-11 DIAGNOSIS — M25.561 CHRONIC PAIN OF RIGHT KNEE: Primary | ICD-10-CM

## 2020-11-11 DIAGNOSIS — M25.661 DECREASED RANGE OF MOTION (ROM) OF RIGHT KNEE: ICD-10-CM

## 2020-11-11 PROCEDURE — 97110 THERAPEUTIC EXERCISES: CPT | Performed by: PHYSICAL THERAPIST

## 2020-11-11 NOTE — PROGRESS NOTES
Physical Therapy Re-Evaluation      Visit Date: 11/11/2020     Name: Klarissa Brown  Clinic Number: [id number]    Therapy Diagnosis:   Muscle weakness lower extremity     Physician: Torres Villegas, *  Dr Cody     PROCEDURES(S) PERFORMED:   1. Right  Arthroscopy, anterior cruciate ligament reconstruction 05713  2.  Right  Arthroscopy, knee, synovectomy, limited 47088    GRAFT SOURCE:  Hamstring auto graft     DATE OF PROCEDURE: 10/10/2019     Physician Orders: PT Eval and Treat   Medical Diagnosis: Z98.890 (ICD-10-CM) - S/P ACL reconstruction  Evaluation Date: 11/14/2019  Authorization Period Expiration: 12/31/2019  Plan of Care Certification Period: 8/14/2020  Visit # / Visits authorized:  15   In 2020  (21 total)     Precautions: Standard       Subjective     Pt reports intermittent anterior knee pain in PF region and patellar tendon region with squatting or AROM k' extension in open chain     she  was  compliant with home exercise program given last session.   Response to previous treatment:  Good    Functional change: none as yet     Pain:  0/    10  At rest  Location: right knee     Objective     PO approx 13  months post op     Measurements taken:  None taken this visit     ( PROM R 3/0/131  L 3/0/141  MMT quad 3+-4-/5, ham 3+-4-/5)    Patient received  therapy to increase strength x 30'  with  activities as follows:     Stick upper/lower leg   Bike x 10'   Supine SLR 2x10:05 3#  LLR 3x10:05  0# with back against the wall   B bridge 3x10:05 with back on wt bench   HS seated ham curls B 3x10 10#  Supine chops with TA 2x10 R/L Or cook band         Home Exercises Provided and Patient Education Provided     Education provided:   -   Reviewed tracking issue and how quad and hip strength affects her condition     Written Home Exercises Provided:   Exercises were reviewed and Guilherme was able to demonstrate them prior to the end of the session.  Guilherme demonstrated good  understanding of the education  provided.    See above for exercises provided for this visit  10/21/2020 add chops     Assessment     michael Rx without increase in sxs,   Pt with anterior knee pain with attempted knee extension on machine and at EOT     Guilherme is  progressing fair towards her goals.   Prognosis is good .    Pt will continue to benefit from skilled outpatient physical therapy to address the deficits listed in the problem list box on initial evaluation, provide pt/family education and to maximize pt's level of independence in the home and community environment.     Pt's spiritual, cultural and educational needs considered and pt agreeable to plan of care and goals.    Anticipated barriers to physical therapy: none    Goals:     Short-Term Goals:  6 weeks   - The patient will be independent with initial home exercise program. (MET)  - - The patient will increase strength by 1/2 muscle grade to perform  including squatting, lifting her daughter  with pain < 0/10  (MET).    Long-Term Goals: 16 weeks  - The patient will be independent with home exercise program and symptom management.  - The patient will be independent amb with no assistive device on all surfaces for community distances for exercise with <0/10 pain   - The patient will increase strength = to uninvolved knee  to perform stairs and recreation/leisure activities   with pain < 0/10.      Plan     Focus on restoring  Quad/ham / hip and gastroc strength     Continue with established Plan of Care towards PT goals 1x/week x 16 weeks  with focus on decreasing pain, increasing ROM, strength, neuromuscular control and functional status       Sharan Short, PT

## 2020-11-12 ENCOUNTER — HOSPITAL ENCOUNTER (OUTPATIENT)
Dept: RADIOLOGY | Facility: OTHER | Age: 35
Discharge: HOME | End: 2020-11-12
Attending: STUDENT IN AN ORGANIZED HEALTH CARE EDUCATION/TRAINING PROGRAM
Payer: COMMERCIAL

## 2020-11-12 DIAGNOSIS — N63.0 BREAST LUMP: ICD-10-CM

## 2020-11-12 DIAGNOSIS — N63.0 BREAST LUMP: Primary | ICD-10-CM

## 2020-11-12 PROCEDURE — 76642 US BREAST RIGHT LIMITED: ICD-10-PCS | Mod: 26,RT,, | Performed by: RADIOLOGY

## 2020-11-12 PROCEDURE — 76642 ULTRASOUND BREAST LIMITED: CPT | Mod: TC,RT

## 2020-11-12 PROCEDURE — 76642 ULTRASOUND BREAST LIMITED: CPT | Mod: 26,RT,, | Performed by: RADIOLOGY

## 2020-11-13 LAB
HPV HR 12 DNA SPEC QL NAA+PROBE: NEGATIVE
HPV16 AG SPEC QL: NEGATIVE
HPV18 DNA SPEC QL NAA+PROBE: NEGATIVE

## 2020-11-14 ENCOUNTER — PATIENT MESSAGE (OUTPATIENT)
Dept: OBSTETRICS AND GYNECOLOGY | Facility: CLINIC | Age: 35
End: 2020-11-14

## 2020-11-17 ENCOUNTER — TELEPHONE (OUTPATIENT)
Dept: OBSTETRICS AND GYNECOLOGY | Facility: CLINIC | Age: 35
End: 2020-11-17

## 2020-11-17 ENCOUNTER — TELEPHONE (OUTPATIENT)
Dept: RADIOLOGY | Facility: OTHER | Age: 35
End: 2020-11-17

## 2020-11-17 DIAGNOSIS — Z30.430 ENCOUNTER FOR IUD INSERTION: Primary | ICD-10-CM

## 2020-11-17 NOTE — TELEPHONE ENCOUNTER
Called patient to follow up on recommendation for breast biopsy. Patient states she has spoken with her OBGYN and would like to opt for 3 month follow up. Scheduled patient for breast ultrasound in late February. Instructed patient to call me directly if she would like to be seen sooner for any concerning changes.

## 2020-11-20 ENCOUNTER — PATIENT MESSAGE (OUTPATIENT)
Dept: OBSTETRICS AND GYNECOLOGY | Facility: CLINIC | Age: 35
End: 2020-11-20

## 2020-12-01 ENCOUNTER — PATIENT MESSAGE (OUTPATIENT)
Dept: OBSTETRICS AND GYNECOLOGY | Facility: CLINIC | Age: 35
End: 2020-12-01

## 2020-12-03 ENCOUNTER — CLINICAL SUPPORT (OUTPATIENT)
Dept: REHABILITATION | Facility: HOSPITAL | Age: 35
End: 2020-12-03
Attending: ORTHOPAEDIC SURGERY
Payer: COMMERCIAL

## 2020-12-03 DIAGNOSIS — M62.81 MUSCLE WEAKNESS OF LOWER EXTREMITY: ICD-10-CM

## 2020-12-03 DIAGNOSIS — M25.561 CHRONIC PAIN OF RIGHT KNEE: Primary | ICD-10-CM

## 2020-12-03 DIAGNOSIS — M25.661 DECREASED RANGE OF MOTION (ROM) OF RIGHT KNEE: ICD-10-CM

## 2020-12-03 DIAGNOSIS — G89.29 CHRONIC PAIN OF RIGHT KNEE: Primary | ICD-10-CM

## 2020-12-03 PROCEDURE — 97110 THERAPEUTIC EXERCISES: CPT | Performed by: PHYSICAL THERAPIST

## 2020-12-03 NOTE — PROGRESS NOTES
Physical Therapy Daily Treatment Note      Visit Date: 12/3/2020     Name: Klarissa Brown  Clinic Number: [id number]    Therapy Diagnosis:   Muscle weakness lower extremity     Physician: Torres Villegas, *  Dr Cody     PROCEDURES(S) PERFORMED:   1. Right  Arthroscopy, anterior cruciate ligament reconstruction 82377  2.  Right  Arthroscopy, knee, synovectomy, limited 71516    GRAFT SOURCE:  Hamstring auto graft     DATE OF PROCEDURE: 10/10/2019     Physician Orders: PT Eval and Treat   Medical Diagnosis: Z98.890 (ICD-10-CM) - S/P ACL reconstruction  Evaluation Date: 11/14/2019  Authorization Period Expiration: 12/31/2019  Plan of Care Certification Period: 8/14/2020  Visit # / Visits authorized:  16  In 2020  (22 total)     Time In: 11:30  Time out: 12:30  Total Billable time: 45'     Precautions: Standard       Subjective     Pt reports continued difficulty with stairs and squatting down, unable to kneel on R knee     she  was  compliant with home exercise program given last session.   Response to previous treatment:  Good    Functional change: none as yet     Pain:  0/    10  At rest  Location: right knee     Objective     PO approx 14  months post op     Measurements taken:  None taken this visit     ( PROM R 3/0/131  L 3/0/141  MMT quad 3+-4-/5, ham 3+-4-/5)    Patient received  therapy to increase strength x 45'  with  activities as follows:     ET x 7'   Supine SLR 2x10:05 4#  HS seated k' ext at 60 deg knee angle sara hold x 15' +NMES  seated ham curls  1x15 plum TB and 2x15 MR   rev'd wall sits, LLR against wall with weight         Home Exercises Provided and Patient Education Provided     Education provided:   - pt may bike outside, may attempt yoga     Written Home Exercises Provided:   Exercises were reviewed and Guilherme was able to demonstrate them prior to the end of the session.  Guilherme demonstrated good  understanding of the education provided.    See above for exercises provided for this  visit  10/21/2020 add chops     Assessment     michael Rx without increase in sxs,   Improved quad activation and ability to perform open chain knee extension overall and post Rx but pt continues with muscle weakness in quad and hamstring affecting ADLs and caring for her baby     Guilherme is  progressing fair towards her goals.   Prognosis is good .    Pt will continue to benefit from skilled outpatient physical therapy to address the deficits listed in the problem list box on initial evaluation, provide pt/family education and to maximize pt's level of independence in the home and community environment.     Pt's spiritual, cultural and educational needs considered and pt agreeable to plan of care and goals.    Anticipated barriers to physical therapy: none    Goals:     Short-Term Goals:  6 weeks   - The patient will be independent with initial home exercise program. (MET)  - - The patient will increase strength by 1/2 muscle grade to perform  including squatting, lifting her daughter  with pain < 0/10  (MET).    Long-Term Goals: 16 weeks  - The patient will be independent with home exercise program and symptom management.  - The patient will be independent amb with no assistive device on all surfaces for community distances for exercise with <0/10 pain   - The patient will increase strength = to uninvolved knee  to perform stairs and recreation/leisure activities   with pain < 0/10.      Plan     Focus on restoring  Quad/ham / hip and gastroc strength     Continue with established Plan of Care towards PT goals 1x/week x 16 weeks  with focus on decreasing pain, increasing ROM, strength, neuromuscular control and functional status       Sharan Short, PT

## 2020-12-14 LAB
FINAL PATHOLOGIC DIAGNOSIS: ABNORMAL
Lab: ABNORMAL

## 2020-12-31 ENCOUNTER — CLINICAL SUPPORT (OUTPATIENT)
Dept: REHABILITATION | Facility: HOSPITAL | Age: 35
End: 2020-12-31
Attending: ORTHOPAEDIC SURGERY
Payer: COMMERCIAL

## 2020-12-31 DIAGNOSIS — M25.561 CHRONIC PAIN OF RIGHT KNEE: Primary | ICD-10-CM

## 2020-12-31 DIAGNOSIS — M25.661 DECREASED RANGE OF MOTION (ROM) OF RIGHT KNEE: ICD-10-CM

## 2020-12-31 DIAGNOSIS — G89.29 CHRONIC PAIN OF RIGHT KNEE: Primary | ICD-10-CM

## 2020-12-31 DIAGNOSIS — M62.81 MUSCLE WEAKNESS OF LOWER EXTREMITY: ICD-10-CM

## 2020-12-31 PROCEDURE — 97110 THERAPEUTIC EXERCISES: CPT | Performed by: PHYSICAL THERAPIST

## 2020-12-31 NOTE — PROGRESS NOTES
"  Physical Therapy Daily Treatment Note      Visit Date: 12/31/2020     Name: Klarissa Brown  Clinic Number: [id number]    Therapy Diagnosis:   Muscle weakness lower extremity     Physician: Torres Villegas, *  Dr Cody     PROCEDURES(S) PERFORMED:   1. Right  Arthroscopy, anterior cruciate ligament reconstruction 62013  2.  Right  Arthroscopy, knee, synovectomy, limited 33281    GRAFT SOURCE:  Hamstring auto graft     DATE OF PROCEDURE: 10/10/2019     Physician Orders: PT Eval and Treat   Medical Diagnosis: Z98.890 (ICD-10-CM) - S/P ACL reconstruction  Evaluation Date: 11/14/2019  Authorization Period Expiration: 12/31/2019  Plan of Care Certification Period: 8/14/2020  Visit # / Visits authorized:  17  In 2020  (23 total)     Time In: 13:30  Time out: 14:45  Total Billable time: 60'     Precautions: Standard       Subjective     Pt reports that she feels a little stronger in her quad with HEP, has been riding stationary and outside bike with good results "I just need a little more confidence stopping the bike and putting my leg down"     she  was  compliant with home exercise program given last session.   Response to previous treatment:  Good    Functional change: see subjective above      Pain:  0/    10  At rest  Location: right knee     Objective     PO approx 15  months post op     Measurements taken:  MMT quad 4-/5, ham 4-/5      ( PROM R 3/0/131  L 3/0/141  MMT quad 3+-4-/5, ham 3+-4-/5)    Patient received  therapy to increase strength x 45'  with  activities as follows:     St ham curls with tibial IR 2x10:01 0#  St U HR 3xburn   HS seated k' ext at 60 deg knee angle sara hold x 15' +NMES  wall sits 5x:30   LSU 3# 3x10   SLS 5x:15       Home Exercises Provided and Patient Education Provided     Education provided:   - need to perform tibial IR with ham curls    Written Home Exercises Provided:   Exercises were reviewed and Guilherme was able to demonstrate them prior to the end of the session.  Guilherme " demonstrated good  understanding of the education provided.    See above for exercises provided for this visit  12/31/2020      Assessment     michael Rx without increase in sxs,  Slight improvement in both ham and quad strength, pt with decreased muscle strength and endurance in both muscle groups   Affecting ADLs    Guilherme is  progressing fair towards her goals.   Prognosis is good .    Pt will continue to benefit from skilled outpatient physical therapy to address the deficits listed in the problem list box on initial evaluation, provide pt/family education and to maximize pt's level of independence in the home and community environment.     Pt's spiritual, cultural and educational needs considered and pt agreeable to plan of care and goals.    Anticipated barriers to physical therapy: none    Goals:     Short-Term Goals:  6 weeks   - The patient will be independent with initial home exercise program. (MET)  - - The patient will increase strength by 1/2 muscle grade to perform  including squatting, lifting her daughter  with pain < 0/10  (MET).    Long-Term Goals: 16 weeks  - The patient will be independent with home exercise program and symptom management.  - The patient will be independent amb with no assistive device on all surfaces for community distances for exercise with <0/10 pain   - The patient will increase strength = to uninvolved knee  to perform stairs and recreation/leisure activities   with pain < 0/10.      Plan     Focus on restoring  Quad/ham  Strength and balance      Continue with established Plan of Care towards PT goals 1x/week x 16 weeks  with focus on decreasing pain, increasing ROM, strength, neuromuscular control and functional status       Sharan Short, PT

## 2021-01-04 ENCOUNTER — TELEPHONE (OUTPATIENT)
Dept: OBSTETRICS AND GYNECOLOGY | Facility: CLINIC | Age: 36
End: 2021-01-04

## 2021-01-04 DIAGNOSIS — Z97.5 INTRAUTERINE CONTRACEPTIVE DEVICE: Primary | ICD-10-CM

## 2021-01-14 ENCOUNTER — CLINICAL SUPPORT (OUTPATIENT)
Dept: REHABILITATION | Facility: HOSPITAL | Age: 36
End: 2021-01-14
Attending: INTERNAL MEDICINE
Payer: COMMERCIAL

## 2021-01-14 DIAGNOSIS — M62.81 MUSCLE WEAKNESS OF LOWER EXTREMITY: ICD-10-CM

## 2021-01-14 DIAGNOSIS — M25.561 CHRONIC PAIN OF RIGHT KNEE: Primary | ICD-10-CM

## 2021-01-14 DIAGNOSIS — G89.29 CHRONIC PAIN OF RIGHT KNEE: Primary | ICD-10-CM

## 2021-01-14 DIAGNOSIS — M25.661 DECREASED RANGE OF MOTION (ROM) OF RIGHT KNEE: ICD-10-CM

## 2021-01-14 PROCEDURE — 97110 THERAPEUTIC EXERCISES: CPT | Performed by: PHYSICAL THERAPIST

## 2021-01-27 ENCOUNTER — CLINICAL SUPPORT (OUTPATIENT)
Dept: REHABILITATION | Facility: HOSPITAL | Age: 36
End: 2021-01-27
Attending: ORTHOPAEDIC SURGERY
Payer: COMMERCIAL

## 2021-01-27 DIAGNOSIS — M62.81 MUSCLE WEAKNESS OF LOWER EXTREMITY: ICD-10-CM

## 2021-01-27 DIAGNOSIS — G89.29 CHRONIC PAIN OF RIGHT KNEE: Primary | ICD-10-CM

## 2021-01-27 DIAGNOSIS — M25.561 CHRONIC PAIN OF RIGHT KNEE: Primary | ICD-10-CM

## 2021-01-27 DIAGNOSIS — M25.661 DECREASED RANGE OF MOTION (ROM) OF RIGHT KNEE: ICD-10-CM

## 2021-01-27 PROCEDURE — 97110 THERAPEUTIC EXERCISES: CPT | Performed by: PHYSICAL THERAPIST

## 2021-01-29 ENCOUNTER — PATIENT MESSAGE (OUTPATIENT)
Dept: OBSTETRICS AND GYNECOLOGY | Facility: CLINIC | Age: 36
End: 2021-01-29

## 2021-02-02 ENCOUNTER — TELEPHONE (OUTPATIENT)
Dept: OBSTETRICS AND GYNECOLOGY | Facility: CLINIC | Age: 36
End: 2021-02-02

## 2021-02-03 ENCOUNTER — PROCEDURE VISIT (OUTPATIENT)
Dept: OBSTETRICS AND GYNECOLOGY | Facility: CLINIC | Age: 36
End: 2021-02-03
Attending: OBSTETRICS & GYNECOLOGY
Payer: COMMERCIAL

## 2021-02-03 ENCOUNTER — PATIENT MESSAGE (OUTPATIENT)
Dept: OBSTETRICS AND GYNECOLOGY | Facility: CLINIC | Age: 36
End: 2021-02-03

## 2021-02-03 VITALS
SYSTOLIC BLOOD PRESSURE: 132 MMHG | DIASTOLIC BLOOD PRESSURE: 82 MMHG | HEIGHT: 67 IN | BODY MASS INDEX: 30.17 KG/M2 | WEIGHT: 192.25 LBS

## 2021-02-03 DIAGNOSIS — Z30.430 ENCOUNTER FOR IUD INSERTION: Primary | ICD-10-CM

## 2021-02-03 DIAGNOSIS — Z30.431 FAMILY PLANNING, IUD (INTRAUTERINE DEVICE) CHECK/REINSERTION/REMOVAL: ICD-10-CM

## 2021-02-03 DIAGNOSIS — R10.2 PELVIC PAIN IN FEMALE: Primary | ICD-10-CM

## 2021-02-03 LAB
B-HCG UR QL: NEGATIVE
CTP QC/QA: YES

## 2021-02-03 PROCEDURE — 99499 UNLISTED E&M SERVICE: CPT | Mod: S$GLB,,, | Performed by: OBSTETRICS & GYNECOLOGY

## 2021-02-03 PROCEDURE — 99499 NO LOS: ICD-10-PCS | Mod: S$GLB,,, | Performed by: OBSTETRICS & GYNECOLOGY

## 2021-02-03 PROCEDURE — 58300 INSERTION OF IUD: ICD-10-PCS | Mod: S$GLB,,, | Performed by: OBSTETRICS & GYNECOLOGY

## 2021-02-03 PROCEDURE — 58300 INSERT INTRAUTERINE DEVICE: CPT | Mod: S$GLB,,, | Performed by: OBSTETRICS & GYNECOLOGY

## 2021-02-04 ENCOUNTER — PATIENT MESSAGE (OUTPATIENT)
Dept: OBSTETRICS AND GYNECOLOGY | Facility: CLINIC | Age: 36
End: 2021-02-04

## 2021-02-04 ENCOUNTER — HOSPITAL ENCOUNTER (OUTPATIENT)
Dept: RADIOLOGY | Facility: OTHER | Age: 36
Discharge: HOME | End: 2021-02-04
Attending: OBSTETRICS & GYNECOLOGY
Payer: COMMERCIAL

## 2021-02-04 DIAGNOSIS — R10.2 PELVIC PAIN IN FEMALE: ICD-10-CM

## 2021-02-04 DIAGNOSIS — Z30.431 FAMILY PLANNING, IUD (INTRAUTERINE DEVICE) CHECK/REINSERTION/REMOVAL: ICD-10-CM

## 2021-02-04 PROCEDURE — 76830 TRANSVAGINAL US NON-OB: CPT | Mod: 26,,, | Performed by: RADIOLOGY

## 2021-02-04 PROCEDURE — 76856 US EXAM PELVIC COMPLETE: CPT | Mod: TC

## 2021-02-04 PROCEDURE — 76856 US EXAM PELVIC COMPLETE: CPT | Mod: 26,,, | Performed by: RADIOLOGY

## 2021-02-04 PROCEDURE — 76830 US PELVIS COMP WITH TRANSVAG NON-OB (XPD): ICD-10-PCS | Mod: 26,,, | Performed by: RADIOLOGY

## 2021-02-04 PROCEDURE — 76856 US PELVIS COMP WITH TRANSVAG NON-OB (XPD): ICD-10-PCS | Mod: 26,,, | Performed by: RADIOLOGY

## 2021-02-11 ENCOUNTER — CLINICAL SUPPORT (OUTPATIENT)
Dept: REHABILITATION | Facility: HOSPITAL | Age: 36
End: 2021-02-11
Attending: ORTHOPAEDIC SURGERY
Payer: COMMERCIAL

## 2021-02-11 DIAGNOSIS — G89.29 CHRONIC PAIN OF RIGHT KNEE: Primary | ICD-10-CM

## 2021-02-11 DIAGNOSIS — M25.561 CHRONIC PAIN OF RIGHT KNEE: Primary | ICD-10-CM

## 2021-02-11 DIAGNOSIS — M25.661 DECREASED RANGE OF MOTION (ROM) OF RIGHT KNEE: ICD-10-CM

## 2021-02-11 DIAGNOSIS — M62.81 MUSCLE WEAKNESS OF LOWER EXTREMITY: ICD-10-CM

## 2021-02-11 PROCEDURE — 97110 THERAPEUTIC EXERCISES: CPT | Performed by: PHYSICAL THERAPIST

## 2021-02-12 ENCOUNTER — PATIENT MESSAGE (OUTPATIENT)
Dept: OBSTETRICS AND GYNECOLOGY | Facility: CLINIC | Age: 36
End: 2021-02-12

## 2021-02-24 ENCOUNTER — CLINICAL SUPPORT (OUTPATIENT)
Dept: REHABILITATION | Facility: HOSPITAL | Age: 36
End: 2021-02-24
Attending: ORTHOPAEDIC SURGERY
Payer: COMMERCIAL

## 2021-02-24 DIAGNOSIS — G89.29 CHRONIC PAIN OF RIGHT KNEE: Primary | ICD-10-CM

## 2021-02-24 DIAGNOSIS — M25.661 DECREASED RANGE OF MOTION (ROM) OF RIGHT KNEE: ICD-10-CM

## 2021-02-24 DIAGNOSIS — M62.81 MUSCLE WEAKNESS OF LOWER EXTREMITY: ICD-10-CM

## 2021-02-24 DIAGNOSIS — M25.561 CHRONIC PAIN OF RIGHT KNEE: Primary | ICD-10-CM

## 2021-02-24 PROCEDURE — 97110 THERAPEUTIC EXERCISES: CPT | Performed by: PHYSICAL THERAPIST

## 2021-02-25 ENCOUNTER — HOSPITAL ENCOUNTER (OUTPATIENT)
Dept: RADIOLOGY | Facility: OTHER | Age: 36
Discharge: HOME | End: 2021-02-25
Attending: STUDENT IN AN ORGANIZED HEALTH CARE EDUCATION/TRAINING PROGRAM
Payer: COMMERCIAL

## 2021-02-25 ENCOUNTER — OFFICE VISIT (OUTPATIENT)
Dept: OBSTETRICS AND GYNECOLOGY | Facility: CLINIC | Age: 36
End: 2021-02-25
Attending: OBSTETRICS & GYNECOLOGY
Payer: COMMERCIAL

## 2021-02-25 VITALS
DIASTOLIC BLOOD PRESSURE: 78 MMHG | WEIGHT: 191.38 LBS | HEIGHT: 67 IN | BODY MASS INDEX: 30.04 KG/M2 | SYSTOLIC BLOOD PRESSURE: 110 MMHG

## 2021-02-25 DIAGNOSIS — Z30.431 FAMILY PLANNING, IUD (INTRAUTERINE DEVICE) CHECK/REINSERTION/REMOVAL: Primary | ICD-10-CM

## 2021-02-25 DIAGNOSIS — R92.8 ABNORMAL MAMMOGRAM: ICD-10-CM

## 2021-02-25 PROCEDURE — 99213 OFFICE O/P EST LOW 20 MIN: CPT | Mod: S$GLB,,, | Performed by: OBSTETRICS & GYNECOLOGY

## 2021-02-25 PROCEDURE — 76642 US BREAST RIGHT LIMITED: ICD-10-PCS | Mod: 26,RT,, | Performed by: RADIOLOGY

## 2021-02-25 PROCEDURE — 99213 PR OFFICE/OUTPT VISIT, EST, LEVL III, 20-29 MIN: ICD-10-PCS | Mod: S$GLB,,, | Performed by: OBSTETRICS & GYNECOLOGY

## 2021-02-25 PROCEDURE — 3008F BODY MASS INDEX DOCD: CPT | Mod: CPTII,S$GLB,, | Performed by: OBSTETRICS & GYNECOLOGY

## 2021-02-25 PROCEDURE — 99999 PR PBB SHADOW E&M-EST. PATIENT-LVL III: ICD-10-PCS | Mod: PBBFAC,,, | Performed by: OBSTETRICS & GYNECOLOGY

## 2021-02-25 PROCEDURE — 1126F AMNT PAIN NOTED NONE PRSNT: CPT | Mod: S$GLB,,, | Performed by: OBSTETRICS & GYNECOLOGY

## 2021-02-25 PROCEDURE — 76642 ULTRASOUND BREAST LIMITED: CPT | Mod: TC,RT

## 2021-02-25 PROCEDURE — 76642 ULTRASOUND BREAST LIMITED: CPT | Mod: 26,RT,, | Performed by: RADIOLOGY

## 2021-02-25 PROCEDURE — 3008F PR BODY MASS INDEX (BMI) DOCUMENTED: ICD-10-PCS | Mod: CPTII,S$GLB,, | Performed by: OBSTETRICS & GYNECOLOGY

## 2021-02-25 PROCEDURE — 1126F PR PAIN SEVERITY QUANTIFIED, NO PAIN PRESENT: ICD-10-PCS | Mod: S$GLB,,, | Performed by: OBSTETRICS & GYNECOLOGY

## 2021-02-25 PROCEDURE — 99999 PR PBB SHADOW E&M-EST. PATIENT-LVL III: CPT | Mod: PBBFAC,,, | Performed by: OBSTETRICS & GYNECOLOGY

## 2021-03-18 ENCOUNTER — CLINICAL SUPPORT (OUTPATIENT)
Dept: REHABILITATION | Facility: HOSPITAL | Age: 36
End: 2021-03-18
Attending: ORTHOPAEDIC SURGERY
Payer: COMMERCIAL

## 2021-03-18 DIAGNOSIS — M25.661 DECREASED RANGE OF MOTION (ROM) OF RIGHT KNEE: ICD-10-CM

## 2021-03-18 DIAGNOSIS — G89.29 CHRONIC PAIN OF RIGHT KNEE: Primary | ICD-10-CM

## 2021-03-18 DIAGNOSIS — M25.561 CHRONIC PAIN OF RIGHT KNEE: Primary | ICD-10-CM

## 2021-03-18 DIAGNOSIS — M62.81 MUSCLE WEAKNESS OF LOWER EXTREMITY: ICD-10-CM

## 2021-03-18 PROCEDURE — 97110 THERAPEUTIC EXERCISES: CPT | Performed by: PHYSICAL THERAPIST

## 2021-04-08 ENCOUNTER — CLINICAL SUPPORT (OUTPATIENT)
Dept: REHABILITATION | Facility: HOSPITAL | Age: 36
End: 2021-04-08
Attending: ORTHOPAEDIC SURGERY
Payer: COMMERCIAL

## 2021-04-08 DIAGNOSIS — G89.29 CHRONIC PAIN OF RIGHT KNEE: Primary | ICD-10-CM

## 2021-04-08 DIAGNOSIS — M62.81 MUSCLE WEAKNESS OF LOWER EXTREMITY: ICD-10-CM

## 2021-04-08 DIAGNOSIS — M25.661 DECREASED RANGE OF MOTION (ROM) OF RIGHT KNEE: ICD-10-CM

## 2021-04-08 DIAGNOSIS — M25.561 CHRONIC PAIN OF RIGHT KNEE: Primary | ICD-10-CM

## 2021-04-08 PROCEDURE — 97110 THERAPEUTIC EXERCISES: CPT | Performed by: PHYSICAL THERAPIST

## 2021-04-29 ENCOUNTER — CLINICAL SUPPORT (OUTPATIENT)
Dept: REHABILITATION | Facility: HOSPITAL | Age: 36
End: 2021-04-29
Attending: ORTHOPAEDIC SURGERY
Payer: COMMERCIAL

## 2021-04-29 DIAGNOSIS — M25.661 DECREASED RANGE OF MOTION (ROM) OF RIGHT KNEE: ICD-10-CM

## 2021-04-29 DIAGNOSIS — M25.561 CHRONIC PAIN OF RIGHT KNEE: Primary | ICD-10-CM

## 2021-04-29 DIAGNOSIS — G89.29 CHRONIC PAIN OF RIGHT KNEE: Primary | ICD-10-CM

## 2021-04-29 DIAGNOSIS — M62.81 MUSCLE WEAKNESS OF LOWER EXTREMITY: ICD-10-CM

## 2021-04-29 PROCEDURE — 97110 THERAPEUTIC EXERCISES: CPT | Performed by: PHYSICAL THERAPIST

## 2021-05-13 ENCOUNTER — CLINICAL SUPPORT (OUTPATIENT)
Dept: REHABILITATION | Facility: HOSPITAL | Age: 36
End: 2021-05-13
Attending: ORTHOPAEDIC SURGERY
Payer: COMMERCIAL

## 2021-05-13 DIAGNOSIS — G89.29 CHRONIC PAIN OF RIGHT KNEE: Primary | ICD-10-CM

## 2021-05-13 DIAGNOSIS — M25.661 DECREASED RANGE OF MOTION (ROM) OF RIGHT KNEE: ICD-10-CM

## 2021-05-13 DIAGNOSIS — M62.81 MUSCLE WEAKNESS OF LOWER EXTREMITY: ICD-10-CM

## 2021-05-13 DIAGNOSIS — M25.561 CHRONIC PAIN OF RIGHT KNEE: Primary | ICD-10-CM

## 2021-05-13 PROCEDURE — 97112 NEUROMUSCULAR REEDUCATION: CPT | Performed by: PHYSICAL THERAPIST

## 2021-05-13 PROCEDURE — 97110 THERAPEUTIC EXERCISES: CPT | Performed by: PHYSICAL THERAPIST

## 2021-05-27 ENCOUNTER — CLINICAL SUPPORT (OUTPATIENT)
Dept: REHABILITATION | Facility: HOSPITAL | Age: 36
End: 2021-05-27
Attending: ORTHOPAEDIC SURGERY
Payer: COMMERCIAL

## 2021-05-27 DIAGNOSIS — M62.81 MUSCLE WEAKNESS OF LOWER EXTREMITY: ICD-10-CM

## 2021-05-27 DIAGNOSIS — G89.29 CHRONIC PAIN OF RIGHT KNEE: Primary | ICD-10-CM

## 2021-05-27 DIAGNOSIS — M25.661 DECREASED RANGE OF MOTION (ROM) OF RIGHT KNEE: ICD-10-CM

## 2021-05-27 DIAGNOSIS — M25.561 CHRONIC PAIN OF RIGHT KNEE: Primary | ICD-10-CM

## 2021-05-27 PROCEDURE — 97110 THERAPEUTIC EXERCISES: CPT | Performed by: PHYSICAL THERAPIST

## 2021-06-17 ENCOUNTER — CLINICAL SUPPORT (OUTPATIENT)
Dept: REHABILITATION | Facility: HOSPITAL | Age: 36
End: 2021-06-17
Attending: ORTHOPAEDIC SURGERY
Payer: COMMERCIAL

## 2021-06-17 DIAGNOSIS — M25.561 CHRONIC PAIN OF RIGHT KNEE: Primary | ICD-10-CM

## 2021-06-17 DIAGNOSIS — M62.81 MUSCLE WEAKNESS OF LOWER EXTREMITY: ICD-10-CM

## 2021-06-17 DIAGNOSIS — M25.661 DECREASED RANGE OF MOTION (ROM) OF RIGHT KNEE: ICD-10-CM

## 2021-06-17 DIAGNOSIS — G89.29 CHRONIC PAIN OF RIGHT KNEE: Primary | ICD-10-CM

## 2021-06-29 ENCOUNTER — CLINICAL SUPPORT (OUTPATIENT)
Dept: REHABILITATION | Facility: HOSPITAL | Age: 36
End: 2021-06-29
Attending: ORTHOPAEDIC SURGERY
Payer: COMMERCIAL

## 2021-06-29 DIAGNOSIS — G89.29 CHRONIC PAIN OF RIGHT KNEE: Primary | ICD-10-CM

## 2021-06-29 DIAGNOSIS — M25.661 DECREASED RANGE OF MOTION (ROM) OF RIGHT KNEE: ICD-10-CM

## 2021-06-29 DIAGNOSIS — M62.81 MUSCLE WEAKNESS OF LOWER EXTREMITY: ICD-10-CM

## 2021-06-29 DIAGNOSIS — M25.561 CHRONIC PAIN OF RIGHT KNEE: Primary | ICD-10-CM

## 2021-06-29 PROCEDURE — 97110 THERAPEUTIC EXERCISES: CPT | Performed by: PHYSICAL THERAPIST

## 2021-07-12 ENCOUNTER — CLINICAL SUPPORT (OUTPATIENT)
Dept: REHABILITATION | Facility: HOSPITAL | Age: 36
End: 2021-07-12
Attending: ORTHOPAEDIC SURGERY
Payer: COMMERCIAL

## 2021-07-12 DIAGNOSIS — M62.81 MUSCLE WEAKNESS OF LOWER EXTREMITY: ICD-10-CM

## 2021-07-12 DIAGNOSIS — M25.661 DECREASED RANGE OF MOTION (ROM) OF RIGHT KNEE: ICD-10-CM

## 2021-07-12 DIAGNOSIS — M25.561 CHRONIC PAIN OF RIGHT KNEE: Primary | ICD-10-CM

## 2021-07-12 DIAGNOSIS — G89.29 CHRONIC PAIN OF RIGHT KNEE: Primary | ICD-10-CM

## 2021-07-12 PROCEDURE — 97110 THERAPEUTIC EXERCISES: CPT | Performed by: PHYSICAL THERAPIST

## 2021-07-26 ENCOUNTER — CLINICAL SUPPORT (OUTPATIENT)
Dept: REHABILITATION | Facility: HOSPITAL | Age: 36
End: 2021-07-26
Attending: ORTHOPAEDIC SURGERY
Payer: COMMERCIAL

## 2021-07-26 DIAGNOSIS — G89.29 CHRONIC PAIN OF RIGHT KNEE: Primary | ICD-10-CM

## 2021-07-26 DIAGNOSIS — M25.661 DECREASED RANGE OF MOTION (ROM) OF RIGHT KNEE: ICD-10-CM

## 2021-07-26 DIAGNOSIS — M25.561 CHRONIC PAIN OF RIGHT KNEE: Primary | ICD-10-CM

## 2021-07-26 DIAGNOSIS — M62.81 MUSCLE WEAKNESS OF LOWER EXTREMITY: ICD-10-CM

## 2021-07-26 PROCEDURE — 97110 THERAPEUTIC EXERCISES: CPT | Performed by: PHYSICAL THERAPIST

## 2021-07-30 ENCOUNTER — PATIENT MESSAGE (OUTPATIENT)
Dept: REHABILITATION | Facility: HOSPITAL | Age: 36
End: 2021-07-30

## 2021-08-02 ENCOUNTER — TELEPHONE (OUTPATIENT)
Dept: ORTHOPEDICS | Facility: CLINIC | Age: 36
End: 2021-08-02

## 2021-08-26 ENCOUNTER — CLINICAL SUPPORT (OUTPATIENT)
Dept: REHABILITATION | Facility: HOSPITAL | Age: 36
End: 2021-08-26
Attending: PHYSICIAN ASSISTANT
Payer: COMMERCIAL

## 2021-08-26 DIAGNOSIS — M25.661 DECREASED RANGE OF MOTION (ROM) OF RIGHT KNEE: ICD-10-CM

## 2021-08-26 DIAGNOSIS — M62.81 MUSCLE WEAKNESS OF LOWER EXTREMITY: ICD-10-CM

## 2021-08-26 DIAGNOSIS — G89.29 CHRONIC PAIN OF RIGHT KNEE: Primary | ICD-10-CM

## 2021-08-26 DIAGNOSIS — M25.561 CHRONIC PAIN OF RIGHT KNEE: Primary | ICD-10-CM

## 2021-08-26 PROCEDURE — 97110 THERAPEUTIC EXERCISES: CPT | Performed by: PHYSICAL THERAPIST

## 2021-09-22 ENCOUNTER — CLINICAL SUPPORT (OUTPATIENT)
Dept: REHABILITATION | Facility: HOSPITAL | Age: 36
End: 2021-09-22
Attending: ORTHOPAEDIC SURGERY
Payer: COMMERCIAL

## 2021-09-22 DIAGNOSIS — M25.661 DECREASED RANGE OF MOTION (ROM) OF RIGHT KNEE: ICD-10-CM

## 2021-09-22 DIAGNOSIS — M25.561 CHRONIC PAIN OF RIGHT KNEE: Primary | ICD-10-CM

## 2021-09-22 DIAGNOSIS — M62.81 MUSCLE WEAKNESS OF LOWER EXTREMITY: ICD-10-CM

## 2021-09-22 DIAGNOSIS — G89.29 CHRONIC PAIN OF RIGHT KNEE: Primary | ICD-10-CM

## 2021-09-22 PROCEDURE — 97110 THERAPEUTIC EXERCISES: CPT | Performed by: PHYSICAL THERAPIST

## 2021-10-13 ENCOUNTER — CLINICAL SUPPORT (OUTPATIENT)
Dept: REHABILITATION | Facility: HOSPITAL | Age: 36
End: 2021-10-13
Attending: ORTHOPAEDIC SURGERY
Payer: COMMERCIAL

## 2021-10-13 DIAGNOSIS — G89.29 CHRONIC PAIN OF RIGHT KNEE: Primary | ICD-10-CM

## 2021-10-13 DIAGNOSIS — M25.661 DECREASED RANGE OF MOTION (ROM) OF RIGHT KNEE: ICD-10-CM

## 2021-10-13 DIAGNOSIS — M25.561 CHRONIC PAIN OF RIGHT KNEE: Primary | ICD-10-CM

## 2021-10-13 DIAGNOSIS — M62.81 MUSCLE WEAKNESS OF LOWER EXTREMITY: ICD-10-CM

## 2021-10-13 PROCEDURE — 97110 THERAPEUTIC EXERCISES: CPT | Performed by: PHYSICAL THERAPIST

## 2021-10-13 PROCEDURE — 97530 THERAPEUTIC ACTIVITIES: CPT | Performed by: PHYSICAL THERAPIST

## 2021-10-19 ENCOUNTER — PATIENT MESSAGE (OUTPATIENT)
Dept: OBSTETRICS AND GYNECOLOGY | Facility: CLINIC | Age: 36
End: 2021-10-19

## 2021-10-20 ENCOUNTER — PATIENT MESSAGE (OUTPATIENT)
Dept: OBSTETRICS AND GYNECOLOGY | Facility: CLINIC | Age: 36
End: 2021-10-20
Payer: COMMERCIAL

## 2021-10-20 RX ORDER — ACYCLOVIR 400 MG/1
TABLET ORAL
Qty: 20 TABLET | Refills: 12 | Status: SHIPPED | OUTPATIENT
Start: 2021-10-20 | End: 2023-02-10 | Stop reason: SDUPTHER

## 2021-10-27 ENCOUNTER — CLINICAL SUPPORT (OUTPATIENT)
Dept: REHABILITATION | Facility: HOSPITAL | Age: 36
End: 2021-10-27
Attending: ORTHOPAEDIC SURGERY
Payer: COMMERCIAL

## 2021-10-27 DIAGNOSIS — M25.661 DECREASED RANGE OF MOTION (ROM) OF RIGHT KNEE: ICD-10-CM

## 2021-10-27 DIAGNOSIS — G89.29 CHRONIC PAIN OF RIGHT KNEE: Primary | ICD-10-CM

## 2021-10-27 DIAGNOSIS — M25.561 CHRONIC PAIN OF RIGHT KNEE: Primary | ICD-10-CM

## 2021-10-27 DIAGNOSIS — M62.81 MUSCLE WEAKNESS OF LOWER EXTREMITY: ICD-10-CM

## 2021-10-27 PROCEDURE — 97110 THERAPEUTIC EXERCISES: CPT | Performed by: PHYSICAL THERAPIST

## 2021-11-11 ENCOUNTER — CLINICAL SUPPORT (OUTPATIENT)
Dept: REHABILITATION | Facility: HOSPITAL | Age: 36
End: 2021-11-11
Attending: ORTHOPAEDIC SURGERY
Payer: COMMERCIAL

## 2021-11-11 DIAGNOSIS — M25.561 CHRONIC PAIN OF RIGHT KNEE: Primary | ICD-10-CM

## 2021-11-11 DIAGNOSIS — M62.81 MUSCLE WEAKNESS OF LOWER EXTREMITY: ICD-10-CM

## 2021-11-11 DIAGNOSIS — M25.661 DECREASED RANGE OF MOTION (ROM) OF RIGHT KNEE: ICD-10-CM

## 2021-11-11 DIAGNOSIS — G89.29 CHRONIC PAIN OF RIGHT KNEE: Primary | ICD-10-CM

## 2021-11-11 PROCEDURE — 97110 THERAPEUTIC EXERCISES: CPT | Performed by: PHYSICAL THERAPIST

## 2021-11-24 ENCOUNTER — CLINICAL SUPPORT (OUTPATIENT)
Dept: REHABILITATION | Facility: HOSPITAL | Age: 36
End: 2021-11-24
Attending: ORTHOPAEDIC SURGERY
Payer: COMMERCIAL

## 2021-11-24 DIAGNOSIS — M62.81 MUSCLE WEAKNESS OF LOWER EXTREMITY: ICD-10-CM

## 2021-11-24 DIAGNOSIS — M25.561 CHRONIC PAIN OF RIGHT KNEE: Primary | ICD-10-CM

## 2021-11-24 DIAGNOSIS — M25.661 DECREASED RANGE OF MOTION (ROM) OF RIGHT KNEE: ICD-10-CM

## 2021-11-24 DIAGNOSIS — G89.29 CHRONIC PAIN OF RIGHT KNEE: Primary | ICD-10-CM

## 2021-11-24 PROCEDURE — 97110 THERAPEUTIC EXERCISES: CPT | Performed by: PHYSICAL THERAPIST

## 2021-12-21 ENCOUNTER — CLINICAL SUPPORT (OUTPATIENT)
Dept: REHABILITATION | Facility: HOSPITAL | Age: 36
End: 2021-12-21
Attending: ORTHOPAEDIC SURGERY
Payer: COMMERCIAL

## 2021-12-21 DIAGNOSIS — M62.81 MUSCLE WEAKNESS OF LOWER EXTREMITY: ICD-10-CM

## 2021-12-21 DIAGNOSIS — M25.561 CHRONIC PAIN OF RIGHT KNEE: Primary | ICD-10-CM

## 2021-12-21 DIAGNOSIS — G89.29 CHRONIC PAIN OF RIGHT KNEE: Primary | ICD-10-CM

## 2021-12-21 DIAGNOSIS — M25.661 DECREASED RANGE OF MOTION (ROM) OF RIGHT KNEE: ICD-10-CM

## 2021-12-21 PROCEDURE — 97110 THERAPEUTIC EXERCISES: CPT | Performed by: PHYSICAL THERAPIST

## 2022-01-11 ENCOUNTER — CLINICAL SUPPORT (OUTPATIENT)
Dept: REHABILITATION | Facility: HOSPITAL | Age: 37
End: 2022-01-11
Attending: ORTHOPAEDIC SURGERY
Payer: COMMERCIAL

## 2022-01-11 DIAGNOSIS — M62.81 MUSCLE WEAKNESS OF LOWER EXTREMITY: ICD-10-CM

## 2022-01-11 DIAGNOSIS — G89.29 CHRONIC PAIN OF RIGHT KNEE: Primary | ICD-10-CM

## 2022-01-11 DIAGNOSIS — M25.561 CHRONIC PAIN OF RIGHT KNEE: Primary | ICD-10-CM

## 2022-01-11 DIAGNOSIS — M25.661 DECREASED RANGE OF MOTION (ROM) OF RIGHT KNEE: ICD-10-CM

## 2022-01-11 PROCEDURE — 97110 THERAPEUTIC EXERCISES: CPT | Performed by: PHYSICAL THERAPIST

## 2022-01-11 NOTE — PROGRESS NOTES
Physical Therapy Daily Treatment Note      Visit Date: 1/11/2022     Name: Klarissa Brown  Clinic Number: [id number]    Therapy Diagnosis:   Muscle weakness lower extremity     Physician: Torres Villegas, *  Dr Cody     PROCEDURES(S) PERFORMED:   1. Right  Arthroscopy, anterior cruciate ligament reconstruction 95760  2.  Right  Arthroscopy, knee, synovectomy, limited 68721    GRAFT SOURCE:  Hamstring auto graft     DATE OF PROCEDURE: 10/10/2019     Physician Orders: PT Eval and Treat   Medical Diagnosis: Z98.890 (ICD-10-CM) - S/P ACL reconstruction  Evaluation Date: 11/14/2019  Authorization Period Expiration: 12/31/2019  Plan of Care Certification Period: 8/14/2020  Visit # / Visits authorized:    1/  In 2022   (19  In 2021)  (41 total)     Time In: 13:00  Time out: 14:00  Total Billable time: 45'     Precautions: Standard       Subjective     Pt reports intermittent compliance with HEP, has been walk/jogging and trying to strengthen the LE    she  Was somewhat   compliant with home exercise program given last session.   Response to previous treatment:  Good    Functional change:  See subjective above     Pain:     0/10  At rest  Location: right knee     Objective     PO approx 27  months post op     Measurements taken:   None taken this visit     (Pt unable to descend stairs without deviation, ambulation with varus thrust evident )  (MMT quad remains 4-/5, ham 4+/5)  (MMT quad 4-/5, ham 4/5)   (MMT quad 4-/5, ham 4-/5 )   ( PROM R 3/0/131  L 3/0/141  MMT quad 3+-4-/5, ham 3+-4-/5)    Patient received  therapy to increase strength  x 45'  with  activities as follows:     Foam roll full LEs  Butt winking 1x10:10  U bridge 2x5:05   Lunge sara hold 3 rounds to burn   Jog on TM x 5' 2.8 mph   B wall sit bias to R 3x:45  Mod single leg wall sit 3x:30  Seated ham curls MR 3x10 R/L   Prone ham curls MR 3x10 R/L   HS seated k' ext 90-0 U 5# 3xburn     Pt to ice at home as needed    Home Exercises Provided  and Patient Education Provided     Education provided:   - progression with exercises at gym    Written Home Exercises Provided:   Exercises were reviewed and Guilherme was able to demonstrate them prior to the end of the session.  Guilherme demonstrated good  understanding of the education provided.    See above for exercises provided this visit 12/21/2021    Assessment     Pt tolerated session well, able to perform continuous jogging which she has been unable to do     Guilherme is  progressing fair towards her goals.   Prognosis is good .    Pt will continue to benefit from skilled outpatient physical therapy to address the deficits listed in the problem list box on initial evaluation, provide pt/family education and to maximize pt's level of independence in the home and community environment.     Pt's spiritual, cultural and educational needs considered and pt agreeable to plan of care and goals.    Anticipated barriers to physical therapy: none    Goals:     Short-Term Goals:  6 weeks   - The patient will be independent with initial home exercise program. (MET)  - - The patient will increase strength by 1/2 muscle grade to perform  including squatting, lifting her daughter  with pain < 0/10  (MET).    Long-Term Goals: 16 weeks  - The patient will be independent with home exercise program and symptom management.  - The patient will be independent amb with no assistive device on all surfaces for community distances for exercise with <0/10 pain   - The patient will increase strength = to uninvolved knee  to perform stairs and recreation/leisure activities   with pain < 0/10.      Plan     Increase TE intensity at gym without increasing patellar tendon sxs    Focus on restoring  Quad/ham  Strength and balance  glute med strengthening for pelvic control    Continue with established Plan of Care towards PT goals 1x/week x 16 weeks  with focus on decreasing pain, increasing ROM, strength, neuromuscular control and  functional status       Sharan Short, PT

## 2022-02-10 ENCOUNTER — CLINICAL SUPPORT (OUTPATIENT)
Dept: REHABILITATION | Facility: HOSPITAL | Age: 37
End: 2022-02-10
Attending: ORTHOPAEDIC SURGERY
Payer: COMMERCIAL

## 2022-02-10 DIAGNOSIS — M62.81 MUSCLE WEAKNESS OF LOWER EXTREMITY: ICD-10-CM

## 2022-02-10 DIAGNOSIS — G89.29 CHRONIC PAIN OF RIGHT KNEE: Primary | ICD-10-CM

## 2022-02-10 DIAGNOSIS — M25.661 DECREASED RANGE OF MOTION (ROM) OF RIGHT KNEE: ICD-10-CM

## 2022-02-10 DIAGNOSIS — M25.561 CHRONIC PAIN OF RIGHT KNEE: Primary | ICD-10-CM

## 2022-02-10 PROCEDURE — 97750 PHYSICAL PERFORMANCE TEST: CPT | Performed by: PHYSICAL THERAPIST

## 2022-02-10 PROCEDURE — 97110 THERAPEUTIC EXERCISES: CPT | Performed by: PHYSICAL THERAPIST

## 2022-02-10 NOTE — PROGRESS NOTES
"    Physical Therapy Daily Treatment Note      Visit Date: 2/10/2022     Name: Klarissa Brown  Clinic Number: [id number]    Therapy Diagnosis:   Muscle weakness lower extremity     Physician: Torres Villegas, *  Dr Cody     PROCEDURES(S) PERFORMED:   1. Right  Arthroscopy, anterior cruciate ligament reconstruction 18451  2.  Right  Arthroscopy, knee, synovectomy, limited 52922    GRAFT SOURCE:  Hamstring auto graft     DATE OF PROCEDURE: 10/10/2019     Physician Orders: PT Eval and Treat   Medical Diagnosis: Z98.890 (ICD-10-CM) - S/P ACL reconstruction  Evaluation Date: 11/14/2019  Authorization Period Expiration: 12/31/2019  Plan of Care Certification Period: 8/14/2020  Visit # / Visits authorized:    2/  In 2022   (19  In 2021)     Time In: 13:00  Time out: 14:00  Total Billable time: 60'    Precautions: Standard       Subjective     Pt reports continued muscle weakness in R LE but has been trying to jog x 5' and go to "[employer]"     she  Was somewhat   compliant with home exercise program given last session.   Response to previous treatment:  Good    Functional change:  See subjective above     Pain:     0/10  At rest  Location: right knee     Objective     PO approx 28  months post op     Measurements taken:  Strength testing performed      (Pt unable to descend stairs without deviation, ambulation with varus thrust evident )  (MMT quad remains 4-/5, ham 4+/5)  (MMT quad 4-/5, ham 4/5)   (MMT quad 4-/5, ham 4-/5 )   ( PROM R 3/0/131  L 3/0/141  MMT quad 3+-4-/5, ham 3+-4-/5)      Patient received  therapy to increase strength  x 60'  with  activities as follows:     Bike x 5'   HSS 5x:15 R/L   Standing quad stretch 5x:15 R/L   Biodex strength testing   Biodex VST work out x 7 sets   rev'd quad / ham HEP     Pt to ice at home as needed    Home Exercises Provided and Patient Education Provided     Education provided:   - progression with exercises at gym    Written Home Exercises Provided: "   Exercises were reviewed and Guilherme was able to demonstrate them prior to the end of the session.  Guilherme demonstrated good  understanding of the education provided.    See above for exercises provided this visit 2/10/2022    Assessment     Pt tolerated testing and session well, Biodex results:  Strength quad  -16%, ham -24^, endurance quad -14% and ham -19%  Pt demonstrated significant improvement in quad and ham strength and endurance but still limited in hamstring due to hamstring autograft     Guilherme is  progressing fair towards her goals.   Prognosis is good .    Pt will continue to benefit from skilled outpatient physical therapy to address the deficits listed in the problem list box on initial evaluation, provide pt/family education and to maximize pt's level of independence in the home and community environment.     Pt's spiritual, cultural and educational needs considered and pt agreeable to plan of care and goals.    Anticipated barriers to physical therapy: none    Goals:     Short-Term Goals:  6 weeks   - The patient will be independent with initial home exercise program. (MET)  - - The patient will increase strength by 1/2 muscle grade to perform  including squatting, lifting her daughter  with pain < 0/10  (MET).    Long-Term Goals: 16 weeks  - The patient will be independent with home exercise program and symptom management.  - The patient will be independent amb with no assistive device on all surfaces for community distances for exercise with <0/10 pain   - The patient will increase strength = to uninvolved knee  to perform stairs and recreation/leisure activities   with pain < 0/10.      Plan     Pt and PT agree that pt can continue with HEP strengthening on her own and will return in one month to reassess and if hamstring is strong vs L LE, will begin functional exercise progression     Continue with established Plan of Care towards PT goals 1x/week x 16 weeks  with focus on decreasing  pain, increasing ROM, strength, neuromuscular control and functional status       Sharan Short, PT

## 2022-03-10 ENCOUNTER — CLINICAL SUPPORT (OUTPATIENT)
Dept: REHABILITATION | Facility: HOSPITAL | Age: 37
End: 2022-03-10
Attending: ORTHOPAEDIC SURGERY
Payer: COMMERCIAL

## 2022-03-10 DIAGNOSIS — M25.661 DECREASED RANGE OF MOTION (ROM) OF RIGHT KNEE: ICD-10-CM

## 2022-03-10 DIAGNOSIS — G89.29 CHRONIC PAIN OF RIGHT KNEE: Primary | ICD-10-CM

## 2022-03-10 DIAGNOSIS — M25.561 CHRONIC PAIN OF RIGHT KNEE: Primary | ICD-10-CM

## 2022-03-10 DIAGNOSIS — M62.81 MUSCLE WEAKNESS OF LOWER EXTREMITY: ICD-10-CM

## 2022-03-10 PROCEDURE — 97110 THERAPEUTIC EXERCISES: CPT | Performed by: PHYSICAL THERAPIST

## 2022-03-10 PROCEDURE — 97530 THERAPEUTIC ACTIVITIES: CPT | Performed by: PHYSICAL THERAPIST

## 2022-03-10 NOTE — PROGRESS NOTES
Physical Therapy Daily Treatment Note      Visit Date: 3/10/2022     Name: Klarissa Brown  Clinic Number: [id number]    Therapy Diagnosis:   Muscle weakness lower extremity     Physician: Torres Villegas, *  Dr Cody     PROCEDURES(S) PERFORMED:   1. Right  Arthroscopy, anterior cruciate ligament reconstruction 51444  2.  Right  Arthroscopy, knee, synovectomy, limited 89121    GRAFT SOURCE:  Hamstring auto graft     DATE OF PROCEDURE: 10/10/2019     Physician Orders: PT Eval and Treat   Medical Diagnosis: Z98.890 (ICD-10-CM) - S/P ACL reconstruction  Evaluation Date: 11/14/2019  Authorization Period Expiration: 12/31/2019  Plan of Care Certification Period: 8/14/2020  Visit # / Visits authorized:    3/  In 2022   (19  In 2021)     Time In: 13:00  Time out: 14:00  Total Billable time: 45'    Precautions: Standard       Subjective     Pt reports jogging x 5' and was able to walk at [employer] without any pain in R knee     she  Was somewhat   compliant with home exercise program given last session.   Response to previous treatment:  Good    Functional change:  See subjective above     Pain:     0/10  At rest  Location: right knee     Objective     PO approx 29  months post op     Measurements taken:  MMT hams 4+/5 at 30 deg and 3-/5 at 90 deg     (Pt unable to descend stairs without deviation, ambulation with varus thrust evident )  (MMT quad remains 4-/5, ham 4+/5)  (MMT quad 4-/5, ham 4/5)   (MMT quad 4-/5, ham 4-/5 )   ( PROM R 3/0/131  L 3/0/141  MMT quad 3+-4-/5, ham 3+-4-/5)    Patient participated in dynamic functional therapeutic activities to improve functional performance for  30 minutes. Including:     Jog on turf with biomechanical instruction and use of metronome at [address]  Build ups 2x4 x 50% x 10 yds     Patient received  therapy to increase strength  x 15'  with  activities as follows:     Supine SLR 1x20:05 2.5#  HS seated k' ext 3xburn 90-0 U   Russian hams 3x7 cook band assist      Pt to ice at home as needed    Home Exercises Provided and Patient Education Provided     Education provided:   - progression with exercises at gym    Written Home Exercises Provided:   Exercises were reviewed and Guilherme was able to demonstrate them prior to the end of the session.  Guilherme demonstrated good  understanding of the education provided.    See above for exercises provided this visit  3/10/2022 to increase jogging time and can work on build ups at track     Assessment     michael Rx without increase in sxs, running improved following muscle activation and repetition     (Pt tolerated testing and session well, Biodex results:  Strength quad  -16%, ham -24^, endurance quad -14% and ham -19%)  Pt demonstrated significant improvement in quad and ham strength and endurance but still limited in hamstring due to hamstring autograft     Guilherme is  progressing fair towards her goals.   Prognosis is good .    Pt will continue to benefit from skilled outpatient physical therapy to address the deficits listed in the problem list box on initial evaluation, provide pt/family education and to maximize pt's level of independence in the home and community environment.     Pt's spiritual, cultural and educational needs considered and pt agreeable to plan of care and goals.    Anticipated barriers to physical therapy: none    Goals:     Short-Term Goals:  6 weeks   - The patient will be independent with initial home exercise program. (MET)  - - The patient will increase strength by 1/2 muscle grade to perform  including squatting, lifting her daughter  with pain < 0/10  (MET).    Long-Term Goals: 16 weeks  - The patient will be independent with home exercise program and symptom management.  - The patient will be independent amb with no assistive device on all surfaces for community distances for exercise with <0/10 pain   - The patient will increase strength = to uninvolved knee  to perform stairs and  recreation/leisure activities   with pain < 0/10.      Plan     Pt and PT agree that pt can continue with HEP strengthening on her own and will return in one month to reassess and if hamstring is strong vs L LE, will continue functional exercise progression     Continue with established Plan of Care towards PT goals 1x/week x 16 weeks  with focus on decreasing pain, increasing ROM, strength, neuromuscular control and functional status       Sharan Short, PT

## 2022-04-26 ENCOUNTER — CLINICAL SUPPORT (OUTPATIENT)
Dept: REHABILITATION | Facility: HOSPITAL | Age: 37
End: 2022-04-26
Attending: ORTHOPAEDIC SURGERY
Payer: COMMERCIAL

## 2022-04-26 DIAGNOSIS — M62.81 MUSCLE WEAKNESS OF LOWER EXTREMITY: ICD-10-CM

## 2022-04-26 DIAGNOSIS — M25.561 CHRONIC PAIN OF RIGHT KNEE: Primary | ICD-10-CM

## 2022-04-26 DIAGNOSIS — M25.661 DECREASED RANGE OF MOTION (ROM) OF RIGHT KNEE: ICD-10-CM

## 2022-04-26 DIAGNOSIS — G89.29 CHRONIC PAIN OF RIGHT KNEE: Primary | ICD-10-CM

## 2022-04-26 PROCEDURE — 97110 THERAPEUTIC EXERCISES: CPT | Performed by: PHYSICAL THERAPIST

## 2022-04-26 PROCEDURE — 97530 THERAPEUTIC ACTIVITIES: CPT | Performed by: PHYSICAL THERAPIST

## 2022-04-26 NOTE — PROGRESS NOTES
"    Physical Therapy Daily Treatment Note      Visit Date: 4/26/2022     Name: Klarissa rBown  Clinic Number: [id number]    Therapy Diagnosis:   Muscle weakness lower extremity     Physician: Torres Villegas, *  Dr Cody     PROCEDURES(S) PERFORMED:   1. Right  Arthroscopy, anterior cruciate ligament reconstruction 86162  2.  Right  Arthroscopy, knee, synovectomy, limited 20298    GRAFT SOURCE:  Hamstring auto graft     DATE OF PROCEDURE: 10/10/2019     Physician Orders: PT Eval and Treat   Medical Diagnosis: Z98.890 (ICD-10-CM) - S/P ACL reconstruction  Evaluation Date: 11/14/2019  Authorization Period Expiration: 12/31/2019  Plan of Care Certification Period: 8/14/2020  Visit # / Visits authorized:    4/  In 2022   (19  In 2021)     Time In: 13:00  Time out: 14:00  Total Billable time: 45'    Precautions: Standard       Subjective     Pt reports shruthi Covid and did not do as much at gym as she would have like but willing to attempt Robyn as tolerated     she  Was somewhat   compliant with home exercise program given last session.   Response to previous treatment:  Good    Functional change:  See subjective above     Pain:     0/10  At rest  Location: right knee     Objective     PO approx 29  months post op     Measurements taken: 3_/5 ham at 90 deg     ( MMT hams 4+/5 at 30 deg and 3-/5 at 90 deg )  (Pt unable to descend stairs without deviation, ambulation with varus thrust evident )  (MMT quad remains 4-/5, ham 4+/5)  (MMT quad 4-/5, ham 4/5)   (MMT quad 4-/5, ham 4-/5 )   ( PROM R 3/0/131  L 3/0/141  MMT quad 3+-4-/5, ham 3+-4-/5)    Patient participated in dynamic functional therapeutic activities to improve functional performance for 15  minutes. Including:     B hop in place 2x20  B hop FW/BW 2x5  B hop side to side 2x5  vball bump x 5'     Patient received  therapy to increase strength  x 30'  with  activities as follows:     Prone ham curls MR 90-60 deg 3x15  Seated ham curls "  "  " 3x10  Biodex ecc hams 4x10  y-balance FW, PL, PM 2x5 R/L     Pt to ice at home as needed    Home Exercises Provided and Patient Education Provided     Education provided:   - progression with exercises at gym    Written Home Exercises Provided:   Exercises were reviewed and Guilherme was able to demonstrate them prior to the end of the session.  Guilherme demonstrated good  understanding of the education provided.    See above for exercises provided this visit  4/26/2022 for hamstring strengthening at 90 deg     Assessment     michael Rx without increase in sxs, pt demonstrated slight improvement in ham strength but still limited at 90 deg, improved balance and jumping/landing     (Pt tolerated testing and session well, Biodex results:  Strength quad  -16%, ham -24^, endurance quad -14% and ham -19%)  Pt demonstrated significant improvement in quad and ham strength and endurance but still limited in hamstring due to hamstring autograft     Guilherme is  progressing fair towards her goals.   Prognosis is good .    Pt will continue to benefit from skilled outpatient physical therapy to address the deficits listed in the problem list box on initial evaluation, provide pt/family education and to maximize pt's level of independence in the home and community environment.     Pt's spiritual, cultural and educational needs considered and pt agreeable to plan of care and goals.    Anticipated barriers to physical therapy: none    Goals:     Short-Term Goals:  6 weeks   - The patient will be independent with initial home exercise program. (MET)  - - The patient will increase strength by 1/2 muscle grade to perform  including squatting, lifting her daughter  with pain < 0/10  (MET).    Long-Term Goals: 16 weeks  - The patient will be independent with home exercise program and symptom management.  - The patient will be independent amb with no assistive device on all surfaces for community distances for exercise with <0/10 pain   -  The patient will increase strength = to uninvolved knee  to perform stairs and recreation/leisure activities   with pain < 0/10.      Plan     Pt and PT agree that pt can continue with HEP strengthening on her own and will return in one month to reassess and if hamstring is strong vs L LE, will continue functional exercise progression     Continue with established Plan of Care towards PT goals 1x/week x 16 weeks  with focus on decreasing pain, increasing ROM, strength, neuromuscular control and functional status       Sharan Short, PT

## 2022-05-11 ENCOUNTER — OFFICE VISIT (OUTPATIENT)
Dept: INTERNAL MEDICINE | Facility: CLINIC | Age: 37
End: 2022-05-11
Payer: COMMERCIAL

## 2022-05-11 DIAGNOSIS — J32.9 SINUSITIS, UNSPECIFIED CHRONICITY, UNSPECIFIED LOCATION: Primary | ICD-10-CM

## 2022-05-11 PROCEDURE — 1159F MED LIST DOCD IN RCRD: CPT | Mod: CPTII,95,, | Performed by: INTERNAL MEDICINE

## 2022-05-11 PROCEDURE — 99214 OFFICE O/P EST MOD 30 MIN: CPT | Mod: 95,,, | Performed by: INTERNAL MEDICINE

## 2022-05-11 PROCEDURE — 99214 PR OFFICE/OUTPT VISIT, EST, LEVL IV, 30-39 MIN: ICD-10-PCS | Mod: 95,,, | Performed by: INTERNAL MEDICINE

## 2022-05-11 PROCEDURE — 1159F PR MEDICATION LIST DOCUMENTED IN MEDICAL RECORD: ICD-10-PCS | Mod: CPTII,95,, | Performed by: INTERNAL MEDICINE

## 2022-05-11 RX ORDER — FLUCONAZOLE 150 MG/1
TABLET ORAL
Qty: 2 TABLET | Refills: 0 | Status: SHIPPED | OUTPATIENT
Start: 2022-05-11

## 2022-05-11 RX ORDER — AZELASTINE 1 MG/ML
1 SPRAY, METERED NASAL 2 TIMES DAILY
Qty: 30 ML | Refills: 2 | Status: SHIPPED | OUTPATIENT
Start: 2022-05-11 | End: 2023-01-30

## 2022-05-11 RX ORDER — AMOXICILLIN AND CLAVULANATE POTASSIUM 875; 125 MG/1; MG/1
1 TABLET, FILM COATED ORAL EVERY 12 HOURS
Qty: 20 TABLET | Refills: 0 | Status: SHIPPED | OUTPATIENT
Start: 2022-05-11 | End: 2022-12-15

## 2022-05-11 NOTE — PROGRESS NOTES
"  [facility] Virtual Visit Note    The patient location is: [place]  The chief complaint leading to consultation is: sinusitis  Visit type: Virtual visit with synchronous audio and video  Total time spent with patient: 20 min  Each patient to whom he or she provides medical services by telemedicine is:  (1) informed of the relationship between the physician and patient and the respective role of any other health care provider with respect to management of the patient; and (2) notified that he or she may decline to receive medical services by telemedicine and may withdraw from such care at any time.      Chief Complaint      Chief Complaint   Patient presents with    Sinus Problem     Mucus for 2 weeks, sinus pressure. Went to  negative for COVID, strap.        History of Present Illness      Klarissa Brown is a 37 y.o. female with chronic conditions of  who presents today for: Complaints of 2 weeks of sinusitis. Went to urgent care, swabbed negative for COVID, strep. Denies fevers, chills.  Has been taking flonase 2 sprays each nostril and mucinex.          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    Family History:  family history includes Anxiety disorder in her mother; Arthritis in her mother; Bipolar disorder in her maternal grandmother; Depression in her mother; Hypertension in her father; Other in her brother; Sleep apnea in her mother.     Social History:  Social History     Tobacco Use    " Smoking status: Never Smoker    Smokeless tobacco: Never Used   Substance Use Topics    Alcohol use: Yes     Alcohol/week: 2.0 standard drinks     Types: 2 Glasses of wine per week     Comment: 3-5 drinks/week    Drug use: No       Review of Systems   Constitutional: Negative for chills, fever and malaise/fatigue.   HENT: Negative for hearing loss.    Eyes: Negative for discharge.   Respiratory: Negative for shortness of breath and wheezing.    Cardiovascular: Negative for chest pain and palpitations.   Gastrointestinal: Negative for blood in stool, constipation, diarrhea, nausea and vomiting.   Genitourinary: Negative for dysuria and hematuria.   Musculoskeletal: Negative for neck pain.   Skin: Negative for rash.   Neurological: Negative for weakness and headaches.   Endo/Heme/Allergies: Negative for polydipsia.   All other systems reviewed and are negative.       Medications:  Outpatient Encounter Medications as of 5/11/2022   Medication Sig Note Dispense Refill    acyclovir (ZOVIRAX) 400 MG tablet TAKE 1 TABLET (400 MG TOTAL) BY MOUTH 4 (FOUR) TIMES DAILY. FOR 5 DAYS (Patient not taking: Reported on 5/11/2022)  20 tablet 12    amoxicillin-clavulanate 875-125mg (AUGMENTIN) 875-125 mg per tablet Take 1 tablet by mouth every 12 (twelve) hours.  20 tablet 0    azelastine (ASTELIN) 137 mcg (0.1 %) nasal spray 1 spray (137 mcg total) by Nasal route 2 (two) times daily.  30 mL 2    COVID-19 vacc,mRNA,Moderna,/PF (MODERNA COVID-19 VACCINE, EUA, IM)        COVID-19 vacc,mRNA,Moderna,/PF (MODERNA COVID-19 VACCINE, EUA, IM)        fluconazole (DIFLUCAN) 150 MG Tab Take 1 tablet by mouth once on day 1 and day 3  2 tablet 0    prenatal vit,calc76/iron/folic (PNV 29-1 ORAL) Take 1 tablet by mouth once daily. 1/2/2020: OTC       Facility-Administered Encounter Medications as of 5/11/2022   Medication Dose Route Frequency Provider Last Rate Last Admin    levonorgestreL 20 mcg/24 hours (6 yrs) 52 mg IUD 1 Intra Uterine  Device  1 Intra Uterine Device Intrauterine  Shauna Gilliland MD   1 Intra Uterine Device at 02/03/21 1600       Allergies:  Review of patient's allergies indicates:  No Known Allergies    Health Maintenance:  Immunization History   Administered Date(s) Administered    COVID-19 Vaccine 03/06/2021    COVID-19, MRNA, LN-S, PF (MODERNA FULL 0.5 ML DOSE) 02/02/2021, 03/06/2021    Influenza - Quadrivalent - PF *Preferred* (6 months and older) 01/02/2020, 10/30/2020    Tdap 05/19/2020      Health Maintenance   Topic Date Due    TETANUS VACCINE  05/19/2030    Hepatitis C Screening  Completed    Lipid Panel  Completed        Physical Exam       ]    Physical Exam  Constitutional:       General: She is not in acute distress.     Appearance: She is well-developed. She is not diaphoretic.   Eyes:      General: No scleral icterus.        Right eye: No discharge.         Left eye: No discharge.      Conjunctiva/sclera: Conjunctivae normal.   Pulmonary:      Effort: Pulmonary effort is normal. No respiratory distress.   Neurological:      Mental Status: She is alert and oriented to person, place, and time.   Psychiatric:         Behavior: Behavior normal.         Thought Content: Thought content normal.         Judgment: Judgment normal.          Laboratory:  CBC:  Recent Labs   Lab 01/09/20  0917 05/19/20  0942 10/30/20  1355   WBC 5.53 11.44 8.31   RBC 4.58 4.09 4.36   Hemoglobin 12.9 11.6 L 11.9 L   Hematocrit 40.5 35.6 L 39.1   Platelets 256 264 325   MCV 88 87 90   MCH 28.2 28.4 27.3   MCHC 31.9 L 32.6 30.4 L     CMP:  Recent Labs   Lab 10/30/20  1355   Glucose 93   Calcium 8.9   Albumin 4.3   Total Protein 7.2   Sodium 139   Potassium 4.2   CO2 27   Chloride 104   BUN 15   Alkaline Phosphatase 82   ALT 13   AST 16   Total Bilirubin 0.2     URINALYSIS:       LIPIDS:      TSH:      A1C:        Assessment/Plan     Klarissa Bentley Kevin is a 37 y.o.female pt of Dr. Cook with:    1. Sinusitis, unspecified chronicity,  unspecified location  - azelastine (ASTELIN) 137 mcg (0.1 %) nasal spray; 1 spray (137 mcg total) by Nasal route 2 (two) times daily.  Dispense: 30 mL; Refill: 2  - amoxicillin-clavulanate 875-125mg (AUGMENTIN) 875-125 mg per tablet; Take 1 tablet by mouth every 12 (twelve) hours.  Dispense: 20 tablet; Refill: 0  - fluconazole (DIFLUCAN) 150 MG Tab; Take 1 tablet by mouth once on day 1 and day 3  Dispense: 2 tablet; Refill: 0  Symptoms 14 days with past few days of worsening.  Sending in augmentin for likely bacterial sinusitis.  Also sending in diflucan to fill if develops yeast infection after abx.  Start azelastine in addition to flonase.  Discussed nasal saline irrigation if symptoms are slow to improve.      Future Appointments   Date Time Provider Department Center   5/19/2022  1:00 PM Sharan Short Jr., PT [facility] OP [facility] [place]       Hill Ambriz MD  [facility]                Answers for HPI/ROS submitted by the patient on 5/11/2022  activity change: No  unexpected weight change: No  rhinorrhea: Yes  trouble swallowing: No  visual disturbance: No  chest tightness: No  polyuria: No  difficulty urinating: No  menstrual problem: No  joint swelling: No  arthralgias: No  confusion: No  dysphoric mood: No

## 2022-05-19 ENCOUNTER — CLINICAL SUPPORT (OUTPATIENT)
Dept: REHABILITATION | Facility: HOSPITAL | Age: 37
End: 2022-05-19
Attending: ORTHOPAEDIC SURGERY
Payer: COMMERCIAL

## 2022-05-19 DIAGNOSIS — M25.661 DECREASED RANGE OF MOTION (ROM) OF RIGHT KNEE: ICD-10-CM

## 2022-05-19 DIAGNOSIS — G89.29 CHRONIC PAIN OF RIGHT KNEE: Primary | ICD-10-CM

## 2022-05-19 DIAGNOSIS — M62.81 MUSCLE WEAKNESS OF LOWER EXTREMITY: ICD-10-CM

## 2022-05-19 DIAGNOSIS — M25.561 CHRONIC PAIN OF RIGHT KNEE: Primary | ICD-10-CM

## 2022-05-19 PROCEDURE — 97530 THERAPEUTIC ACTIVITIES: CPT | Performed by: PHYSICAL THERAPIST

## 2022-05-20 NOTE — PROGRESS NOTES
"    Physical Therapy Daily Treatment Note      Visit Date: 5/19/2022     Name: Klarissa Brown  Clinic Number: [id number]    Therapy Diagnosis:   Muscle weakness lower extremity     Physician: Torres Villegas, *  Dr Cody     PROCEDURES(S) PERFORMED:   1. Right  Arthroscopy, anterior cruciate ligament reconstruction 98897  2.  Right  Arthroscopy, knee, synovectomy, limited 12536    GRAFT SOURCE:  Hamstring auto graft     DATE OF PROCEDURE: 10/10/2019     Physician Orders: PT Eval and Treat   Medical Diagnosis: Z98.890 (ICD-10-CM) - S/P ACL reconstruction  Evaluation Date: 11/14/2019  Authorization Period Expiration: 12/31/2019  Plan of Care Certification Period: 8/14/2020  Visit # / Visits authorized:    5/  In 2022   (19  In 2021)     Time In: 13:00  Time out: 14:00  Total Billable time: 45'    Precautions: Standard       Subjective     Pt reports getting to the gym a few times since last PT visit, was able to do the prone ham curl    she  Was somewhat   compliant with home exercise program given last session.   Response to previous treatment:  Good    Functional change:  See subjective above     Pain:     0/10  At rest  Location: right knee     Objective     PO approx 30 months post op     Measurements taken: 4/5 ham at 90 deg (improved)    ( MMT hams 4+/5 at 30 deg and 3-/5 at 90 deg )  (Pt unable to descend stairs without deviation, ambulation with varus thrust evident )  (MMT quad remains 4-/5, ham 4+/5)  (MMT quad 4-/5, ham 4/5)   (MMT quad 4-/5, ham 4-/5 )   ( PROM R 3/0/131  L 3/0/141  MMT quad 3+-4-/5, ham 3+-4-/5)    Patient participated in dynamic functional therapeutic activities to improve functional performance for 45  minutes. Including:     Dynamic mobility program for LEs  Dynamic flexibility program for LEs  B hop stick 2x5 + hip abd   Broad jump stick 2x5 + hip abd  Power hop 12" 2x6 and 18" 2x4   90 deg hop stick 2x3 CW and CCW  U step and hold 2x5 R/L      Pt to ice at home as " needed    Home Exercises Provided and Patient Education Provided     Education provided:   - progression with exercises at gym    Written Home Exercises Provided:   Exercises were reviewed and Guilherme was able to demonstrate them prior to the end of the session.  Guilherme demonstrated good  understanding of the education provided.    See above for exercises provided this visit  4/26/2022 for hamstring strengthening at 90 deg     Assessment     michael Rx without increase in sxs, pt demonstrated significant improvement in ham strength and jumping and landing ability,  Did demonstrate min valgus collapse on R but overall improved athletic performance      (Pt tolerated testing and session well, Biodex results:  Strength quad  -16%, ham -24^, endurance quad -14% and ham -19%)  Pt demonstrated significant improvement in quad and ham strength and endurance but still limited in hamstring due to hamstring autograft     Guilherme is  progressing fair towards her goals.   Prognosis is good .    Pt will continue to benefit from skilled outpatient physical therapy to address the deficits listed in the problem list box on initial evaluation, provide pt/family education and to maximize pt's level of independence in the home and community environment.     Pt's spiritual, cultural and educational needs considered and pt agreeable to plan of care and goals.    Anticipated barriers to physical therapy: none    Goals:     Short-Term Goals:  6 weeks   - The patient will be independent with initial home exercise program. (MET)  - - The patient will increase strength by 1/2 muscle grade to perform  including squatting, lifting her daughter  with pain < 0/10  (MET).    Long-Term Goals: 16 weeks  - The patient will be independent with home exercise program and symptom management.  - The patient will be independent amb with no assistive device on all surfaces for community distances for exercise with <0/10 pain   - The patient will increase  strength = to uninvolved knee  to perform stairs and recreation/leisure activities   with pain < 0/10.      Plan     Work toward passing Sports Test and then DC to HEP     Pt and PT agree that pt can continue with HEP strengthening on her own and will return in one month to reassess and if hamstring is strong vs L LE, will continue functional exercise progression     Continue with established Plan of Care towards PT goals 1x/week x 16 weeks  with focus on decreasing pain, increasing ROM, strength, neuromuscular control and functional status       Sharan Short, PT

## 2022-06-23 ENCOUNTER — CLINICAL SUPPORT (OUTPATIENT)
Dept: REHABILITATION | Facility: HOSPITAL | Age: 37
End: 2022-06-23
Attending: ORTHOPAEDIC SURGERY
Payer: COMMERCIAL

## 2022-06-23 DIAGNOSIS — M25.661 DECREASED RANGE OF MOTION (ROM) OF RIGHT KNEE: ICD-10-CM

## 2022-06-23 DIAGNOSIS — M62.81 MUSCLE WEAKNESS OF LOWER EXTREMITY: ICD-10-CM

## 2022-06-23 DIAGNOSIS — G89.29 CHRONIC PAIN OF RIGHT KNEE: Primary | ICD-10-CM

## 2022-06-23 DIAGNOSIS — M25.561 CHRONIC PAIN OF RIGHT KNEE: Primary | ICD-10-CM

## 2022-06-23 PROCEDURE — 97530 THERAPEUTIC ACTIVITIES: CPT | Performed by: PHYSICAL THERAPIST

## 2022-06-23 NOTE — PROGRESS NOTES
"    Physical Therapy Daily Treatment Note      Visit Date: 6/23/2022     Name: Klarissa Brown  Clinic Number: [id number]    Therapy Diagnosis:   Muscle weakness lower extremity     Physician: Torres Villegas, *  Dr Cody     PROCEDURES(S) PERFORMED:   1. Right  Arthroscopy, anterior cruciate ligament reconstruction 08190  2.  Right  Arthroscopy, knee, synovectomy, limited 18771    GRAFT SOURCE:  Hamstring auto graft     DATE OF PROCEDURE: 10/10/2019     Physician Orders: PT Eval and Treat   Medical Diagnosis: Z98.890 (ICD-10-CM) - S/P ACL reconstruction  Evaluation Date: 11/14/2019  Authorization Period Expiration: 12/31/2019  Plan of Care Certification Period: 8/14/2020  Visit # / Visits authorized:    6/  In 2022   (19  In 2021)     Time In: 13:00  Time out: 14:00  Total Billable time: 45'    Precautions: Standard       Subjective     Pt reports that she has returned to cross fit with some modification but doing well, still going to gym 1x/week to increase isolated quad / ham strength     she  Was somewhat   compliant with home exercise program given last session.   Response to previous treatment:  Good    Functional change:  See subjective above     Pain:     0/10  At rest  Location: right knee     Objective     PO approx 31 months post op     Measurements taken: none taken this visit     (4/5 ham at 90 deg)  ( MMT hams 4+/5 at 30 deg and 3-/5 at 90 deg )  (Pt unable to descend stairs without deviation, ambulation with varus thrust evident )  (MMT quad remains 4-/5, ham 4+/5)  (MMT quad 4-/5, ham 4/5)   (MMT quad 4-/5, ham 4-/5 )   ( PROM R 3/0/131  L 3/0/141  MMT quad 3+-4-/5, ham 3+-4-/5)    Patient participated in dynamic functional therapeutic activities to improve functional performance for 45  minutes. Including:     Dynamic mobility program for LEs  Dynamic flexibility program for LEs  Segmental rolling series 1x5 4D   B jump rope 3x:30   Power hop 12" 2x6  Slide disc 1x10 R/L lunge  Push sled 2 " rounds   Pull sled 2 rounds     Home Exercises Provided and Patient Education Provided     Education provided:   - progression with exercises at gym    Written Home Exercises Provided:   Exercises were reviewed and Guilherme was able to demonstrate them prior to the end of the session.  Guilherme demonstrated good  understanding of the education provided.    See above for exercises provided prior visit     Assessment     michael Rx without increase in sxs, all short and majority of long term goals achieved, pt has not completed single leg jumping portion of functional exercise progression     (Pt tolerated testing and session well, Biodex results:  Strength quad  -16%, ham -24^, endurance quad -14% and ham -19%)  Pt demonstrated significant improvement in quad and ham strength and endurance but still limited in hamstring due to hamstring autograft     Guilherme is  progressing fair towards her goals.   Prognosis is good .    Pt will continue to benefit from skilled outpatient physical therapy to address the deficits listed in the problem list box on initial evaluation, provide pt/family education and to maximize pt's level of independence in the home and community environment.     Pt's spiritual, cultural and educational needs considered and pt agreeable to plan of care and goals.    Anticipated barriers to physical therapy: none    Goals:     Short-Term Goals:  6 weeks   - The patient will be independent with initial home exercise program. (MET)  - - The patient will increase strength by 1/2 muscle grade to perform  including squatting, lifting her daughter  with pain < 0/10  (MET).    Long-Term Goals: 16 weeks (MET or moving toward achievement)   - The patient will be independent with home exercise program and symptom management.  - The patient will be independent amb with no assistive device on all surfaces for community distances for exercise with <0/10 pain   - The patient will increase strength = to uninvolved knee   to perform stairs and recreation/leisure activities   with pain < 0/10.      Plan     Pt to continue at cross fit and gym x 4-6 weeks and increase her strength and function and may return to complete functional exercise progression in terms of sprinting and single leg jumping if appropriate   Hold PT at this time       Sharan Short, PT

## 2022-10-20 ENCOUNTER — PATIENT MESSAGE (OUTPATIENT)
Dept: INTERNAL MEDICINE | Facility: CLINIC | Age: 37
End: 2022-10-20

## 2022-10-20 ENCOUNTER — LAB VISIT (OUTPATIENT)
Dept: LAB | Facility: HOSPITAL | Age: 37
End: 2022-10-20
Payer: COMMERCIAL

## 2022-10-20 ENCOUNTER — OFFICE VISIT (OUTPATIENT)
Dept: INTERNAL MEDICINE | Facility: CLINIC | Age: 37
End: 2022-10-20
Payer: COMMERCIAL

## 2022-10-20 VITALS
WEIGHT: 212.5 LBS | SYSTOLIC BLOOD PRESSURE: 112 MMHG | OXYGEN SATURATION: 99 % | DIASTOLIC BLOOD PRESSURE: 78 MMHG | BODY MASS INDEX: 33.35 KG/M2 | HEIGHT: 67 IN | HEART RATE: 76 BPM

## 2022-10-20 DIAGNOSIS — Z00.00 ANNUAL PHYSICAL EXAM: Primary | ICD-10-CM

## 2022-10-20 DIAGNOSIS — Z00.00 ANNUAL PHYSICAL EXAM: ICD-10-CM

## 2022-10-20 LAB
ALBUMIN SERPL BCP-MCNC: 4.3 G/DL (ref 3.5–5.2)
ALP SERPL-CCNC: 61 U/L (ref 55–135)
ALT SERPL W/O P-5'-P-CCNC: 18 U/L (ref 10–44)
ANION GAP SERPL CALC-SCNC: 9 MMOL/L (ref 8–16)
AST SERPL-CCNC: 18 U/L (ref 10–40)
BASOPHILS # BLD AUTO: 0.1 K/UL (ref 0–0.2)
BASOPHILS NFR BLD: 1.2 % (ref 0–1.9)
BILIRUB SERPL-MCNC: 0.3 MG/DL (ref 0.1–1)
BUN SERPL-MCNC: 13 MG/DL (ref 6–20)
CALCIUM SERPL-MCNC: 9.5 MG/DL (ref 8.7–10.5)
CHLORIDE SERPL-SCNC: 105 MMOL/L (ref 95–110)
CHOLEST SERPL-MCNC: 136 MG/DL (ref 120–199)
CHOLEST/HDLC SERPL: 3 {RATIO} (ref 2–5)
CO2 SERPL-SCNC: 23 MMOL/L (ref 23–29)
CREAT SERPL-MCNC: 0.8 MG/DL (ref 0.5–1.4)
DIFFERENTIAL METHOD: NORMAL
EOSINOPHIL # BLD AUTO: 0.1 K/UL (ref 0–0.5)
EOSINOPHIL NFR BLD: 0.9 % (ref 0–8)
ERYTHROCYTE [DISTWIDTH] IN BLOOD BY AUTOMATED COUNT: 12.4 % (ref 11.5–14.5)
EST. GFR  (NO RACE VARIABLE): >60 ML/MIN/1.73 M^2
ESTIMATED AVG GLUCOSE: 111 MG/DL (ref 68–131)
GLUCOSE SERPL-MCNC: 82 MG/DL (ref 70–110)
HBA1C MFR BLD: 5.5 % (ref 4–5.6)
HCT VFR BLD AUTO: 40.2 % (ref 37–48.5)
HDLC SERPL-MCNC: 45 MG/DL (ref 40–75)
HDLC SERPL: 33.1 % (ref 20–50)
HGB BLD-MCNC: 13.2 G/DL (ref 12–16)
IMM GRANULOCYTES # BLD AUTO: 0.03 K/UL (ref 0–0.04)
IMM GRANULOCYTES NFR BLD AUTO: 0.4 % (ref 0–0.5)
LDLC SERPL CALC-MCNC: 74.6 MG/DL (ref 63–159)
LYMPHOCYTES # BLD AUTO: 3.1 K/UL (ref 1–4.8)
LYMPHOCYTES NFR BLD: 36.6 % (ref 18–48)
MCH RBC QN AUTO: 28 PG (ref 27–31)
MCHC RBC AUTO-ENTMCNC: 32.8 G/DL (ref 32–36)
MCV RBC AUTO: 85 FL (ref 82–98)
MONOCYTES # BLD AUTO: 0.5 K/UL (ref 0.3–1)
MONOCYTES NFR BLD: 5.8 % (ref 4–15)
NEUTROPHILS # BLD AUTO: 4.7 K/UL (ref 1.8–7.7)
NEUTROPHILS NFR BLD: 55.1 % (ref 38–73)
NONHDLC SERPL-MCNC: 91 MG/DL
NRBC BLD-RTO: 0 /100 WBC
PLATELET # BLD AUTO: 344 K/UL (ref 150–450)
PMV BLD AUTO: 10.3 FL (ref 9.2–12.9)
POTASSIUM SERPL-SCNC: 4.7 MMOL/L (ref 3.5–5.1)
PROT SERPL-MCNC: 7.5 G/DL (ref 6–8.4)
RBC # BLD AUTO: 4.72 M/UL (ref 4–5.4)
SODIUM SERPL-SCNC: 137 MMOL/L (ref 136–145)
TRIGL SERPL-MCNC: 82 MG/DL (ref 30–150)
TSH SERPL DL<=0.005 MIU/L-ACNC: 1.42 UIU/ML (ref 0.4–4)
WBC # BLD AUTO: 8.45 K/UL (ref 3.9–12.7)

## 2022-10-20 PROCEDURE — 3078F DIAST BP <80 MM HG: CPT | Mod: CPTII,S$GLB,, | Performed by: INTERNAL MEDICINE

## 2022-10-20 PROCEDURE — 84443 ASSAY THYROID STIM HORMONE: CPT | Performed by: INTERNAL MEDICINE

## 2022-10-20 PROCEDURE — 99999 PR PBB SHADOW E&M-EST. PATIENT-LVL III: CPT | Mod: PBBFAC,,, | Performed by: INTERNAL MEDICINE

## 2022-10-20 PROCEDURE — 3078F PR MOST RECENT DIASTOLIC BLOOD PRESSURE < 80 MM HG: ICD-10-PCS | Mod: CPTII,S$GLB,, | Performed by: INTERNAL MEDICINE

## 2022-10-20 PROCEDURE — 85025 COMPLETE CBC W/AUTO DIFF WBC: CPT | Performed by: INTERNAL MEDICINE

## 2022-10-20 PROCEDURE — 99999 PR PBB SHADOW E&M-EST. PATIENT-LVL III: ICD-10-PCS | Mod: PBBFAC,,, | Performed by: INTERNAL MEDICINE

## 2022-10-20 PROCEDURE — 80061 LIPID PANEL: CPT | Performed by: INTERNAL MEDICINE

## 2022-10-20 PROCEDURE — 99395 PREV VISIT EST AGE 18-39: CPT | Mod: S$GLB,,, | Performed by: INTERNAL MEDICINE

## 2022-10-20 PROCEDURE — 99395 PR PREVENTIVE VISIT,EST,18-39: ICD-10-PCS | Mod: S$GLB,,, | Performed by: INTERNAL MEDICINE

## 2022-10-20 PROCEDURE — 80053 COMPREHEN METABOLIC PANEL: CPT | Performed by: INTERNAL MEDICINE

## 2022-10-20 PROCEDURE — 3074F SYST BP LT 130 MM HG: CPT | Mod: CPTII,S$GLB,, | Performed by: INTERNAL MEDICINE

## 2022-10-20 PROCEDURE — 83036 HEMOGLOBIN GLYCOSYLATED A1C: CPT | Performed by: INTERNAL MEDICINE

## 2022-10-20 PROCEDURE — 3074F PR MOST RECENT SYSTOLIC BLOOD PRESSURE < 130 MM HG: ICD-10-PCS | Mod: CPTII,S$GLB,, | Performed by: INTERNAL MEDICINE

## 2022-10-20 PROCEDURE — 36415 COLL VENOUS BLD VENIPUNCTURE: CPT | Performed by: INTERNAL MEDICINE

## 2022-10-20 NOTE — PROGRESS NOTES
"Subjective:       Patient ID: Klarissa Brown is a 37 y.o. female.    Chief Complaint: Establish Care    HPI    Mrs. Brown is a 38 yo female who presents to [facility].     She is feeling well today with no acute complaints.     Recently had strep throat treated with penicillin with no response, then give Augmentin and improved. Has two year old in  who was also sick with strep.     PMHx:   HSV  Hx of positive HPV in 2018 which cleared in 2020 and mildly abnormal pap in 2020.     Surgeries: Knee surgery in 2020     Social:  with one chlid. Works as . No smoking or drug use.     Health Maintenance:  Mammogram: Had breast US in 2020 with lump and repeat showed no growth and no suspicious features. Will start annual mammograms in 2024.   HIV: negative in 2020   Hep C: negative in 2020   Lipids: Order today   Pap Smear: Hx of positive HPV in 2018 which cleared in 2020 and mildly  abnormal pap in 2020. She will schedule appt with her GYN to follow up   Vaccines: flu shot today, otherwise up to date with vaccines       Review of Systems   Constitutional:  Negative for activity change, appetite change and chills.   HENT:  Negative for ear pain, sinus pressure/congestion and sneezing.    Respiratory:  Negative for cough and shortness of breath.    Cardiovascular:  Negative for chest pain, palpitations and leg swelling.   Gastrointestinal:  Negative for abdominal distention, abdominal pain, constipation, diarrhea, nausea and vomiting.   Genitourinary:  Negative for dysuria and hematuria.   Musculoskeletal:  Negative for arthralgias, back pain and myalgias.   Neurological:  Negative for dizziness and headaches.   Psychiatric/Behavioral:  Negative for agitation. The patient is not nervous/anxious.          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)     Has not had an outbreak in "a few years"    HSV (herpes simplex virus) anogenital infection     " Last outbreak was about 3-4 years ago     Past Surgical History:   Procedure Laterality Date    ARTHROSCOPIC CHONDROPLASTY OF KNEE JOINT Right 10/10/2019    Procedure: ARTHROSCOPY, KNEE, WITH CHONDROPLASTY;  Surgeon: Lázaro Cody MD;  Location: [facility] OR;  Service: Orthopedics;  Laterality: Right;    FRACTURE SURGERY  Acl repair 2019    HERNIA REPAIR      as 1 year old    KNEE ARTHROSCOPY W/ ACL RECONSTRUCTION Right 10/10/2019    Procedure: RECONSTRUCTION, KNEE, ACL, ARTHROSCOPIC;  Surgeon: Lázaro Cody MD;  Location: [facility] OR;  Service: Orthopedics;  Laterality: Right;  Adductor,Exparel-Bupivacaine Liposome Injection 30cc,Clonidine/Epi/Ketorolac/Ropivacaine Injection 30cc  Arthroscopy equipment,Knee arthrotomy set,Mitek TrueSpan(curved),Mitek Vapor,Mitek adjustable rigidloop,Hamstring autograft,Muscle Stimulator,[facility]    SYNOVECTOMY OF KNEE Right 10/10/2019    Procedure: SYNOVECTOMY, KNEE;  Surgeon: Lázaro Cody MD;  Location: [facility] OR;  Service: Orthopedics;  Laterality: Right;      Patient Active Problem List   Diagnosis    Residual hemorrhoidal skin tags    Right knee pain    Acute pain of right knee    Decreased range of motion (ROM) of right knee    Muscle weakness of lower extremity    HSV (herpes simplex virus) anogenital infection    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   HENT:      Head: Normocephalic.      Right Ear: Tympanic membrane normal.      Left Ear: Tympanic membrane normal.      Nose: Nose normal.   Cardiovascular:      Rate and Rhythm: Normal rate and regular rhythm.      Pulses: Normal pulses.      Heart sounds: Normal heart sounds.   Pulmonary:      Effort: Pulmonary effort is normal.      Breath sounds: Normal breath sounds.   Abdominal:      General: Abdomen is flat. Bowel sounds are normal.      Palpations: Abdomen is soft.   Musculoskeletal:         General: Normal range of motion.       Cervical back: Normal range of motion and neck supple.   Skin:     General: Skin is warm and dry.   Neurological:      General: No focal deficit present.      Mental Status: She is alert and oriented to person, place, and time.   Psychiatric:         Mood and Affect: Mood normal.       Assessment:       Problem List Items Addressed This Visit    None  Visit Diagnoses       Annual physical exam    -  Primary    Relevant Orders    Comprehensive Metabolic Panel    CBC Auto Differential    Lipid Panel    Hemoglobin A1C    TSH            Plan:         Klarissa was seen today for establish care.    Diagnoses and all orders for this visit:    Annual physical exam  Mammogram: Had breast US in 2020 with lump and repeat showed no growth and no suspicious features. Will start annual mammograms in 2024.   HIV: negative in 2020   Hep C: negative in 2020   Lipids: Order today   Pap Smear: Hx of positive HPV in 2018 which cleared in 2020 and mildly  abnormal pap in 2020. She will schedule appt with her GYN to follow up   Vaccines: flu shot today, otherwise up to date with vaccines   Check basic labs today      Follow up in one year or PRN if needed           Huma Brunner MD   Internal Medicine   Primary Care

## 2022-11-09 ENCOUNTER — PATIENT MESSAGE (OUTPATIENT)
Dept: OBSTETRICS AND GYNECOLOGY | Facility: CLINIC | Age: 37
End: 2022-11-09
Payer: COMMERCIAL

## 2022-11-21 ENCOUNTER — PATIENT MESSAGE (OUTPATIENT)
Dept: OBSTETRICS AND GYNECOLOGY | Facility: CLINIC | Age: 37
End: 2022-11-21
Payer: COMMERCIAL

## 2022-12-15 ENCOUNTER — OFFICE VISIT (OUTPATIENT)
Dept: OBSTETRICS AND GYNECOLOGY | Facility: CLINIC | Age: 37
End: 2022-12-15
Attending: OBSTETRICS & GYNECOLOGY
Payer: COMMERCIAL

## 2022-12-15 VITALS
SYSTOLIC BLOOD PRESSURE: 126 MMHG | HEIGHT: 67 IN | DIASTOLIC BLOOD PRESSURE: 62 MMHG | WEIGHT: 209.19 LBS | BODY MASS INDEX: 32.83 KG/M2

## 2022-12-15 DIAGNOSIS — Z01.419 WELL WOMAN EXAM WITH ROUTINE GYNECOLOGICAL EXAM: Primary | ICD-10-CM

## 2022-12-15 PROBLEM — K64.4 RESIDUAL HEMORRHOIDAL SKIN TAGS: Status: RESOLVED | Noted: 2018-08-23 | Resolved: 2022-12-15

## 2022-12-15 PROBLEM — M25.561 ACUTE PAIN OF RIGHT KNEE: Status: RESOLVED | Noted: 2019-08-28 | Resolved: 2022-12-15

## 2022-12-15 PROCEDURE — 99999 PR PBB SHADOW E&M-EST. PATIENT-LVL III: CPT | Mod: PBBFAC,,, | Performed by: OBSTETRICS & GYNECOLOGY

## 2022-12-15 PROCEDURE — 3078F DIAST BP <80 MM HG: CPT | Mod: CPTII,S$GLB,, | Performed by: OBSTETRICS & GYNECOLOGY

## 2022-12-15 PROCEDURE — 88175 CYTOPATH C/V AUTO FLUID REDO: CPT | Performed by: OBSTETRICS & GYNECOLOGY

## 2022-12-15 PROCEDURE — 99395 PREV VISIT EST AGE 18-39: CPT | Mod: S$GLB,,, | Performed by: OBSTETRICS & GYNECOLOGY

## 2022-12-15 PROCEDURE — 3074F SYST BP LT 130 MM HG: CPT | Mod: CPTII,S$GLB,, | Performed by: OBSTETRICS & GYNECOLOGY

## 2022-12-15 PROCEDURE — 3074F PR MOST RECENT SYSTOLIC BLOOD PRESSURE < 130 MM HG: ICD-10-PCS | Mod: CPTII,S$GLB,, | Performed by: OBSTETRICS & GYNECOLOGY

## 2022-12-15 PROCEDURE — 3008F PR BODY MASS INDEX (BMI) DOCUMENTED: ICD-10-PCS | Mod: CPTII,S$GLB,, | Performed by: OBSTETRICS & GYNECOLOGY

## 2022-12-15 PROCEDURE — 1159F PR MEDICATION LIST DOCUMENTED IN MEDICAL RECORD: ICD-10-PCS | Mod: CPTII,S$GLB,, | Performed by: OBSTETRICS & GYNECOLOGY

## 2022-12-15 PROCEDURE — 1159F MED LIST DOCD IN RCRD: CPT | Mod: CPTII,S$GLB,, | Performed by: OBSTETRICS & GYNECOLOGY

## 2022-12-15 PROCEDURE — 1160F PR REVIEW ALL MEDS BY PRESCRIBER/CLIN PHARMACIST DOCUMENTED: ICD-10-PCS | Mod: CPTII,S$GLB,, | Performed by: OBSTETRICS & GYNECOLOGY

## 2022-12-15 PROCEDURE — 99395 PR PREVENTIVE VISIT,EST,18-39: ICD-10-PCS | Mod: S$GLB,,, | Performed by: OBSTETRICS & GYNECOLOGY

## 2022-12-15 PROCEDURE — 3044F PR MOST RECENT HEMOGLOBIN A1C LEVEL <7.0%: ICD-10-PCS | Mod: CPTII,S$GLB,, | Performed by: OBSTETRICS & GYNECOLOGY

## 2022-12-15 PROCEDURE — 3008F BODY MASS INDEX DOCD: CPT | Mod: CPTII,S$GLB,, | Performed by: OBSTETRICS & GYNECOLOGY

## 2022-12-15 PROCEDURE — 3078F PR MOST RECENT DIASTOLIC BLOOD PRESSURE < 80 MM HG: ICD-10-PCS | Mod: CPTII,S$GLB,, | Performed by: OBSTETRICS & GYNECOLOGY

## 2022-12-15 PROCEDURE — 99999 PR PBB SHADOW E&M-EST. PATIENT-LVL III: ICD-10-PCS | Mod: PBBFAC,,, | Performed by: OBSTETRICS & GYNECOLOGY

## 2022-12-15 PROCEDURE — 3044F HG A1C LEVEL LT 7.0%: CPT | Mod: CPTII,S$GLB,, | Performed by: OBSTETRICS & GYNECOLOGY

## 2022-12-15 PROCEDURE — 1160F RVW MEDS BY RX/DR IN RCRD: CPT | Mod: CPTII,S$GLB,, | Performed by: OBSTETRICS & GYNECOLOGY

## 2022-12-15 PROCEDURE — 87624 HPV HI-RISK TYP POOLED RSLT: CPT | Performed by: OBSTETRICS & GYNECOLOGY

## 2022-12-15 NOTE — PROGRESS NOTES
"SUBJECTIVE:   37 y.o. female   for annual routine Pap and checkup. No LMP recorded. (Menstrual status: Other)..  She has no unusual complaints and desires IUD removal.  She is interested in pregnancy.        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)     Has not had an outbreak in "a few years"    HSV (herpes simplex virus) anogenital infection     Last outbreak was about 3-4 years ago     Past Surgical History:   Procedure Laterality Date    ARTHROSCOPIC CHONDROPLASTY OF KNEE JOINT Right 10/10/2019    Procedure: ARTHROSCOPY, KNEE, WITH CHONDROPLASTY;  Surgeon: Lázaro Cody MD;  Location: [facility] OR;  Service: Orthopedics;  Laterality: Right;    FRACTURE SURGERY  Acl repair 2019    HERNIA REPAIR      as 1 year old    KNEE ARTHROSCOPY W/ ACL RECONSTRUCTION Right 10/10/2019    Procedure: RECONSTRUCTION, KNEE, ACL, ARTHROSCOPIC;  Surgeon: Lázaro Cody MD;  Location: [facility] OR;  Service: Orthopedics;  Laterality: Right;  Adductor,Exparel-Bupivacaine Liposome Injection 30cc,Clonidine/Epi/Ketorolac/Ropivacaine Injection 30cc  Arthroscopy equipment,Knee arthrotomy set,Mitek TrueSpan(curved),Mitek Vapor,Mitek adjustable rigidloop,Hamstring autograft,Muscle Stimulator,Ochsne D    SYNOVECTOMY OF KNEE Right 10/10/2019    Procedure: SYNOVECTOMY, KNEE;  Surgeon: Lázaro Cody MD;  Location: [facility] OR;  Service: Orthopedics;  Laterality: Right;     Social History     Socioeconomic History    Marital status:    Tobacco Use    Smoking status: Never    Smokeless tobacco: Never   Substance and Sexual Activity    Alcohol use: Yes     Alcohol/week: 2.0 standard drinks     Types: 2 Glasses of wine per week     Comment: 3-5 drinks/week    Drug use: No    Sexual activity: Yes     Partners: Male     Birth control/protection: I.U.D., None     Comment: Jay   Social History Narrative    Klarissa works for the [employer]    WILNER Jay owns [employer]    This is her first " pregnancy!! This is Jay's first baby too!        No cats     Family History   Problem Relation Age of Onset    Sleep apnea Mother     Arthritis Mother         injuries from gymnastics; hypermobile joints    Anxiety disorder Mother     Depression Mother     Hypertension Father     Bipolar disorder Maternal Grandmother     Other Brother         hypomania but not bipolar d/o?    Cancer Neg Hx     Diabetes Neg Hx     Heart attack Neg Hx     Ovarian cancer Neg Hx     Breast cancer Neg Hx     Colon cancer Neg Hx     Thrombosis Neg Hx      OB History    Para Term  AB Living   1 1 1 0 0 0   SAB IAB Ectopic Multiple Live Births   0 0 0 0        # Outcome Date GA Lbr Nathaniel/2nd Weight Sex Delivery Anes PTL Lv   1 Term                  Current Outpatient Medications   Medication Sig Dispense Refill    acyclovir (ZOVIRAX) 400 MG tablet TAKE 1 TABLET (400 MG TOTAL) BY MOUTH 4 (FOUR) TIMES DAILY. FOR 5 DAYS 20 tablet 12    azelastine (ASTELIN) 137 mcg (0.1 %) nasal spray 1 spray (137 mcg total) by Nasal route 2 (two) times daily. (Patient not taking: Reported on 12/15/2022) 30 mL 2    fluconazole (DIFLUCAN) 150 MG Tab Take 1 tablet by mouth once on day 1 and day 3 2 tablet 0     Current Facility-Administered Medications   Medication Dose Route Frequency Provider Last Rate Last Admin    levonorgestreL 20 mcg/24 hours (6 yrs) 52 mg IUD 1 Intra Uterine Device  1 Intra Uterine Device Intrauterine  Shauna JAZZMINE Gilliland MD   1 Intra Uterine Device at 21 1600     Allergies: Patient has no known allergies.     ROS:  Constitutional: no weight loss, weight gain, fever, fatigue  Eyes:  No vision changes, glasses/contacts  ENT/Mouth: No ulcers, sinus problems, ears ringing, headache  Cardiovascular: No inability to lie flat, chest pain, exercise intolerance, swelling, heart palpitations  Respiratory: No wheezing, coughing blood, shortness of breath, or cough  Gastrointestinal: No diarrhea, bloody stool, nausea/vomiting,  "constipation, gas, hemorrhoids  Genitourinary: No blood in urine, painful urination, urgency of urination, frequency of urination, incomplete emptying, incontinence, abnormal bleeding, painful periods, heavy periods, vaginal discharge, vaginal odor, painful intercourse, sexual problems, bleeding after intercourse.  Musculoskeletal: No muscle weakness  Skin/Breast: No painful breasts, nipple discharge, masses, rash, ulcers  Neurological: No passing out, seizures, numbness, headache  Endocrine: No diabetes, hypothyroid, hyperthyroid, hot flashes, hair loss, abnormal hair growth, ance  Psychiatric: No depression, crying  Hematologic: No bruises, bleeding, swollen lymph nodes, anemia.      OBJECTIVE:   The patient appears well, alert, oriented x 3, in no distress.  /62   Ht 5' 7" (1.702 m)   Wt 94.9 kg (209 lb 3.5 oz)   BMI 32.77 kg/m²   NECK: no thyromegaly, trachea midline  SKIN: no acne, striae, hirsutism  BREAST EXAM: breasts appear normal, no suspicious masses, no skin or nipple changes or axillary nodes  ABDOMEN: no hernias, masses, or hepatosplenomegaly  GENITALIA: normal external genitalia, no erythema, no discharge  URETHRA: normal urethra, normal urethral meatus  VAGINA: Normal  CERVIX: no lesions or cervical motion tenderness and IUD strings grasped with ring forceps.  IUD removed without difficulty  UTERUS: normal size, contour, position, consistency, mobility, non-tender  ADNEXA: normal adnexa and no mass, fullness, tenderness    \  ASSESSMENT:   Rich was seen today for gynecologic exam.    Diagnoses and all orders for this visit:    Well woman exam with routine gynecological exam  -     Liquid-Based Pap Smear, Screening  -     HPV High Risk Genotypes, PCR      Recommend pnv.  "

## 2022-12-24 LAB
CLINICAL INFO: NORMAL
CYTO CVX: NORMAL
CYTOLOGIST CVX/VAG CYTO: NORMAL
CYTOLOGIST CVX/VAG CYTO: NORMAL
CYTOLOGY CMNT CVX/VAG CYTO-IMP: NORMAL
CYTOLOGY PAP THIN PREP EXPLANATION: NORMAL
DATE OF PREVIOUS PAP: NO
DATE PREVIOUS BX: NO
GEN CATEG CVX/VAG CYTO-IMP: NORMAL
HPV I/H RISK 4 DNA CVX QL NAA+PROBE: NOT DETECTED
LMP START DATE: NORMAL
MICROORGANISM CVX/VAG CYTO: NORMAL
PATHOLOGIST CVX/VAG CYTO: NORMAL
SERVICE CMNT-IMP: NORMAL
SPECIMEN SOURCE CVX/VAG CYTO: NORMAL
STAT OF ADQ CVX/VAG CYTO-IMP: NORMAL

## 2023-01-26 ENCOUNTER — PATIENT MESSAGE (OUTPATIENT)
Dept: INTERNAL MEDICINE | Facility: CLINIC | Age: 38
End: 2023-01-26
Payer: COMMERCIAL

## 2023-01-30 ENCOUNTER — OFFICE VISIT (OUTPATIENT)
Dept: INTERNAL MEDICINE | Facility: CLINIC | Age: 38
End: 2023-01-30
Payer: COMMERCIAL

## 2023-01-30 VITALS
WEIGHT: 209 LBS | SYSTOLIC BLOOD PRESSURE: 121 MMHG | BODY MASS INDEX: 32.8 KG/M2 | DIASTOLIC BLOOD PRESSURE: 76 MMHG | HEIGHT: 67 IN | HEART RATE: 76 BPM | OXYGEN SATURATION: 100 %

## 2023-01-30 DIAGNOSIS — R05.1 ACUTE COUGH: Primary | ICD-10-CM

## 2023-01-30 PROBLEM — Z98.890 S/P ACL REPAIR: Status: ACTIVE | Noted: 2022-06-23

## 2023-01-30 PROBLEM — J02.0 STREP THROAT: Status: ACTIVE | Noted: 2022-10-01

## 2023-01-30 PROCEDURE — 1160F RVW MEDS BY RX/DR IN RCRD: CPT | Mod: CPTII,S$GLB,,

## 2023-01-30 PROCEDURE — 3074F SYST BP LT 130 MM HG: CPT | Mod: CPTII,S$GLB,,

## 2023-01-30 PROCEDURE — 3074F PR MOST RECENT SYSTOLIC BLOOD PRESSURE < 130 MM HG: ICD-10-PCS | Mod: CPTII,S$GLB,,

## 2023-01-30 PROCEDURE — 99213 OFFICE O/P EST LOW 20 MIN: CPT | Mod: S$GLB,,,

## 2023-01-30 PROCEDURE — 3078F DIAST BP <80 MM HG: CPT | Mod: CPTII,S$GLB,,

## 2023-01-30 PROCEDURE — 3008F PR BODY MASS INDEX (BMI) DOCUMENTED: ICD-10-PCS | Mod: CPTII,S$GLB,,

## 2023-01-30 PROCEDURE — 1159F PR MEDICATION LIST DOCUMENTED IN MEDICAL RECORD: ICD-10-PCS | Mod: CPTII,S$GLB,,

## 2023-01-30 PROCEDURE — 1159F MED LIST DOCD IN RCRD: CPT | Mod: CPTII,S$GLB,,

## 2023-01-30 PROCEDURE — 1160F PR REVIEW ALL MEDS BY PRESCRIBER/CLIN PHARMACIST DOCUMENTED: ICD-10-PCS | Mod: CPTII,S$GLB,,

## 2023-01-30 PROCEDURE — 3008F BODY MASS INDEX DOCD: CPT | Mod: CPTII,S$GLB,,

## 2023-01-30 PROCEDURE — 99213 PR OFFICE/OUTPT VISIT, EST, LEVL III, 20-29 MIN: ICD-10-PCS | Mod: S$GLB,,,

## 2023-01-30 PROCEDURE — 99999 PR PBB SHADOW E&M-EST. PATIENT-LVL IV: CPT | Mod: PBBFAC,,,

## 2023-01-30 PROCEDURE — 99999 PR PBB SHADOW E&M-EST. PATIENT-LVL IV: ICD-10-PCS | Mod: PBBFAC,,,

## 2023-01-30 PROCEDURE — 3078F PR MOST RECENT DIASTOLIC BLOOD PRESSURE < 80 MM HG: ICD-10-PCS | Mod: CPTII,S$GLB,,

## 2023-01-30 RX ORDER — BENZONATATE 100 MG/1
200 CAPSULE ORAL 3 TIMES DAILY PRN
Qty: 30 CAPSULE | Refills: 1 | Status: SHIPPED | OUTPATIENT
Start: 2023-01-30 | End: 2023-02-09

## 2023-01-30 RX ORDER — PROMETHAZINE HYDROCHLORIDE AND DEXTROMETHORPHAN HYDROBROMIDE 6.25; 15 MG/5ML; MG/5ML
5 SYRUP ORAL EVERY 8 HOURS PRN
Qty: 180 ML | Refills: 0 | Status: SHIPPED | OUTPATIENT
Start: 2023-01-30 | End: 2023-02-11

## 2023-01-30 NOTE — PROGRESS NOTES
I have discussed the case with house staff and I have reviewed the appropriate portions of the patient's chart, and the house staff's history and physical, assessment and plan. I agree with the findings, assessment and plan. Prob post viral with improvement and no red flags, treat symptomatically.

## 2023-01-30 NOTE — PROGRESS NOTES
"Internal Medicine Clinic Note    Subjective     Chief Complaint: Cough     History of Present Illness:  Ms. Klarissa Brown is a 37 y.o. female w/ PMHx of strep throat presenting for urgent care visit. Pt reports of a productive cough starting about 3 weeks ago which has since only slowly been improving, now starting to resolve since she made her appointment a few days ago. Toddler daughter recently sick with a similar cough first - attends . Tested negative for COVID and flu.    Aggravating factors: Laying flat at night  Alleviating factors: Sitting up, Mucinex, cough drops    No shortness of breath, fever, chills, dyspnea, wheezing.     Review of Systems   Constitutional:  Negative for chills and fever.   HENT:  Negative for congestion, ear pain and sore throat.    Eyes:  Negative for photophobia and discharge.   Respiratory:  Positive for cough. Negative for hemoptysis, shortness of breath and wheezing.    Cardiovascular:  Negative for chest pain.   Gastrointestinal:  Negative for abdominal pain, nausea and vomiting.   Genitourinary:  Negative for dysuria and frequency.   Musculoskeletal:  Negative for falls and neck pain.   Skin:  Negative for itching and rash.   Neurological:  Negative for weakness and headaches.   Psychiatric/Behavioral:  Negative for depression and hallucinations.      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)     Has not had an outbreak in "a few years"    HSV (herpes simplex virus) anogenital infection     Last outbreak was about 3-4 years ago       Past Surgical History:   Procedure Laterality Date    ARTHROSCOPIC CHONDROPLASTY OF KNEE JOINT Right 10/10/2019    Procedure: ARTHROSCOPY, KNEE, WITH CHONDROPLASTY;  Surgeon: Lázaro Cody MD;  Location: [facility];  Service: Orthopedics;  Laterality: Right;    FRACTURE SURGERY  Acl repair 2019    HERNIA REPAIR      as 1 year old    KNEE ARTHROSCOPY W/ ACL " RECONSTRUCTION Right 10/10/2019    Procedure: RECONSTRUCTION, KNEE, ACL, ARTHROSCOPIC;  Surgeon: Lázaro Cody MD;  Location: [facility] OR;  Service: Orthopedics;  Laterality: Right;  Adductor,Exparel-Bupivacaine Liposome Injection 30cc,Clonidine/Epi/Ketorolac/Ropivacaine Injection 30cc  Arthroscopy equipment,Knee arthrotomy set,Mitek TrueSpan(curved),Mitek Vapor,Mitek adjustable rigidloop,Hamstring autograft,Muscle Stimulator,Ochsne D    SYNOVECTOMY OF KNEE Right 10/10/2019    Procedure: SYNOVECTOMY, KNEE;  Surgeon: Lázaro Cody MD;  Location: [facility] OR;  Service: Orthopedics;  Laterality: Right;       Family History   Problem Relation Age of Onset    Sleep apnea Mother     Arthritis Mother         injuries from gymnastics; hypermobile joints    Anxiety disorder Mother     Depression Mother     Hypertension Father     Bipolar disorder Maternal Grandmother     Other Brother         hypomania but not bipolar d/o?    Cancer Neg Hx     Diabetes Neg Hx     Heart attack Neg Hx     Ovarian cancer Neg Hx     Breast cancer Neg Hx     Colon cancer Neg Hx     Thrombosis Neg Hx        Social History     Socioeconomic History    Marital status:    Tobacco Use    Smoking status: Never    Smokeless tobacco: Never   Substance and Sexual Activity    Alcohol use: Yes     Alcohol/week: 2.0 standard drinks     Types: 2 Glasses of wine per week     Comment: 3-5 drinks/week    Drug use: No    Sexual activity: Yes     Partners: Male     Birth control/protection: I.U.D., None     Comment: Jay   Social History Narrative    Klarissa works for the [employer]    FOB Jay owns [employer]    This is her first pregnancy!! This is Jay's first baby too!        No cats       MEDICATIONS & ALLERGIES:     Current Outpatient Medications on File Prior to Visit   Medication Sig    acyclovir (ZOVIRAX) 400 MG tablet TAKE 1 TABLET (400 MG TOTAL) BY MOUTH 4 (FOUR) TIMES DAILY. FOR 5 DAYS    fluconazole (DIFLUCAN) 150 MG Tab Take 1 tablet  "by mouth once on day 1 and day 3    [DISCONTINUED] azelastine (ASTELIN) 137 mcg (0.1 %) nasal spray 1 spray (137 mcg total) by Nasal route 2 (two) times daily. (Patient not taking: Reported on 12/15/2022)     Current Facility-Administered Medications on File Prior to Visit   Medication    levonorgestreL 20 mcg/24 hours (6 yrs) 52 mg IUD 1 Intra Uterine Device       Review of patient's allergies indicates:  No Known Allergies    OBJECTIVE:     Vital Signs:  Vitals:    01/30/23 1323   BP: 121/76   BP Location: Left arm   Patient Position: Sitting   BP Method: Medium (Manual)   Pulse: 76   SpO2: 100%   Weight: 94.8 kg (209 lb)   Height: 5' 7" (1.702 m)       Body mass index is 32.73 kg/m².     Physical Exam  Constitutional:       General: She is not in acute distress.     Appearance: She is normal weight. She is not ill-appearing, toxic-appearing or diaphoretic.   HENT:      Head: Normocephalic and atraumatic.      Mouth/Throat:      Mouth: Mucous membranes are moist.      Pharynx: No oropharyngeal exudate.   Eyes:      General: No scleral icterus.        Right eye: No discharge.         Left eye: No discharge.      Extraocular Movements: Extraocular movements intact.      Conjunctiva/sclera: Conjunctivae normal.   Cardiovascular:      Rate and Rhythm: Normal rate and regular rhythm.      Heart sounds: No murmur heard.    No friction rub.   Pulmonary:      Effort: Pulmonary effort is normal. No respiratory distress.      Breath sounds: No stridor. No wheezing.      Comments: Very mild coarse breath sounds to bilateral lower lung zones that clears with coughing.   Abdominal:      General: Abdomen is flat. There is no distension.   Musculoskeletal:      Cervical back: Neck supple. No rigidity.   Lymphadenopathy:      Cervical: No cervical adenopathy.   Skin:     General: Skin is warm and dry.      Findings: No bruising or rash.   Neurological:      Mental Status: She is alert and oriented to person, place, and time. " Mental status is at baseline.      Gait: Gait normal.   Psychiatric:         Mood and Affect: Mood normal.         Behavior: Behavior normal.     Laboratory  Lab Results   Component Value Date    WBC 8.45 10/20/2022    HGB 13.2 10/20/2022    HCT 40.2 10/20/2022    MCV 85 10/20/2022     10/20/2022     BMP  Lab Results   Component Value Date     10/20/2022    K 4.7 10/20/2022     10/20/2022    CO2 23 10/20/2022    BUN 13 10/20/2022    CREATININE 0.8 10/20/2022    CALCIUM 9.5 10/20/2022    ANIONGAP 9 10/20/2022    EGFRNORACEVR >60.0 10/20/2022     No results found for: INR, PROTIME  Lab Results   Component Value Date    HGBA1C 5.5 10/20/2022       Diagnostic Results:  Labs: Reviewed  X-Ray: Reviewed    Health Maintenance Due   Topic Date Due    COVID-19 Vaccine (4 - Booster) 05/01/2021    Influenza Vaccine (1) 09/01/2022         ASSESSMENT & PLAN:   Ms. Klarissa Brown is a 37 y.o. female with PMHx as above, recently with productive cough starting about 3 weeks ago after being around toddler daughter with similar cough. No other symptoms noted. Resolving.   Acute cough  -     benzonatate (TESSALON) 100 MG capsule; Take 2 capsules (200 mg total) by mouth 3 (three) times daily as needed for Cough.  Dispense: 30 capsule; Refill: 1  -     promethazine-dextromethorphan (PROMETHAZINE-DM) 6.25-15 mg/5 mL Syrp; Take 5 mLs by mouth every 8 (eight) hours as needed.  Dispense: 180 mL; Refill: 0      RTC as needed.    Discussed with Dr. Kothari  - staff attestation to follow        Meera Burt MD  Internal Medicine, PGY-II  [facility]  [address] [place] [address] [phone]

## 2023-01-30 NOTE — PATIENT INSTRUCTIONS
Please take the cough syrup and tessalon perles as needed for your coughing.    If symptoms do not improve in 2 weeks, please reach back out. Look for fever, chills, wheezing, worsening sputum production.

## 2023-02-10 ENCOUNTER — PATIENT MESSAGE (OUTPATIENT)
Dept: OBSTETRICS AND GYNECOLOGY | Facility: CLINIC | Age: 38
End: 2023-02-10
Payer: COMMERCIAL

## 2023-02-10 RX ORDER — ACYCLOVIR 400 MG/1
TABLET ORAL
Qty: 20 TABLET | Refills: 12 | Status: SHIPPED | OUTPATIENT
Start: 2023-02-10 | End: 2023-06-01 | Stop reason: SDUPTHER

## 2023-02-13 ENCOUNTER — PATIENT MESSAGE (OUTPATIENT)
Dept: OBSTETRICS AND GYNECOLOGY | Facility: CLINIC | Age: 38
End: 2023-02-13
Payer: COMMERCIAL

## 2023-02-13 DIAGNOSIS — Z34.90 PREGNANCY, UNSPECIFIED GESTATIONAL AGE: Primary | ICD-10-CM

## 2023-02-16 ENCOUNTER — PATIENT MESSAGE (OUTPATIENT)
Dept: OBSTETRICS AND GYNECOLOGY | Facility: CLINIC | Age: 38
End: 2023-02-16
Payer: COMMERCIAL

## 2023-02-17 ENCOUNTER — LAB VISIT (OUTPATIENT)
Dept: LAB | Facility: HOSPITAL | Age: 38
End: 2023-02-17
Attending: OBSTETRICS & GYNECOLOGY
Payer: COMMERCIAL

## 2023-02-17 ENCOUNTER — TELEPHONE (OUTPATIENT)
Dept: OBSTETRICS AND GYNECOLOGY | Facility: CLINIC | Age: 38
End: 2023-02-17
Payer: COMMERCIAL

## 2023-02-17 DIAGNOSIS — Z34.90 PREGNANCY, UNSPECIFIED GESTATIONAL AGE: ICD-10-CM

## 2023-02-17 DIAGNOSIS — Z34.90 PREGNANCY, UNSPECIFIED GESTATIONAL AGE: Primary | ICD-10-CM

## 2023-02-17 PROCEDURE — 84702 CHORIONIC GONADOTROPIN TEST: CPT | Performed by: OBSTETRICS & GYNECOLOGY

## 2023-02-17 PROCEDURE — 36415 COLL VENOUS BLD VENIPUNCTURE: CPT | Mod: PO | Performed by: OBSTETRICS & GYNECOLOGY

## 2023-02-17 NOTE — TELEPHONE ENCOUNTER
S/W pt    States she is still having some bleeding, unable to make appt for today, denies any sever cramping or pain  Denies bleeding through 1 pad an hour    Advised to continue to monitor bleeding , go to ER for evaluation if  symptoms worsen , ordered HCG level for today if she can make it to an [facility]  will follow up on Monday

## 2023-02-18 ENCOUNTER — TELEPHONE (OUTPATIENT)
Dept: OBSTETRICS AND GYNECOLOGY | Facility: CLINIC | Age: 38
End: 2023-02-18
Payer: COMMERCIAL

## 2023-02-18 DIAGNOSIS — O02.1 MISSED AB: Primary | ICD-10-CM

## 2023-02-18 LAB — HCG INTACT+B SERPL-ACNC: 8.9 MIU/ML

## 2023-02-20 ENCOUNTER — LAB VISIT (OUTPATIENT)
Dept: LAB | Facility: HOSPITAL | Age: 38
End: 2023-02-20
Attending: OBSTETRICS & GYNECOLOGY
Payer: COMMERCIAL

## 2023-02-20 ENCOUNTER — TELEPHONE (OUTPATIENT)
Dept: OBSTETRICS AND GYNECOLOGY | Facility: CLINIC | Age: 38
End: 2023-02-20
Payer: COMMERCIAL

## 2023-02-20 DIAGNOSIS — O02.1 MISSED AB: ICD-10-CM

## 2023-02-20 DIAGNOSIS — Z32.01 POSITIVE PREGNANCY TEST: ICD-10-CM

## 2023-02-20 DIAGNOSIS — O02.1 MISSED AB: Primary | ICD-10-CM

## 2023-02-20 LAB — HCG INTACT+B SERPL-ACNC: 4.7 MIU/ML

## 2023-02-20 PROCEDURE — 84702 CHORIONIC GONADOTROPIN TEST: CPT | Performed by: OBSTETRICS & GYNECOLOGY

## 2023-02-20 PROCEDURE — 36415 COLL VENOUS BLD VENIPUNCTURE: CPT | Mod: PN | Performed by: OBSTETRICS & GYNECOLOGY

## 2023-02-23 ENCOUNTER — LAB VISIT (OUTPATIENT)
Dept: LAB | Facility: HOSPITAL | Age: 38
End: 2023-02-23
Attending: OBSTETRICS & GYNECOLOGY
Payer: COMMERCIAL

## 2023-02-23 DIAGNOSIS — O02.1 MISSED AB: ICD-10-CM

## 2023-02-23 LAB — HCG INTACT+B SERPL-ACNC: <2.4 MIU/ML

## 2023-02-23 PROCEDURE — 84702 CHORIONIC GONADOTROPIN TEST: CPT | Performed by: OBSTETRICS & GYNECOLOGY

## 2023-02-23 PROCEDURE — 36415 COLL VENOUS BLD VENIPUNCTURE: CPT | Mod: PO | Performed by: OBSTETRICS & GYNECOLOGY

## 2023-03-10 NOTE — ADDENDUM NOTE
Addended by: ANILA ESCOBAR on: 2/7/2023 08:23 AM     Modules accepted: Level of Service     10-Mar-2023 07:05

## 2023-03-23 ENCOUNTER — PATIENT MESSAGE (OUTPATIENT)
Dept: OBSTETRICS AND GYNECOLOGY | Facility: CLINIC | Age: 38
End: 2023-03-23
Payer: COMMERCIAL

## 2023-03-23 ENCOUNTER — TELEPHONE (OUTPATIENT)
Dept: OBSTETRICS AND GYNECOLOGY | Facility: CLINIC | Age: 38
End: 2023-03-23
Payer: COMMERCIAL

## 2023-03-23 DIAGNOSIS — Z31.41 FERTILITY TESTING: Primary | ICD-10-CM

## 2023-03-23 NOTE — TELEPHONE ENCOUNTER
----- Message from Moise Stokes LPN sent at 3/23/2023  2:05 PM CDT -----  Regarding: FW: Orders  Contact: FATUMA ESPINOZA [[id number]]    ----- Message -----  From: Pat Farfan  Sent: 3/23/2023   1:16 PM CDT  To: Vasyl DICK Staff  Subject: Orders                                           Name of Who is Calling: Fatuma Espinoza             What is the request in detail: Pt states she is requesting orders to be put in as advised per my chart message. She states she is going this afternoon and wants the staff to put in regards before she leaves her house.   She states she is going to [facility]   Please advise           Can the clinic reply by [facility]: No           What [facility] Home Phone      [phone]  Work Phone      Not on file.  Mobile          [phone]

## 2023-03-24 ENCOUNTER — LAB VISIT (OUTPATIENT)
Dept: LAB | Facility: HOSPITAL | Age: 38
End: 2023-03-24
Attending: OBSTETRICS & GYNECOLOGY
Payer: COMMERCIAL

## 2023-03-24 DIAGNOSIS — Z32.01 POSITIVE PREGNANCY TEST: ICD-10-CM

## 2023-03-24 DIAGNOSIS — Z31.41 FERTILITY TESTING: ICD-10-CM

## 2023-03-24 LAB
ESTRADIOL SERPL-MCNC: 14 PG/ML
FSH SERPL-ACNC: 9.64 MIU/ML
HCG INTACT+B SERPL-ACNC: <2.4 MIU/ML
PROLACTIN SERPL IA-MCNC: 11.4 NG/ML (ref 5.2–26.5)
TSH SERPL DL<=0.005 MIU/L-ACNC: 1.88 UIU/ML (ref 0.4–4)

## 2023-03-24 PROCEDURE — 36415 COLL VENOUS BLD VENIPUNCTURE: CPT | Mod: PN | Performed by: OBSTETRICS & GYNECOLOGY

## 2023-03-24 PROCEDURE — 83001 ASSAY OF GONADOTROPIN (FSH): CPT | Performed by: OBSTETRICS & GYNECOLOGY

## 2023-03-24 PROCEDURE — 84702 CHORIONIC GONADOTROPIN TEST: CPT | Performed by: OBSTETRICS & GYNECOLOGY

## 2023-03-24 PROCEDURE — 82670 ASSAY OF TOTAL ESTRADIOL: CPT | Performed by: OBSTETRICS & GYNECOLOGY

## 2023-03-24 PROCEDURE — 84443 ASSAY THYROID STIM HORMONE: CPT | Performed by: OBSTETRICS & GYNECOLOGY

## 2023-03-24 PROCEDURE — 84146 ASSAY OF PROLACTIN: CPT | Performed by: OBSTETRICS & GYNECOLOGY

## 2023-04-10 ENCOUNTER — TELEPHONE (OUTPATIENT)
Dept: OBSTETRICS AND GYNECOLOGY | Facility: CLINIC | Age: 38
End: 2023-04-10
Payer: COMMERCIAL

## 2023-04-10 ENCOUNTER — PATIENT MESSAGE (OUTPATIENT)
Dept: OBSTETRICS AND GYNECOLOGY | Facility: CLINIC | Age: 38
End: 2023-04-10
Payer: COMMERCIAL

## 2023-04-10 DIAGNOSIS — N93.9 ABNORMAL UTERINE BLEEDING (AUB): Primary | ICD-10-CM

## 2023-04-10 NOTE — TELEPHONE ENCOUNTER
----- Message from Lsia Mccormack sent at 4/10/2023  9:20 AM CDT -----  Regarding: lab orders  Name of Who is Calling:FATUMA ESPINOZA [[id number]]          What is the request in detail: Pt is calling. She said that it is day 21 of her cycle and she is ready to do her next round of labs. She is asking if you can please put  her orders in.  Please call pt when the orders are in so she knows to head over to the lab.           Can the clinic reply by [facility]:          What Number to Call Back if not in [facility]:[phone]

## 2023-04-11 ENCOUNTER — LAB VISIT (OUTPATIENT)
Dept: LAB | Facility: HOSPITAL | Age: 38
End: 2023-04-11
Attending: OBSTETRICS & GYNECOLOGY
Payer: COMMERCIAL

## 2023-04-11 DIAGNOSIS — N93.9 ABNORMAL UTERINE BLEEDING (AUB): ICD-10-CM

## 2023-04-11 LAB
ESTRADIOL SERPL-MCNC: 32 PG/ML
FSH SERPL-ACNC: 7.79 MIU/ML
PROGEST SERPL-MCNC: 9.4 NG/ML
PROLACTIN SERPL IA-MCNC: 15.6 NG/ML (ref 5.2–26.5)
TSH SERPL DL<=0.005 MIU/L-ACNC: 3.78 UIU/ML (ref 0.4–4)

## 2023-04-11 PROCEDURE — 82670 ASSAY OF TOTAL ESTRADIOL: CPT | Performed by: OBSTETRICS & GYNECOLOGY

## 2023-04-11 PROCEDURE — 84146 ASSAY OF PROLACTIN: CPT | Performed by: OBSTETRICS & GYNECOLOGY

## 2023-04-11 PROCEDURE — 84144 ASSAY OF PROGESTERONE: CPT | Performed by: OBSTETRICS & GYNECOLOGY

## 2023-04-11 PROCEDURE — 84443 ASSAY THYROID STIM HORMONE: CPT | Performed by: OBSTETRICS & GYNECOLOGY

## 2023-04-11 PROCEDURE — 83001 ASSAY OF GONADOTROPIN (FSH): CPT | Performed by: OBSTETRICS & GYNECOLOGY

## 2023-04-11 PROCEDURE — 36415 COLL VENOUS BLD VENIPUNCTURE: CPT | Mod: PO | Performed by: OBSTETRICS & GYNECOLOGY

## 2023-04-18 ENCOUNTER — PATIENT MESSAGE (OUTPATIENT)
Dept: OBSTETRICS AND GYNECOLOGY | Facility: CLINIC | Age: 38
End: 2023-04-18
Payer: COMMERCIAL

## 2023-05-11 NOTE — OP NOTE
Expand All Collapse All   DATE OF PROCEDURE: 10/10/2019    ATTENDING SURGEON: Surgeon(s) and Role:     * Lázaro Cody MD - Primary    Assistants:  Hill Smith MD - Fellow  Katt Smith PA-C     PREOPERATIVE DIAGNOSIS:  Right  Anterior Cruciate Ligament Tear S83.510    POSTOPERATIVE DIAGNOSIS:   Right  Anterior Cruciate Ligament Tear S83.510    PROCEDURES(S) PERFORMED:   1. Right  Arthroscopy, anterior cruciate ligament reconstruction 35600  2.  Right  Arthroscopy, knee, synovectomy, limited 75726    ANESTHESIA: Spinal and Adductor Block with catheter  Local anesthetic:  30  ml 0.5% ropivicaine mixture    FLUIDS IN THE CASE: 1000 ml    ESTIMATED BLOOD LOSS: Minimal    URINE OUTPUT: 0 ml    COMPLICATIONS: none    CONDITION ON RETURN TO RECOVERY ROOM: Good    IMPLANTS UTILIZED:  Mitek rigidfix/intrafix  Mitek rigid loop adjustable cortical implant standard x 2  Mitek Bio-Intrafix 8-10 x 30 mm tapered screw  Mitek Bio-Intrafix Large tibial sheath       GRAFT SOURCE:  Hamstring auto graft      Size 9.0 x 120 mm graft  AM Bundle 7.0 x 30 mm  PL Bundle 5.5 x 30 mm      Findings:     ARTICULAR CARTILAGE LESION(S):  Medial Femoral Condyle: ICRS Grade 0      Size: none  Medial tibial plateau: ICRS Grade 0      Size: none    Lateral Femoral Condyle: ICRS Grade 0      Size: none  Lateral tibial plateau: ICRS Grade 0      Size: none    Patellar surface: ICRS Grade 0      Size: none  Trochlear groove: ICRS Grade 0      Size: none      EXAMINATION UNDER ANESTHESIA:   Extension -5 degrees, hyperextension  Flexion 150 degrees  Lachman Maneuver:  2B  Anterior Drawer: >10 mm  Pivot Shift: Positive  Posterior Drawer:  Negative  Varus stability @ 30 degrees: 0  Valgus stability @ 30 degrees: 0  Patellar glide:1 quadrant lateral, 2 quadrant medial      Meniscal status:  Medial meniscus:   none  Tear location:  none    Lateral meniscus:   none  Tear location:  none    IINDICATIONS FOR OPERATIVE PROCEDURE:  Klarissa Bentley  Kevin is a 34 y.o. female with history of right knee pain and pathology. The patient's history and physical examination findings consistent with the procedure performed. He noted significant problems in the area of concern with problems on activities of daily living and aggressive use of the right leg. As a result of these problems and problems with overall activity level, the patient was deemed to be an appropriate candidate for operative intervention. Nonoperative versus operative options were discussed. The risks and benefits were discussed with the patient. The patient acknowledged understanding and wished to proceed with operative intervention. Informed consent was obtained prior to the procedure. Details of the surgical procedure were explained, including incisions and probable rehabilitation course. The patient understands the likely length of convalescence after surgery; and we have explained the risks, benefits, and alternatives of surgery. Reasonable expectations and potential complications were discussed and acknowledged, including but not limited to infection, bleeding, blood clots, (DVT and/or PE), nerve injury, retear, instability, continued pain and stiffness. It was also explained that there was a chance of failure which may require further management. The patient agreed and understood and wished to proceed.     DESCRIPTION OF PROCEDURE: The patient was brought into the Operating Room, placed in supine position. Upon application of general anesthetic as well as placement of LMA to stabilize the airway, the patient was given the appropriate dose of antibiotics based on body weight. Time-out was utilized to verify right side as the operative site. Both upper extremities were placed in comfortable position. Left leg was carefully padded along the heel and regions. Right leg was then raised and tourniquet was applied proximally with the lateral post and   popliteal post along the right side of the table.  The right leg was then   prepped and draped in a sterile fashion with ChloraPrep material.     The right legwas then examined under anesthesia with the above stated findings. Knee was taken into flexion and an anteromedial based incision was carried down through the skin down the fat and fascia. Layer 1 was cut in an inverted L-shaped fashion. Grafts were obtained off the undersurface of layer 1. Grafts were then oversewn with #2 Orthocord suture limbs placed in modified Krackow fashion within the free end of hamstring tendons. A tendon stripper was used to obtain each graft. Muscle was removed from the proximal belly of each graft.     Grafts were then oversewed with additional #2 Orthocord sutures placed in   modified Krackow fashion with the free ends of each area of concern. Sutures were then sized demonstrating a diameters of 6.5 for the semitendinosus graft and 6 mm for the gracilis graft and loop. Combined diameter was 8.5 mm. Grafts were then tensioned to 15 pounds of tension using the MiteDailyPath adjustable loop device and 2-0 Vicryl pursestring sutures were placed in the looped portion of each graft to maintain equal tension the two limbs of the graft. Grafts were then saved in moist lap pads for later use in the case.    Attention was turned to the arthroscopic portion of the procedure. Knee was taken to flexion 90 degrees. We instilled 10 mL of the BARBARA mixture into the anterolateral and anteromedial aspect medial aspects of the knee. A #11 blade was used to make these portals. Blunt trocar and sheath were inserted into the intercondylar notch and then subsequently into the suprapatellar pouch. This patellofemoral joint was visualized, demonstrating normal lateral patellar tilt, normal patellar subluxation. The patellar tracked midline with flexion and extension. Arthroscopic pictures were obtained. There was no articular cartilage damage in the patellofemoral compartment The lesion if present was treated with  No observation.      Attention was turned to the intercondylar notch where the anterior cruciate ligament (ACL) and posterior cruciate ligament (PCL) structures were visualized. Visualization demonstrated complete tearing of the ACL with an intact PCL. noted     Attention was then turned to the lateral compartment. The patient demonstrated an intact lateral meniscus with probe analysis demonstrating no occult tears or pathology Arthroscopic instrumentation was used to veify no tear and pictures obtained. There was no articular cartilage damage in the lateral compartment The lesion if present was treated with observation.     Attention was then turned to the medial compartment. The patient demonstrated an intact medial meniscus with probe analysis demonstrating no occult tears or pathology There was no articular cartilage damage in the medial compartment The lesion if present was treated with observation.    Pictures were obtained. Knee was then taken into hyperflexion. We removed scar from the anterolateral aspect of the intercondylar notch. Knee was then taken into hyperflexion and we placed a flexible guidepin of the anterolateral aspect of the thigh. This area was then overdrilled with a 4.5 flexible reaming device demonstrating a tunnel length of 35 mm. We then overdrilled with a 7.0 mm flexible reaming device to a depth of 30 mm. The wire was then used to place a passing suture for later passes of the graft looped portion maintained distally and saved along the anterior aspect of the thigh with a stat.    A guidepin was placed in the presumed location of the posterolateral portal was placed out of the anterolateral aspect of the thigh. This was overdrilled with a 4.5 drill bit demonstrating a tunnel length of 35 mm. This was then overdrilled with a 5.5 mm flexible reaming device to a depth of 30 mm. Once again, the wire was used to place a passing suture with looped portion maintained distally, and was saved along  the anterior aspect of the thigh with a stat. The final debridement of all bony fragments was performed along the area of concern.     Knee was then taken to flexion 90 degrees. With the arthroscope in the anterolateral portal, a guide was placed in the anteromedial portal and   the stump of the ACL remnant. The bullet portion was placed directly along the anteromedial aspect of the tibia through the previously created anteromedial incision into the stump of the ACL. Guidepin was left in place. It was overdrilled with a 7.5 mm drill bit and then dilated up to 9.0 mm in 0.5 mm increments. Final debridement of all inflamed tissues was performed along the area of concern. As a specimen all synovitic tissue along the anterior aspect of knee with a 4.2 shaver as well as use of a MiteKlickEx VAPR probe.    A crab claw device was used to place all passing sutures distally into the   tibial tunnel. The grafts were then passed passing the anteromedial bundle   first. The rigid loop device was then flipped and using sequential suture   technique. Sutures were used to pull the graft into the femoral tunnel. The   grafts fit in excellent fashion. We then repeated this process for the   posterolateral bundle. Grafts were then once again tensioned appropriately with knee was taken through 20 cycles. Final pictures were obtained demonstrating no signs of graft impingement. Final debridement of all inflamed tissues was performed with a 4.2 shaver.      Arthroscopic limited synovectomy was performed in the anterior medially and laterally regions of the knee.     Arthroscopic instrumentation was removed from the knee. We then took the knee through 20 cycles to remove any creep in the ligament. Knee was taken at 60 degrees flexion. Distal tension was applied with an Intrafix tensioning device to 20 pounds of tension. Tibial tunnel was then dilated. Large sheath was applied and 8-10 x 30 mm screw was applied. Excellent fixation was achieved  in the area of concern. We then visualized the joint arthroscopically once again demonstrating no significant additional tension requiring stenting within the knee. As a result, the sutures peripherally along the region leading device were then cut clean with the Mitek cord cutting device and the passing suture was removed in standard fashion. Irrigation performed copiously along the area of concern. Fluid was extravasated from all areas. Knee was taken into 10 degrees flexion. Layer 1 was closed in an inverted L-shaped fashion.     At this point, we closed layer 1 with a series of #1 Vicryl placed   in figure-of-eight fashion. Subcutaneous tissues were closed with 3-0 Vicryls placed in inverted fashion. Skin was closed with running 4-0 Monocryl sutures placed in subcuticular fashion along with application of Dermabond and tape.     Arthroscopic portals were closed with 4-0 nylon sutures. We then injected additional 0.5% ropivicaine mixture using 10 ml per portal sites and along the soft tissue regions for additional pain control postoperatively.     Xeroform was applied along with application of sterile electrodes proximally and distally, gauze, ABD pads,cast padding, long-leg WNE hose stocking and cooling unit. The patient's knee was placed into a hinged immobilizer, which was locked in -10 degrees extension and allowed the flexion to 120 degrees. The patient was then allowed to recover from anesthesia.  Spinal was removed. The patient was taken to Recovery Room in  Good  condition. At the completion case, all instrument and sponge counts were   correct.    NOTE: I was present and scrubbed for the key portions of the procedure.    PHYSICAL THERAPY:  The patient should begin physical therapy on postoperative   day #3 and will be advanced to outpatient therapy as soon as   Possible following discharge.    Weight bearing:as tolerated  right leg  Range of Motion:Full normal motion symmetric to opposite side    No CPM  required due to procedure performed   to begin quad sets with a heel roll to obtain hyperextension, straight leg raise and heel slides with the heel supported in a closed-chain fashion    Immediate specific exercises should include:   Gait program should include the above stated weightbearing; in addition: active extension with a heel-to-toe gait working on maintaining extension    Immobilizer if present should be locked @-10 degrees with gait and allowed to flex to 120 degrees at rest.        to begin quad sets with a heel roll to obtain hyperextension, straight leg raise and heel slides with the heel supported in a closed-chain fashion    Immediate specific exercises should include:   Gait program should include the above stated weightbearing; in  Medication(s): Preoperative Epic medication regimen      Discharge home when stable  Follow-up as scheduled  Activity per instruction sheet  Condition Stable    Discharge summary:  The patient was discharged to home in Good     Medication(s): Refer to Discharge Medication List    Follow-up as scheduled  Activity as above.         Expand All Collapse All      to begin quad sets with a heel roll to obtain hyperextension, straight leg raise and heel slides with the heel supported in a closed-chain fashion    Immediate specific exercises should include:   Gait program should include the above stated weightbearing; in  Medication(s): Preoperative Epic medication regimen      Discharge home when stable  Follow-up as scheduled  Activity per instruction sheet  Condition Stable

## 2023-05-30 ENCOUNTER — PATIENT MESSAGE (OUTPATIENT)
Dept: OBSTETRICS AND GYNECOLOGY | Facility: CLINIC | Age: 38
End: 2023-05-30
Payer: COMMERCIAL

## 2023-05-30 DIAGNOSIS — A60.9 HSV (HERPES SIMPLEX VIRUS) ANOGENITAL INFECTION: Primary | ICD-10-CM

## 2023-06-01 RX ORDER — ACYCLOVIR 400 MG/1
400 TABLET ORAL 2 TIMES DAILY
Qty: 60 TABLET | Refills: 11 | Status: SHIPPED | OUTPATIENT
Start: 2023-06-01 | End: 2023-11-28

## 2023-06-15 ENCOUNTER — PATIENT MESSAGE (OUTPATIENT)
Dept: OBSTETRICS AND GYNECOLOGY | Facility: CLINIC | Age: 38
End: 2023-06-15
Payer: COMMERCIAL

## 2023-06-27 ENCOUNTER — PATIENT MESSAGE (OUTPATIENT)
Dept: OBSTETRICS AND GYNECOLOGY | Facility: CLINIC | Age: 38
End: 2023-06-27
Payer: COMMERCIAL

## 2023-09-11 ENCOUNTER — PATIENT MESSAGE (OUTPATIENT)
Dept: OBSTETRICS AND GYNECOLOGY | Facility: CLINIC | Age: 38
End: 2023-09-11
Payer: COMMERCIAL

## 2023-10-30 ENCOUNTER — HOSPITAL ENCOUNTER (OUTPATIENT)
Dept: RADIOLOGY | Facility: OTHER | Age: 38
Discharge: HOME | End: 2023-10-30
Attending: INTERNAL MEDICINE
Payer: COMMERCIAL

## 2023-10-30 ENCOUNTER — OFFICE VISIT (OUTPATIENT)
Dept: INTERNAL MEDICINE | Facility: CLINIC | Age: 38
End: 2023-10-30
Payer: COMMERCIAL

## 2023-10-30 VITALS
HEART RATE: 86 BPM | OXYGEN SATURATION: 96 % | DIASTOLIC BLOOD PRESSURE: 87 MMHG | BODY MASS INDEX: 30.93 KG/M2 | HEIGHT: 67 IN | SYSTOLIC BLOOD PRESSURE: 132 MMHG | WEIGHT: 197.06 LBS

## 2023-10-30 DIAGNOSIS — R22.1 LOCALIZED SWELLING, MASS AND LUMP, NECK: ICD-10-CM

## 2023-10-30 DIAGNOSIS — Z00.00 ENCOUNTER FOR ANNUAL HEALTH EXAMINATION: Primary | ICD-10-CM

## 2023-10-30 PROCEDURE — 99999 PR PBB SHADOW E&M-EST. PATIENT-LVL III: ICD-10-PCS | Mod: PBBFAC,,, | Performed by: INTERNAL MEDICINE

## 2023-10-30 PROCEDURE — 99999 PR PBB SHADOW E&M-EST. PATIENT-LVL III: CPT | Mod: PBBFAC,,, | Performed by: INTERNAL MEDICINE

## 2023-10-30 PROCEDURE — 3008F BODY MASS INDEX DOCD: CPT | Mod: CPTII,S$GLB,, | Performed by: INTERNAL MEDICINE

## 2023-10-30 PROCEDURE — 76536 US SOFT TISSUE HEAD NECK THYROID: ICD-10-PCS | Mod: 26,,, | Performed by: RADIOLOGY

## 2023-10-30 PROCEDURE — 3075F PR MOST RECENT SYSTOLIC BLOOD PRESS GE 130-139MM HG: ICD-10-PCS | Mod: CPTII,S$GLB,, | Performed by: INTERNAL MEDICINE

## 2023-10-30 PROCEDURE — 99395 PREV VISIT EST AGE 18-39: CPT | Mod: S$GLB,,, | Performed by: INTERNAL MEDICINE

## 2023-10-30 PROCEDURE — 76536 US EXAM OF HEAD AND NECK: CPT | Mod: TC

## 2023-10-30 PROCEDURE — 3008F PR BODY MASS INDEX (BMI) DOCUMENTED: ICD-10-PCS | Mod: CPTII,S$GLB,, | Performed by: INTERNAL MEDICINE

## 2023-10-30 PROCEDURE — 76536 US EXAM OF HEAD AND NECK: CPT | Mod: 26,,, | Performed by: RADIOLOGY

## 2023-10-30 PROCEDURE — 3079F PR MOST RECENT DIASTOLIC BLOOD PRESSURE 80-89 MM HG: ICD-10-PCS | Mod: CPTII,S$GLB,, | Performed by: INTERNAL MEDICINE

## 2023-10-30 PROCEDURE — 99395 PR PREVENTIVE VISIT,EST,18-39: ICD-10-PCS | Mod: S$GLB,,, | Performed by: INTERNAL MEDICINE

## 2023-10-30 PROCEDURE — 3079F DIAST BP 80-89 MM HG: CPT | Mod: CPTII,S$GLB,, | Performed by: INTERNAL MEDICINE

## 2023-10-30 PROCEDURE — 3075F SYST BP GE 130 - 139MM HG: CPT | Mod: CPTII,S$GLB,, | Performed by: INTERNAL MEDICINE

## 2023-10-30 RX ORDER — PROGESTERONE 200 MG/1
200 CAPSULE ORAL NIGHTLY
COMMUNITY
Start: 2023-10-22

## 2023-10-30 RX ORDER — LETROZOLE 2.5 MG/1
TABLET, FILM COATED ORAL
COMMUNITY
Start: 2023-10-10

## 2023-10-30 RX ORDER — METFORMIN HYDROCHLORIDE 500 MG/1
500 TABLET ORAL 2 TIMES DAILY WITH MEALS
COMMUNITY

## 2023-10-30 NOTE — PROGRESS NOTES
Subjective:       Patient ID: Klarissa Brown is a 38 y.o. female.    Chief Complaint: Annual Exam    HPI  Presents for annual.     Swelling in right neck for the last 2/5 weeks. First noticed after an acupuncture session. Had chills and night sweats 2 nights ago but otherwise has been feeling well.     She has been undergoing fertility treatments with [facility]. Labs revealed A1C of 5.8 and she was started on metformin.     PMHx:   HSV  Hx of positive HPV in 2018 which cleared in 2020 and mildly abnormal pap in 2020.    Prediabetes: now on metformin as above.     Surgeries: Knee surgery in 2020      Social:  with one chlid. Works as . No smoking or drug use.      Health Maintenance:  Mammogram: Had breast US in 2020 with lump and repeat showed no growth and no suspicious features. Will start annual mammograms in 2024.   HIV: negative in 2020   Hep C: negative in 2020   Lipids: Order today   Pap Smear: Hx of positive HPV in 2018 which cleared in 2020 and mildly  abnormal pap in 2020. Most recent PAP in 2022 was normal.   Vaccines: flu shot today, otherwise up to date with vaccines       Review of Systems   Constitutional:  Negative for activity change, appetite change and chills.   HENT:  Negative for ear pain, sinus pressure/congestion and sneezing.    Respiratory:  Negative for cough and shortness of breath.    Cardiovascular:  Negative for chest pain, palpitations and leg swelling.   Gastrointestinal:  Negative for abdominal distention, abdominal pain, constipation, diarrhea, nausea and vomiting.   Genitourinary:  Negative for dysuria and hematuria.   Musculoskeletal:  Negative for arthralgias, back pain and myalgias.   Neurological:  Negative for dizziness and headaches.   Psychiatric/Behavioral:  Negative for agitation. The patient is not nervous/anxious.            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)     Has not had an outbreak  "in "a few years"    HSV (herpes simplex virus) anogenital infection     Last outbreak was about 3-4 years ago     Past Surgical History:   Procedure Laterality Date    ARTHROSCOPIC CHONDROPLASTY OF KNEE JOINT Right 10/10/2019    Procedure: ARTHROSCOPY, KNEE, WITH CHONDROPLASTY;  Surgeon: Lázaro Cody MD;  Location: [facility] OR;  Service: Orthopedics;  Laterality: Right;    FRACTURE SURGERY  Acl repair 2019    HERNIA REPAIR      as 1 year old    KNEE ARTHROSCOPY W/ ACL RECONSTRUCTION Right 10/10/2019    Procedure: RECONSTRUCTION, KNEE, ACL, ARTHROSCOPIC;  Surgeon: Lázaro Cody MD;  Location: [facility] OR;  Service: Orthopedics;  Laterality: Right;  Adductor,Exparel-Bupivacaine Liposome Injection 30cc,Clonidine/Epi/Ketorolac/Ropivacaine Injection 30cc  Arthroscopy equipment,Knee arthrotomy set,Mitek TrueSpan(curved),Mitek Vapor,Mitek adjustable rigidloop,Hamstring autograft,Muscle Stimulator,Ochsne D    SYNOVECTOMY OF KNEE Right 10/10/2019    Procedure: SYNOVECTOMY, KNEE;  Surgeon: Lázaro Cody MD;  Location: [facility] OR;  Service: Orthopedics;  Laterality: Right;      Patient Active Problem List   Diagnosis    Right knee pain    Decreased range of motion (ROM) of right knee    Muscle weakness of lower extremity    HSV (herpes simplex virus) anogenital infection    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   HENT:      Head: Normocephalic.   Cardiovascular:      Rate and Rhythm: Normal rate and regular rhythm.      Pulses: Normal pulses.      Heart sounds: Normal heart sounds.   Pulmonary:      Effort: Pulmonary effort is normal.      Breath sounds: Normal breath sounds.   Abdominal:      General: Abdomen is flat. Bowel sounds are normal.      Palpations: Abdomen is soft.   Musculoskeletal:         General: Normal range of motion.      Cervical back: Normal range of motion and neck supple.     "  Comments: Neck fullness on right side. No erythema. Mildly tender to palpation    Skin:     General: Skin is warm and dry.   Neurological:      General: No focal deficit present.      Mental Status: She is alert and oriented to person, place, and time.   Psychiatric:         Mood and Affect: Mood normal.         Assessment:       Problem List Items Addressed This Visit    None  Visit Diagnoses       Encounter for annual health examination    -  Primary    Localized swelling, mass and lump, neck        Relevant Orders    US Soft Tissue Head Neck Thyroid            Plan:         Klarissa was seen today for annual exam.    Diagnoses and all orders for this visit:    Encounter for annual health examination  Mammogram: Had breast US in 2020 with lump and repeat showed no growth and no suspicious features. Will start annual mammograms in 2024.   HIV: negative in 2020   Hep C: negative in 2020   Lipids: Order today   Pap Smear: Hx of positive HPV in 2018 which cleared in 2020 and mildly  abnormal pap in 2020. Most recent PAP in 2022 was normal.   Vaccines: flu shot today, otherwise up to date with vaccines     Localized swelling, mass and lump, neck  -     US Soft Tissue Head Neck Thyroid; Future  Unclear etiology. Will check US for further eval./                Huma Brunner MD   Internal Medicine   Primary Care

## 2023-10-31 ENCOUNTER — PATIENT MESSAGE (OUTPATIENT)
Dept: INTERNAL MEDICINE | Facility: CLINIC | Age: 38
End: 2023-10-31
Payer: COMMERCIAL

## 2023-10-31 ENCOUNTER — TELEPHONE (OUTPATIENT)
Dept: INTERNAL MEDICINE | Facility: CLINIC | Age: 38
End: 2023-10-31

## 2023-10-31 DIAGNOSIS — R59.0 LOCALIZED ENLARGED LYMPH NODES: Primary | ICD-10-CM

## 2023-10-31 NOTE — TELEPHONE ENCOUNTER
----- Message from Huma Brunner MD sent at 10/31/2023  2:16 PM CDT -----  Please help her schedule this CT scan and labs. Thanks

## 2023-11-28 ENCOUNTER — PATIENT MESSAGE (OUTPATIENT)
Dept: OBSTETRICS AND GYNECOLOGY | Facility: CLINIC | Age: 38
End: 2023-11-28
Payer: COMMERCIAL

## 2023-11-28 DIAGNOSIS — A60.9 HSV (HERPES SIMPLEX VIRUS) ANOGENITAL INFECTION: Primary | ICD-10-CM

## 2023-11-28 RX ORDER — VALACYCLOVIR HYDROCHLORIDE 500 MG/1
500 TABLET, FILM COATED ORAL DAILY
Qty: 30 TABLET | Refills: 0 | Status: SHIPPED | OUTPATIENT
Start: 2023-11-28 | End: 2024-01-30 | Stop reason: SDUPTHER

## 2024-01-16 NOTE — TELEPHONE ENCOUNTER
----- Message from Lisbeth Avila LPN sent at 11/27/2017  3:10 PM CST -----      ----- Message -----  From: Wesley Kimball  Sent: 11/27/2017  11:25 AM  To: Vasyl DICK Staff    Pt insurance will not cover pt rx VALACYCLOVIR THEY WILL COVER ACYCLOVIR [phone]  
I changed it.  
17

## 2024-01-30 DIAGNOSIS — A60.9 HSV (HERPES SIMPLEX VIRUS) ANOGENITAL INFECTION: ICD-10-CM

## 2024-01-31 RX ORDER — VALACYCLOVIR HYDROCHLORIDE 500 MG/1
500 TABLET, FILM COATED ORAL DAILY
Qty: 30 TABLET | Refills: 11 | Status: SHIPPED | OUTPATIENT
Start: 2024-01-31 | End: 2024-03-01

## 2024-03-27 DIAGNOSIS — Z12.31 OTHER SCREENING MAMMOGRAM: ICD-10-CM

## 2024-05-22 ENCOUNTER — HOSPITAL ENCOUNTER (OUTPATIENT)
Dept: RADIOLOGY | Facility: OTHER | Age: 39
Discharge: HOME | End: 2024-05-22
Attending: INTERNAL MEDICINE
Payer: COMMERCIAL

## 2024-05-22 DIAGNOSIS — Z12.31 OTHER SCREENING MAMMOGRAM: ICD-10-CM

## 2024-05-22 PROCEDURE — 77067 SCR MAMMO BI INCL CAD: CPT | Mod: 26,,, | Performed by: RADIOLOGY

## 2024-05-22 PROCEDURE — 77063 BREAST TOMOSYNTHESIS BI: CPT | Mod: 26,,, | Performed by: RADIOLOGY

## 2024-05-22 PROCEDURE — 77067 SCR MAMMO BI INCL CAD: CPT | Mod: TC

## 2024-05-29 ENCOUNTER — PATIENT MESSAGE (OUTPATIENT)
Dept: INTERNAL MEDICINE | Facility: CLINIC | Age: 39
End: 2024-05-29
Payer: COMMERCIAL

## 2024-06-10 ENCOUNTER — PATIENT MESSAGE (OUTPATIENT)
Dept: INTERNAL MEDICINE | Facility: CLINIC | Age: 39
End: 2024-06-10
Payer: COMMERCIAL

## 2024-09-18 NOTE — ANESTHESIA PREPROCEDURE EVALUATION
"                                                                                                             10/02/2019  Klarissa Brown is a 34 y.o., female.    Anesthesia Evaluation         Review of Systems  Anesthesia Hx:  No problems with previous Anesthesia  Denies Personal Hx of Anesthesia complications.   Social:  Social Alcohol Use "SOCIAL" SMOKER   Hematology/Oncology:  Hematology Normal   Oncology Normal     EENT/Dental:EENT/Dental Normal   Cardiovascular:    Denies Angina.  Functional Capacity 6 METS    Pulmonary:   Denies Shortness of breath.  Denies Recent URI.    Renal/:  Renal/ Normal     Hepatic/GI:  Hepatic/GI Normal    Musculoskeletal:  Musculoskeletal Normal    Neurological:  Neurology Normal    Endocrine:  Endocrine Normal    Dermatological:  Skin Normal    Psych:  Psychiatric Normal           Physical Exam  General:  Well nourished    Airway/Jaw/Neck:  Airway Findings: Mouth Opening: Normal Tongue: Normal  General Airway Assessment: Average  Mallampati: III  Improves to II with phonation.  TM Distance: Normal, at least 6 cm  Jaw/Neck Findings:  Neck ROM: Normal ROM            Mental Status:  Mental Status Findings:  Alert and Oriented       Patient Active Problem List   Diagnosis    Residual hemorrhoidal skin tags    Rupture of anterior cruciate ligament of right knee    Right knee pain    Acute pain of right knee         Anesthesia Plan  Type of Anesthesia, risks & benefits discussed:  Anesthesia Type:  general, MAC, regional  Patient's Preference:   Intra-op Monitoring Plan: standard ASA monitors  Intra-op Monitoring Plan Comments:   Post Op Pain Control Plan:   Post Op Pain Control Plan Comments:   Induction:   IV  Beta Blocker:  Patient is not currently on a Beta-Blocker (No further documentation required).       Informed Consent: Patient understands risks and agrees with Anesthesia plan.  Questions answered. Anesthesia consent signed with patient.  ASA Score: 2     Day of " Endoscopy Surgery Review of History & Physical:    H&P update referred to the surgeon.         Ready For Surgery From Anesthesia Perspective.

## 2024-10-18 ENCOUNTER — PATIENT MESSAGE (OUTPATIENT)
Dept: INTERNAL MEDICINE | Facility: CLINIC | Age: 39
End: 2024-10-18

## 2024-10-18 ENCOUNTER — OFFICE VISIT (OUTPATIENT)
Dept: INTERNAL MEDICINE | Facility: CLINIC | Age: 39
End: 2024-10-18
Payer: COMMERCIAL

## 2024-10-18 VITALS
HEIGHT: 67 IN | WEIGHT: 207.25 LBS | OXYGEN SATURATION: 96 % | BODY MASS INDEX: 32.53 KG/M2 | DIASTOLIC BLOOD PRESSURE: 81 MMHG | SYSTOLIC BLOOD PRESSURE: 125 MMHG | HEART RATE: 78 BPM

## 2024-10-18 DIAGNOSIS — Z00.00 ENCOUNTER FOR ANNUAL PHYSICAL EXAM: Primary | ICD-10-CM

## 2024-10-18 PROCEDURE — 99999 PR PBB SHADOW E&M-EST. PATIENT-LVL III: CPT | Mod: PBBFAC,,, | Performed by: INTERNAL MEDICINE

## 2024-10-18 NOTE — PROGRESS NOTES
Subjective:       Patient ID: Klarissa Brown is a 39 y.o. female.    Chief Complaint: Annual Exam    HPI    Presents for annual.     She has been undergoing fertility treatments with [facility].     Otherwise feeling well with no new complaints    PMHx:   HSV  Hx of positive HPV in 2018 which cleared in 2020 and mildly abnormal pap in 2020.      Prediabetes:on metformin .  Most recent A1c drawn by the fertility clinic was back in normal range    Surgeries: Knee surgery in 2020      Social:  with one chlid. Works as . No smoking or drug use.      Health Maintenance:  Mammogram: Had breast US in 2020 with lump and repeat showed no growth and no suspicious features. Normal Mammogram May 2024.   HIV: negative in 2020   Hep C: negative in 2020   Lipids: normal 2023   Pap Smear: Hx of positive HPV in 2018 which cleared in 2020 and mildly  abnormal pap in 2020. Most recent PAP in 2022 was normal.   Vaccines: flu shot today, otherwise up to date with vaccines         Review of Systems   Constitutional:  Negative for activity change, appetite change, chills and unexpected weight change.   HENT:  Negative for ear pain, hearing loss, rhinorrhea, sinus pressure/congestion, sneezing and trouble swallowing.    Eyes:  Negative for discharge and visual disturbance.   Respiratory:  Negative for cough, chest tightness, shortness of breath and wheezing.    Cardiovascular:  Negative for chest pain, palpitations and leg swelling.   Gastrointestinal:  Negative for abdominal distention, abdominal pain, blood in stool, constipation, diarrhea, nausea and vomiting.   Endocrine: Negative for polydipsia and polyuria.   Genitourinary:  Negative for difficulty urinating, dysuria, hematuria and menstrual problem.   Musculoskeletal:  Negative for arthralgias, back pain, joint swelling, myalgias and neck pain.   Neurological:  Negative for dizziness, weakness and headaches.   Psychiatric/Behavioral:  Negative for  "agitation, confusion and dysphoric mood. The patient is not nervous/anxious.            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)     Has not had an outbreak in "a few years"    HSV (herpes simplex virus) anogenital infection     Last outbreak was about 3-4 years ago     Past Surgical History:   Procedure Laterality Date    ARTHROSCOPIC CHONDROPLASTY OF KNEE JOINT Right 10/10/2019    Procedure: ARTHROSCOPY, KNEE, WITH CHONDROPLASTY;  Surgeon: Lázaro Cody MD;  Location: [facility] OR;  Service: Orthopedics;  Laterality: Right;    FRACTURE SURGERY  Acl repair 2019    HERNIA REPAIR      as 1 year old    KNEE ARTHROSCOPY W/ ACL RECONSTRUCTION Right 10/10/2019    Procedure: RECONSTRUCTION, KNEE, ACL, ARTHROSCOPIC;  Surgeon: Lázaro Cody MD;  Location: [facility] OR;  Service: Orthopedics;  Laterality: Right;  Adductor,Exparel-Bupivacaine Liposome Injection 30cc,Clonidine/Epi/Ketorolac/Ropivacaine Injection 30cc  Arthroscopy equipment,Knee arthrotomy set,Mitek TrueSpan(curved),Mitek Vapor,Mitek adjustable rigidloop,Hamstring autograft,Muscle Stimulator,Ochsne D    SYNOVECTOMY OF KNEE Right 10/10/2019    Procedure: SYNOVECTOMY, KNEE;  Surgeon: Lázaro Cody MD;  Location: [facility] OR;  Service: Orthopedics;  Laterality: Right;      Patient Active Problem List   Diagnosis    Right knee pain    Decreased range of motion (ROM) of right knee    Muscle weakness of lower extremity    HSV (herpes simplex virus) anogenital infection    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   HENT:      Head: Normocephalic.   Cardiovascular:      Rate and Rhythm: Normal rate and regular rhythm.      Pulses: Normal pulses.      Heart sounds: Normal heart sounds.   Pulmonary:      Effort: Pulmonary effort is normal.      Breath sounds: Normal breath sounds.   Abdominal:      " General: Abdomen is flat. Bowel sounds are normal.      Palpations: Abdomen is soft.   Musculoskeletal:         General: Normal range of motion.      Cervical back: Normal range of motion and neck supple.      Comments: Neck fullness on right side. No erythema. Mildly tender to palpation    Skin:     General: Skin is warm and dry.   Neurological:      General: No focal deficit present.      Mental Status: She is alert and oriented to person, place, and time.   Psychiatric:         Mood and Affect: Mood normal.         Assessment:       Problem List Items Addressed This Visit    None        Plan:         Klarissa was seen today for annual exam.    Diagnoses and all orders for this visit:    Encounter for annual health examination  Mammogram: Had breast US in 2020 with lump and repeat showed no growth and no suspicious features. Normal Mammogram May 2024.   HIV: negative in 2020   Hep C: negative in 2020   Lipids: normal 2023   Pap Smear: Hx of positive HPV in 2018 which cleared in 2020 and mildly  abnormal pap in 2020. Most recent PAP in 2022 was normal.   Vaccines: flu shot today, otherwise up to date with vaccines   Annual wellness exam completed.     All medications, histories, and concerns reviewed, reconciled, and addressed.     Appropriate Screenings per pt's sex and age have been reviewed and discussed with pt.           Huma Brunner MD   Internal Medicine   Primary Care

## 2025-01-14 ENCOUNTER — TELEPHONE (OUTPATIENT)
Dept: SPORTS MEDICINE | Facility: CLINIC | Age: 40
End: 2025-01-14
Payer: COMMERCIAL

## 2025-01-14 NOTE — TELEPHONE ENCOUNTER
Spoke to the patient in regard to PT referral from Dr. Cody. While on the call I informed the patient that she has to be seen by Dr. Cody or one of his physician assistants before a PT order can be placed because she has not been seen in our office since 2020. The patient accepted an appt on 1/17 at 2pm with Adama Villegas PA-C at the [place] location.

## 2025-01-17 ENCOUNTER — HOSPITAL ENCOUNTER (OUTPATIENT)
Dept: RADIOLOGY | Facility: HOSPITAL | Age: 40
Discharge: HOME | End: 2025-01-17
Attending: PHYSICIAN ASSISTANT
Payer: COMMERCIAL

## 2025-01-17 ENCOUNTER — OFFICE VISIT (OUTPATIENT)
Dept: SPORTS MEDICINE | Facility: CLINIC | Age: 40
End: 2025-01-17
Payer: COMMERCIAL

## 2025-01-17 VITALS
WEIGHT: 210.75 LBS | DIASTOLIC BLOOD PRESSURE: 78 MMHG | BODY MASS INDEX: 33.08 KG/M2 | SYSTOLIC BLOOD PRESSURE: 112 MMHG | HEIGHT: 67 IN

## 2025-01-17 DIAGNOSIS — M25.561 RIGHT KNEE PAIN, UNSPECIFIED CHRONICITY: ICD-10-CM

## 2025-01-17 DIAGNOSIS — Z98.890 S/P ACL RECONSTRUCTION: Primary | ICD-10-CM

## 2025-01-17 PROCEDURE — 1159F MED LIST DOCD IN RCRD: CPT | Mod: CPTII,S$GLB,, | Performed by: PHYSICIAN ASSISTANT

## 2025-01-17 PROCEDURE — 1160F RVW MEDS BY RX/DR IN RCRD: CPT | Mod: CPTII,S$GLB,, | Performed by: PHYSICIAN ASSISTANT

## 2025-01-17 PROCEDURE — 99999 PR PBB SHADOW E&M-EST. PATIENT-LVL III: CPT | Mod: PBBFAC,,, | Performed by: PHYSICIAN ASSISTANT

## 2025-01-17 PROCEDURE — 3074F SYST BP LT 130 MM HG: CPT | Mod: CPTII,S$GLB,, | Performed by: PHYSICIAN ASSISTANT

## 2025-01-17 PROCEDURE — 3008F BODY MASS INDEX DOCD: CPT | Mod: CPTII,S$GLB,, | Performed by: PHYSICIAN ASSISTANT

## 2025-01-17 PROCEDURE — 99203 OFFICE O/P NEW LOW 30 MIN: CPT | Mod: S$GLB,,, | Performed by: PHYSICIAN ASSISTANT

## 2025-01-17 PROCEDURE — 3078F DIAST BP <80 MM HG: CPT | Mod: CPTII,S$GLB,, | Performed by: PHYSICIAN ASSISTANT

## 2025-01-17 NOTE — PROGRESS NOTES
Subjective:     Chief Complaint: Klarissa Brown is a 39 y.o. female who had concerns including Injury of the Right Knee (Surgery in 2019 wants referral to PT for pain with rt knee. ).    History of Present Illness    CHIEF COMPLAINT:  - Right knee pain and physical therapy referral    HPI:  Klarissa presents to the clinic for evaluation of right knee pain and to obtain a referral for physical therapy, status post ACL reconstruction with hamstring graft. She reports intermittent pain in the area where she previously had surgery. The pain is not constant, and she states it has not been bothering her the past few days. She describes the sensation as similar to the weakness she felt immediately after her surgery when she would overexert herself.    Pain is more noticeable with certain activities, particularly when using stairs. She notices it more with stairs, that kind of thing, like doing steps. It does not hurt always, but going up and down steps is where she feels it's a little bit weaker or it feels a little irritated after doing step ups. The irritation is more on the front of her knee.    Klarissa recalls a specific incident during the summer when she felt a twinge in her knee while carrying her child up the stairs. She is concerned about whether this could have caused any damage.    She recently increased her exercise routine, which may be contributing to her symptoms. She attends a gym called [employer] twice weekly for weight lifting and participates in classes similar to [employer], though less intense. On other days, she engages in walking and occasional swimming.    Her primary goal for the visit is to obtain guidance on whether current symptoms are normal given her increased activity level, and to receive recommendations for exercises or techniques to address her knee discomfort.    Klarissa denies feeling any instability in the knee, pain when pressure is applied to specific areas of the knee, or irritation in  the back of the knee. Klarissa denies any formal medical diagnoses related to her current knee symptoms.    PREVIOUS TREATMENTS:  - Klarissa had ACL reconstruction surgery with a hamstring graft in the past. The surgery involved double bundle graft placement with two tunnel placements in the femur and one tunnel in the tibia.  - Klarissa previously did physical therapy after the ACL surgery.    SURGICAL HISTORY:  - ACL reconstruction with hamstring graft      ROS:  General: negative fever, negative chills, negative fatigue, negative weight gain, negative weight loss  Eyes: negative vision changes, negative redness, negative discharge  ENT: negative ear pain, negative nasal congestion, negative sore throat  Cardiovascular: negative chest pain, negative palpitations, negative lower extremity edema  Respiratory: negative cough, negative shortness of breath  Gastrointestinal: negative abdominal pain, negative nausea, negative vomiting, negative diarrhea, negative constipation, negative blood in stool  Genitourinary: negative dysuria, negative hematuria, negative frequency  Musculoskeletal: +joint pain, negative muscle pain  Skin: negative rash, negative lesion  Neurological: negative headache, negative dizziness, negative numbness, negative tingling  Psychiatric: negative anxiety, negative depression, negative sleep difficulty              DATE OF PROCEDURE: 10/10/2019     ATTENDING SURGEON: Surgeon(s) and Role:     * Lázaro Cody MD - Primary     Assistants:  Hill Smith MD - Fellow  Katt Smith PA-C     PREOPERATIVE DIAGNOSIS:  Right  Anterior Cruciate Ligament Tear S83.510     POSTOPERATIVE DIAGNOSIS:   Right  Anterior Cruciate Ligament Tear S83.510     PROCEDURES(S) PERFORMED:   1. Right  Arthroscopy, anterior cruciate ligament reconstruction 43569  2.  Right  Arthroscopy, knee, synovectomy, limited 16303     ANESTHESIA: Spinal and Adductor Block with catheter  Local anesthetic:  30  ml 0.5% ropivicaine mixture      FLUIDS IN THE CASE: 1000 ml     ESTIMATED BLOOD LOSS: Minimal     URINE OUTPUT: 0 ml     COMPLICATIONS: none     CONDITION ON RETURN TO RECOVERY ROOM: Good     IMPLANTS UTILIZED:  Mitek rigidfix/intrafix  Mitek rigid loop adjustable cortical implant standard x 2  Mitek Bio-Intrafix 8-10 x 30 mm tapered screw  Mitek Bio-Intrafix Large tibial sheath        GRAFT SOURCE:  Hamstring auto graft        Past Surgical History:   Procedure Laterality Date    ARTHROSCOPIC CHONDROPLASTY OF KNEE JOINT Right 10/10/2019    Procedure: ARTHROSCOPY, KNEE, WITH CHONDROPLASTY;  Surgeon: Lázaro Cody MD;  Location: [facility] OR;  Service: Orthopedics;  Laterality: Right;    FRACTURE SURGERY  Acl repair 2019    HERNIA REPAIR      as 1 year old    KNEE ARTHROSCOPY W/ ACL RECONSTRUCTION Right 10/10/2019    Procedure: RECONSTRUCTION, KNEE, ACL, ARTHROSCOPIC;  Surgeon: Lázaro Cody MD;  Location: [facility] OR;  Service: Orthopedics;  Laterality: Right;  Adductor,Exparel-Bupivacaine Liposome Injection 30cc,Clonidine/Epi/Ketorolac/Ropivacaine Injection 30cc  Arthroscopy equipment,Knee arthrotomy set,Mitek TrueSpan(curved),Mitek Vapor,Mitek adjustable rigidloop,Hamstring autograft,Muscle Stimulator,Ochsne D    SYNOVECTOMY OF KNEE Right 10/10/2019    Procedure: SYNOVECTOMY, KNEE;  Surgeon: Lázaro Cody MD;  Location: [facility] OR;  Service: Orthopedics;  Laterality: Right;       Objective:     General: Klarissa is well-developed, well-nourished, appears stated age, in no acute distress, alert and oriented to time, place and person.     General    Nursing note and vitals reviewed.  Constitutional: She is oriented to person, place, and time. She appears well-developed and well-nourished. No distress.   Eyes: Conjunctivae and EOM are normal. Pupils are equal, round, and reactive to light.   Neck: Neck supple. No JVD present.   Cardiovascular:  Normal heart sounds and intact distal pulses.            No murmur heard.  Pulmonary/Chest: Effort normal and  breath sounds normal. No respiratory distress.   Abdominal: Soft. Bowel sounds are normal. She exhibits no distension. There is no abdominal tenderness.   Neurological: She is alert and oriented to person, place, and time. Coordination normal.   Psychiatric: She has a normal mood and affect. Her behavior is normal. Judgment and thought content normal.     General Musculoskeletal Exam   Gait: normal       Right Knee Exam     Inspection   Erythema: absent  Scars: present  Swelling: absent  Effusion: absent  Deformity: absent  Bruising: absent    Range of Motion   Extension:  0   Flexion:  130     Tests   Meniscus   Jennifer:  Medial - negative Lateral - negative  Ligament Examination   Lachman: normal (-1 to 2mm)   PCL-Posterior Drawer: normal (0 to 2mm)     MCL - Valgus: normal (0 to 2mm)  LCL - Varus: normal  Dial Test at 90 degrees: normal (< 5 degrees)  Posterolateral Corner: stable  Patella   Patellar Glide (quadrants): Lateral - 1   Medial - 2  Patellar Grind: negative    Other   Popliteal (Baker's) Cyst: absent  Sensation: normal    Left Knee Exam     Inspection   Erythema: absent  Scars: absent  Swelling: absent  Effusion: absent  Deformity: absent  Bruising: absent    Range of Motion   Extension:  0   Flexion:  130     Tests   Meniscus   Jennifer:  Medial - negative Lateral - negative  Stability   Lachman: normal (-1 to 2mm)   PCL-Posterior Drawer: normal (0 to 2mm)  MCL - Valgus: normal (0 to 2mm)  LCL - Varus: normal (0 to 2mm)  Dial Test at 90 degrees: normal (< 5 degrees)  Posterolateral Corner: stable  Patella   Patellar Glide (Quadrants): Lateral - 1 Medial - 2  Patellar Grind: negative    Other   Popliteal (Baker's) Cyst: absent  Sensation: normal    Right Hip Exam     Tests   Eloisa: negative  Left Hip Exam     Tests   Eloisa: negative          Muscle Strength   Right Lower Extremity   Hip Abduction: 5/5   Quadriceps:  4/5   Hamstrin/5   Left Lower Extremity   Hip Abduction: 5/5   Quadriceps:  5/5    Hamstrin/5     Vascular Exam     Right Pulses  Dorsalis Pedis:      2+  Posterior Tibial:      2+        Left Pulses  Dorsalis Pedis:      2+  Posterior Tibial:      2+        Edema  Right Lower Leg: absent  Left Lower Leg: absent        Declined Radiographs today.      Assessment:     Encounter Diagnoses   Name Primary?    Right knee pain, unspecified chronicity     S/P ACL reconstruction Yes        Plan:     Assessment & Plan    PROCEDURES:  1. No procedures were performed or explicitly discussed during this visit.    REFERRALS:  1. Referred to physical therapy for knee pain related to previous ACL reconstruction.    FOLLOW UP:  1. Follow up in 6-8 weeks if issues persist after physical therapy.           All of the patient's questions were answered and the patient will contact us if they have any questions or concerns in the interim.    This note was generated with the assistance of ambient listening technology. Verbal consent was obtained by the patient and accompanying visitor(s) for the recording of patient appointment to facilitate this note. I attest to having reviewed and edited the generated note for accuracy, though some syntax or spelling errors may persist. Please contact the author of this note for any clarification.

## 2025-02-06 ENCOUNTER — CLINICAL SUPPORT (OUTPATIENT)
Dept: REHABILITATION | Facility: HOSPITAL | Age: 40
End: 2025-02-06
Attending: PHYSICIAN ASSISTANT
Payer: COMMERCIAL

## 2025-02-06 DIAGNOSIS — Z98.890 S/P ACL RECONSTRUCTION: ICD-10-CM

## 2025-02-06 DIAGNOSIS — M25.561 CHRONIC PAIN OF RIGHT KNEE: Primary | ICD-10-CM

## 2025-02-06 DIAGNOSIS — M25.561 RIGHT KNEE PAIN, UNSPECIFIED CHRONICITY: ICD-10-CM

## 2025-02-06 DIAGNOSIS — G89.29 CHRONIC PAIN OF RIGHT KNEE: Primary | ICD-10-CM

## 2025-02-06 PROCEDURE — 97161 PT EVAL LOW COMPLEX 20 MIN: CPT | Performed by: PHYSICAL THERAPIST

## 2025-02-06 PROCEDURE — 97112 NEUROMUSCULAR REEDUCATION: CPT | Performed by: PHYSICAL THERAPIST

## 2025-02-06 NOTE — PROGRESS NOTES
Outpatient Rehab    Physical Therapy Evaluation    Patient Name: Guilherme Brown  MRN: [id number]  YOB: 1985  Today's Date: 2/6/2025    Therapy Diagnosis:   Encounter Diagnoses   Name Primary?    Right knee pain, unspecified chronicity     S/P ACL reconstruction     Chronic pain of right knee Yes     Physician: Torres Villegas, *    Physician Orders: Eval and Treat  Medical Diagnosis: Right knee pain, unspecified chronicity [M25.561], S/P ACL reconstruction [Z98.890]     Visit # / Visits Authorized:  1 / 1   Date of Evaluation:  2/6/2025   Insurance Authorization Period: 1/17/25 to 1/17/26  Plan of Care Certification:  2/6/2025 to 5/29/25      Time In: 0900   Time Out: 1000  Total Time: 60   Total Billable Time: 60         Subjective   History of Present Illness  Guilherme is a 39 y.o. female who reports to physical therapy with a chief concern of R knee pain. 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 Guilherme has a past surgical history that includes Hernia repair; Knee arthroscopy w/ ACL reconstruction (Right, 10/10/2019); Arthroscopic chondroplasty of knee joint (Right, 10/10/2019); Synovectomy of knee (Right, 10/10/2019); and Fracture surgery (Acl repair 2019).    The patient reports a medical diagnosis of Right knee pain, unspecified chronicity (M25.561), S/P ACL reconstruction (Z98.890).  Patient reports a surgery of s/p ACLr 2019. Surgery occurred on 10/10/19. Diagnostic tests related to this condition: None.        Dominant Hand: Right  History of Present Condition/Illness: Pt reports following her R ACLr she had progressed through therapy fairly well with minor pains in R knee. Pt reports she nearly achieved PLOF prior to discharge. Pt reports she initiated a strengthening routine consisting of crossfit style resistance training and cardio 2x/week. Pt endorses  inc pain up under knee cap with squatting, plyometrics and heavier loads. Pt reports eventually pain started affecting stair navigation, walking and stairs. Pt would like to strengthening leg without pain, jog and train for 1/2 marathon, navigate stairs and play soccer recreationally without pain.     Pain     Patient reports a current pain level of 0/10. Pain at best is reported as 0/10. Pain at worst is reported as 7/10.   Location: R knee  Clinical Progression (since onset): Stable  Pain Qualities: Aching, Dull, Sharp, Tenderness, Tightness, Pressure  Pain-Relieving Factors: Ice, Rest  Pain-Aggravating Factors: Bending, Lifting, Running, Movement, Squatting, Sports, Stair climbing, Transfers, Walking         Living Arrangements  Living Situation  Housing: Home independently  Living Arrangements: Spouse/significant other, Children  Support Systems: Spouse/significant other    Home Setup  Type of Structure: House        Employment  Patient does not report that: Does the patient's condition impact their ability to work?  Employment Status: Employed full-time   Desk work      Past Medical History/Physical Systems Review:   Klarissa Brown  has a past medical history of Genital warts due to HPV (human papillomavirus) and HSV (herpes simplex virus) anogenital infection.    Klarissa Brown  has a past surgical history that includes Hernia repair; Knee arthroscopy w/ ACL reconstruction (Right, 10/10/2019); Arthroscopic chondroplasty of knee joint (Right, 10/10/2019); Synovectomy of knee (Right, 10/10/2019); and Fracture surgery (Acl repair 2019).    Klarissa has a current medication list which includes the following prescription(s): fluconazole, letrozole, metformin, progesterone, and valacyclovir.    Review of patient's allergies indicates:  No Known Allergies     Objective       Knee Range of Motion   Right Knee   Active (deg) Passive (deg) Pain   Flexion 140 140     Extension 5 5  (hyper)       Left Knee    Active (deg) Passive (deg) Pain   Flexion 140 140     Extension 5 5  (hyper)                      Hip Strength - Planes of Motion   Right Strength Right Pain Left Strength Left  Pain   Flexion (L2)           Extension 3+   4-     ABduction 3+   4-     ADduction           Internal Rotation           External Rotation               Knee Strength   Right Strength Right Pain Left Strength Left  Pain   Flexion (S2)           Prone Flexion           Extension (L3)             Quad isometric strength assessed at 80 deg knee flexion: L = 40.9 kg; R = 31 kg    Ankle/Foot Strength - Planes of Motion   Right Strength Right Pain Left Strength Left  Pain   Dorsiflexion (L4)           Plantar Flexion (S1) 3+   3+     Inversion           Eversion           Great Toe Flexion           Great Toe Extension (L5)           Lesser Toes Flexion           Lesser Toes Extension                  Knee Special Tests  Knee Ligament Tests  Negative: Right Anterior Drawer, Left Anterior Drawer, Right Lachman, Left Lachman, Right Posterior Drawer, Left Posterior Drawer, Right Posterior Sag, and Left Posterior Sag  Negative: Right Valgus Stress at 0 Degrees, Left Valgus Stress at 0 Degrees, Right Varus Stress at 0 Degrees, Left Varus Stress at 0 Degrees, Right Valgus Stress at 30 Degrees, Left Valgus Stress at 30 Degrees, Left Varus Stress at 30 Degrees, and Right Varus Stress at 30 Degrees       Knee Meniscal Tests  Negative: Right Lateral Jennifer, Left Lateral Jennifer, Right Medial Jennifer, and Left Medial Jennifer                  Knee Patellar Screening       Right Patellar Mobility  Normal: Medial, Lateral, Superior, and Inferior  Left Patellar Mobility  Normal: Medial, Lateral, Superior, and Inferior              Unremarkable Mobility Test Results  Unremarkable: Left Leg Squat  Squat Testing     Observations  Right Side: Pain  Bilateral: Limited Ankle Dorsiflexion     Pain reported at parallel on R    Single Leg Squat Testing - Right  "Leg  The patient completed 5 squat repetitions on the right leg.   Observations: Pain          Pain reported at 20 deg knee flexion    Single Leg Squat Testing - Left Leg  The patient completed 5 squat repetitions on the left leg.                   Ambulation Assistance Required  Surface With  Assistive Device Without Assistive Device Details   Level   Independent      Uneven   Independent     Curb           Gait Analysis  Base of Support: Normal              Anterior Y balance reach  L = 52 cm  R = 48 cm (inc hip IR to achieve end range)    Intake Outcome Measure for FOTO Survey    Therapist reviewed FOTO scores for Guilherme Brown on 2/6/2025.   FOTO report - see Media section or FOTO account episode details.     Treatment:            Balance/Neuromuscular Re-Education  Balance/Neuromuscular Re-Education Activity 1: Quad isometric at 80 deg flexion 45" hold x4  Balance/Neuromuscular Re-Education Activity 2: Side steps GTB 2 laps  Balance/Neuromuscular Re-Education Activity 3: Calf raise eccentric 2x10 each  Balance/Neuromuscular Re-Education Activity 4: SLR 2x10 3" hold         Patient's spiritual, cultural, and educational needs considered and patient agreeable to plan of care and goals.     Assessment & Plan   Assessment  Guilherme presents with a condition of Low complexity.   Presentation of Symptoms: Stable  Will Comorbidities Impact Care: No       Functional Limitations: Activity tolerance, Squatting, Decreased ambulation distance/endurance, Functional mobility, Carrying objects  Impairments: Activity intolerance, Impaired balance, Impaired physical strength, Lack of appropriate home exercise program, Pain with functional activity, Weight-bearing intolerance  Personal Factors Affecting Prognosis: Pain, Physical limitations    Patient Goal for Therapy (PT): Be able to train for half marathon, play recreational soccer  Prognosis: Good  Assessment Details: Pt presents with decreased strength, decreased ROM, " decreased flexibility, faulty posture, and increased pain. Due to impairments, pt is unable to navigate stairs, amb for extended periods, exercise or play soccer without pain.     Plan  From a physical therapy perspective, the patient would benefit from: Skilled Rehab Services    Planned therapy interventions include: Therapeutic exercise, Therapeutic activities, Neuromuscular re-education, Manual therapy, and ADLs/IADLs.    Planned modalities to include: Biofeedback and Electrical stimulation - passive/unattended.        Visit Frequency: 1 times Per Week for 16 Weeks.       This plan was discussed with Patient.   Discussion participants: Agreed Upon Plan of Care             Goals:   Active       Long term goals       Pt will demonstrate 4 cm improvement in anterior Y balance reach score bilat reporting no pain (Progressing)       Start:  02/06/25    Expected End:  05/29/25            Pt will demonstrate no greater than 10% isometric strength discrepancy on R quad compared to L reporting no pain (Progressing)       Start:  02/06/25    Expected End:  05/29/25            Pt will be able to ascend/descend 2 flights of stairs reciprocally reporting no pain (Progressing)       Start:  02/06/25    Expected End:  05/29/25               Long term goals       Pt will be independent with return to run progression reporting no pain (Progressing)       Start:  02/06/25    Expected End:  05/29/25            Pt will be able to participate in group exercise class reporting no pain (Progressing)       Start:  02/06/25    Expected End:  05/29/25               Short term goals       Pt will demonstrate 3 kg improvement in quad isometric strength on R reporting no pain (Progressing)       Start:  02/06/25    Expected End:  03/20/25            Pt will be able to perform 5 SL squats to 30 deg knee flexion reporting no pain on R (Progressing)       Start:  02/06/25    Expected End:  03/20/25            Pt will be able to ascend 1 flight  of stairs reciprocally reporting less than 2/10 pain (Progressing)       Start:  02/06/25    Expected End:  03/20/25                Mukul Rousseau PT, DPT

## 2025-02-12 ENCOUNTER — CLINICAL SUPPORT (OUTPATIENT)
Dept: REHABILITATION | Facility: HOSPITAL | Age: 40
End: 2025-02-12
Payer: COMMERCIAL

## 2025-02-12 DIAGNOSIS — M25.561 CHRONIC PAIN OF RIGHT KNEE: Primary | ICD-10-CM

## 2025-02-12 DIAGNOSIS — G89.29 CHRONIC PAIN OF RIGHT KNEE: Primary | ICD-10-CM

## 2025-02-12 PROCEDURE — 97112 NEUROMUSCULAR REEDUCATION: CPT | Performed by: PHYSICAL THERAPIST

## 2025-02-12 PROCEDURE — 97530 THERAPEUTIC ACTIVITIES: CPT | Performed by: PHYSICAL THERAPIST

## 2025-02-12 PROCEDURE — 97140 MANUAL THERAPY 1/> REGIONS: CPT | Performed by: PHYSICAL THERAPIST

## 2025-02-12 NOTE — PROGRESS NOTES
"  Outpatient Rehab    Physical Therapy Visit    Patient Name: Guilherme Brown  MRN: [id number]  YOB: 1985  Today's Date: 2/14/2025    Therapy Diagnosis: No diagnosis found.  Physician: Torres Villegas, *    Physician Orders: Eval and Treat  Medical Diagnosis: Right knee pain, unspecified chronicity [M25.561], S/P ACL reconstruction [Z98.890]      Visit # / Visits Authorized:  1 / 10  Date of Evaluation:  2/6/2025   Insurance Authorization Period: 2/6/25-12/31/25  Plan of Care Certification:  2/6/2025 to 5/29/25                 Time In: 1205   Time Out: 1300  Total Time: 55   Total Billable Time: 55    FOTO:  Intake Score:  %  Survey Score 1:  %  Survey Score 2:  %         Subjective   pt reports no pain after eval, continued pain with stairs and extending knee in open chain. HEP going well.  Pain reported as 4/10. R knee    Objective          SL squat 4/10 pain on R  SL squat with McConnel taping 1/10 pain on R    Treatment:  Guilherme received therapeutic exercises to develop strength, endurance, ROM, flexibility, posture, and core stabilization for 0 minutes including:      Guilherme received the following manual therapy techniques: Joint mobilizations were applied to the: R knee for 10 minutes, including:    Patellar mobs all directions  McConnel taping for medial tilt/ medial glide    Guilherme participated in neuromuscular re-education activities to improve: Balance, Coordination, Kinesthetic, Sense, Proprioception, and Posture for 15 minutes. The following activities were included:    SLR 2# 3x10 3" hold  SL hip ER 2# 3x20  SL hip abd 2# 3x15  Side steps GTB 2 laps    Guilherme participated in dynamic functional therapeutic activities to improve functional performance for 30  minutes, including:    Upright bike for 7 min for LE endurance training  DL squat on slant board 4x10  4" step down 4x8  Forward lunge 4x10    Assessment & Plan   Assessment: Pt presents with full knee ROM, good hyperextension " actively able to maintain with SLR. Pt reports pain with SL squat, 75% reduction with taping applied to improve medial patellar tilt/ glide. Pt instructed on proper warm up for quad/ hip activation prior to exercise class. Will continue to progress quad/ hip strength as tolerated  Evaluation/Treatment Tolerance: Patient tolerated treatment well    Patient will continue to benefit from skilled outpatient physical therapy to address the deficits listed in the problem list box on initial evaluation, provide pt/family education and to maximize pt's level of independence in the home and community environment.     Patient's spiritual, cultural, and educational needs considered and patient agreeable to plan of care and goals.           Plan:  Continue progressing quad/ hip strength as tolerated    Goals:   Active       Long term goals       Pt will demonstrate 4 cm improvement in anterior Y balance reach score bilat reporting no pain (Progressing)       Start:  02/06/25    Expected End:  05/29/25            Pt will demonstrate no greater than 10% isometric strength discrepancy on R quad compared to L reporting no pain (Progressing)       Start:  02/06/25    Expected End:  05/29/25            Pt will be able to ascend/descend 2 flights of stairs reciprocally reporting no pain (Progressing)       Start:  02/06/25    Expected End:  05/29/25               Long term goals       Pt will be independent with return to run progression reporting no pain (Progressing)       Start:  02/06/25    Expected End:  05/29/25            Pt will be able to participate in group exercise class reporting no pain (Progressing)       Start:  02/06/25    Expected End:  05/29/25               Short term goals       Pt will demonstrate 3 kg improvement in quad isometric strength on R reporting no pain (Progressing)       Start:  02/06/25    Expected End:  03/20/25            Pt will be able to perform 5 SL squats to 30 deg knee flexion reporting no  pain on R (Progressing)       Start:  02/06/25    Expected End:  03/20/25            Pt will be able to ascend 1 flight of stairs reciprocally reporting less than 2/10 pain (Progressing)       Start:  02/06/25    Expected End:  03/20/25                Mukul Rousseau, PT, DPT

## 2025-02-20 ENCOUNTER — CLINICAL SUPPORT (OUTPATIENT)
Dept: REHABILITATION | Facility: HOSPITAL | Age: 40
End: 2025-02-20
Payer: COMMERCIAL

## 2025-02-20 DIAGNOSIS — G89.29 CHRONIC PAIN OF RIGHT KNEE: Primary | ICD-10-CM

## 2025-02-20 DIAGNOSIS — M25.561 CHRONIC PAIN OF RIGHT KNEE: Primary | ICD-10-CM

## 2025-02-20 PROCEDURE — 97112 NEUROMUSCULAR REEDUCATION: CPT | Performed by: PHYSICAL THERAPIST

## 2025-02-20 PROCEDURE — 97530 THERAPEUTIC ACTIVITIES: CPT | Performed by: PHYSICAL THERAPIST

## 2025-02-20 NOTE — PROGRESS NOTES
"  Outpatient Rehab    Physical Therapy Visit    Patient Name: Guilherme Brown  MRN: [id number]  YOB: 1985  Today's Date: 2/24/2025    Therapy Diagnosis:   Encounter Diagnosis   Name Primary?    Chronic pain of right knee Yes     Physician: Torres Villegas, *    Physician Orders: Eval and Treat  Medical Diagnosis: Right knee pain, unspecified chronicity [M25.561], S/P ACL reconstruction [Z98.890]      Visit # / Visits Authorized:  1 / 10  Date of Evaluation:  2/6/2025   Insurance Authorization Period: 2/6/25-12/31/25  Plan of Care Certification:  2/6/2025 to 5/29/25                 Time In:     Time Out:    Total Time:     Total Billable Time: 55    FOTO:  Intake Score:  %  Survey Score 1:  %  Survey Score 2:  %         Subjective           No soreness after last visit. Pt report she feels taping has improved sx but didn't stay on. Pt reports continued pain with stairs and loading squats/ lunges    Objective          SL squat 3/10 pain on R  SL squat with McConnel taping 1/10 pain on R    Treatment:  Guilherme received therapeutic exercises to develop strength, endurance, ROM, flexibility, posture, and core stabilization for 0 minutes including:      Guilherme received the following manual therapy techniques: Joint mobilizations were applied to the: R knee for 5 minutes, including:    McConnel taping for medial tilt/ medial glide    NP today  Patellar mobs all directions    Guilherme participated in neuromuscular re-education activities to improve: Balance, Coordination, Kinesthetic, Sense, Proprioception, and Posture for 15 minutes. The following activities were included:    SLR 3# 3x8 1" hold  SL hip abd 3# 3x15  Knee ext machine 15-17.5# 4x10    NP today  SL hip ER 2# 3x20  Side steps GTB 2 laps    Guilherme participated in dynamic functional therapeutic activities to improve functional performance for 25 minutes, including:    DL squat on slant board x20  SL squat on slant board 4x8  4" step down " 4x8  SLDL w/ slide disc 4x8      NP today  Upright bike for 7 min for LE endurance training  Forward lunge w/ band cue for knee valgus 4x10      Assessment & Plan   Assessment:       Pt continues to respond well to patellar medial tilt/ medial glide taping. Pt tolerates progressed loading well today. Introduced SLDL w/ slide disc assist for balance, pt reports good training effect in glute. Will continue to progress glute/ quad strengthening as tolerated    Patient will continue to benefit from skilled outpatient physical therapy to address the deficits listed in the problem list box on initial evaluation, provide pt/family education and to maximize pt's level of independence in the home and community environment.     Patient's spiritual, cultural, and educational needs considered and patient agreeable to plan of care and goals.           Plan:  Continue progressing quad/ hip strength as tolerated    Goals:   Active       Long term goals       Pt will demonstrate 4 cm improvement in anterior Y balance reach score bilat reporting no pain (Progressing)       Start:  02/06/25    Expected End:  05/29/25            Pt will demonstrate no greater than 10% isometric strength discrepancy on R quad compared to L reporting no pain (Progressing)       Start:  02/06/25    Expected End:  05/29/25            Pt will be able to ascend/descend 2 flights of stairs reciprocally reporting no pain (Progressing)       Start:  02/06/25    Expected End:  05/29/25               Long term goals       Pt will be independent with return to run progression reporting no pain (Progressing)       Start:  02/06/25    Expected End:  05/29/25            Pt will be able to participate in group exercise class reporting no pain (Progressing)       Start:  02/06/25    Expected End:  05/29/25               Short term goals       Pt will demonstrate 3 kg improvement in quad isometric strength on R reporting no pain (Progressing)       Start:  02/06/25     Expected End:  03/20/25            Pt will be able to perform 5 SL squats to 30 deg knee flexion reporting no pain on R (Progressing)       Start:  02/06/25    Expected End:  03/20/25            Pt will be able to ascend 1 flight of stairs reciprocally reporting less than 2/10 pain (Progressing)       Start:  02/06/25    Expected End:  03/20/25                Mukul Rousseau, PT, DPT

## 2025-02-27 ENCOUNTER — CLINICAL SUPPORT (OUTPATIENT)
Dept: REHABILITATION | Facility: HOSPITAL | Age: 40
End: 2025-02-27
Payer: COMMERCIAL

## 2025-02-27 DIAGNOSIS — G89.29 CHRONIC PAIN OF RIGHT KNEE: Primary | ICD-10-CM

## 2025-02-27 DIAGNOSIS — M25.561 CHRONIC PAIN OF RIGHT KNEE: Primary | ICD-10-CM

## 2025-02-27 PROCEDURE — 97112 NEUROMUSCULAR REEDUCATION: CPT | Performed by: PHYSICAL THERAPIST

## 2025-02-27 PROCEDURE — 97530 THERAPEUTIC ACTIVITIES: CPT | Performed by: PHYSICAL THERAPIST

## 2025-02-27 NOTE — PROGRESS NOTES
"  Outpatient Rehab    Physical Therapy Visit    Patient Name: Guilherme Brown  MRN: [id number]  YOB: 1985  Today's Date: 2/27/2025    Therapy Diagnosis:   Encounter Diagnosis   Name Primary?    Chronic pain of right knee Yes     Physician: Torres Villegas, *    Physician Orders: Eval and Treat  Medical Diagnosis: Right knee pain, unspecified chronicity [M25.561], S/P ACL reconstruction [Z98.890]      Visit # / Visits Authorized:  3 / 10  Date of Evaluation:  2/6/2025   Insurance Authorization Period: 2/6/25-12/31/25  Plan of Care Certification:  2/6/2025 to 5/29/25                 Time In:     Time Out:    Total Time:     Total Billable Time: 55    FOTO:  Intake Score:  %  Survey Score 1:  %  Survey Score 2:  %         Subjective           No soreness after last visit. Continued pain with descending stairs/ squatting. Pt admits she hasn't been to the gym as much with [employer] events    Objective          SL squat 3/10 pain on R  SL squat with McConnel taping 1/10 pain on R    Treatment:  Guilherme received therapeutic exercises to develop strength, endurance, ROM, flexibility, posture, and core stabilization for 0 minutes including:      Guilherme received the following manual therapy techniques: Joint mobilizations were applied to the: R knee for 2 minutes, including:    McConnel taping for medial tilt/ medial glide    NP today  Patellar mobs all directions    Guilherme participated in neuromuscular re-education activities to improve: Balance, Coordination, Kinesthetic, Sense, Proprioception, and Posture for 42 minutes. The following activities were included:    SLR 3# 3x8 1" hold  SL hip abd 3# 3x15  SL hip ER 0# 3x20  Knee ext machine isometrics 30" hold x5  CKC hip ER w/ cable 5# 4x8    NP today    Side steps GTB 2 laps    Guilherme participated in dynamic functional therapeutic activities to improve functional performance for 11 minutes, including:    Upright bike for 5 min for LE endurance " "training  SL squat on slant board 4x8    NP today  SLDL w/ slide disc 4x8  DL squat on slant board x20  4" step down 4x8  Forward lunge w/ band cue for knee valgus 4x10      Assessment & Plan   Assessment:       Pt presents with no change in SL squat pain, continues to respond well to patellar taping. Pt challenged by SLR with 3#, benefits from cues to maintain TKE. Pt tolerates SL squat well, benefits from cues to dec trendelenburg. Will continue to progress strength as tolerated    Patient will continue to benefit from skilled outpatient physical therapy to address the deficits listed in the problem list box on initial evaluation, provide pt/family education and to maximize pt's level of independence in the home and community environment.     Patient's spiritual, cultural, and educational needs considered and patient agreeable to plan of care and goals.           Plan:  Continue progressing quad/ hip strength as tolerated    Goals:   Active       Long term goals       Pt will demonstrate 4 cm improvement in anterior Y balance reach score bilat reporting no pain (Progressing)       Start:  02/06/25    Expected End:  05/29/25            Pt will demonstrate no greater than 10% isometric strength discrepancy on R quad compared to L reporting no pain (Progressing)       Start:  02/06/25    Expected End:  05/29/25            Pt will be able to ascend/descend 2 flights of stairs reciprocally reporting no pain (Progressing)       Start:  02/06/25    Expected End:  05/29/25               Long term goals       Pt will be independent with return to run progression reporting no pain (Progressing)       Start:  02/06/25    Expected End:  05/29/25            Pt will be able to participate in group exercise class reporting no pain (Progressing)       Start:  02/06/25    Expected End:  05/29/25               Short term goals       Pt will demonstrate 3 kg improvement in quad isometric strength on R reporting no pain (Progressing) "       Start:  02/06/25    Expected End:  03/20/25            Pt will be able to perform 5 SL squats to 30 deg knee flexion reporting no pain on R (Progressing)       Start:  02/06/25    Expected End:  03/20/25            Pt will be able to ascend 1 flight of stairs reciprocally reporting less than 2/10 pain (Progressing)       Start:  02/06/25    Expected End:  03/20/25                Mukul Rousseau, PT, DPT

## 2025-03-06 ENCOUNTER — CLINICAL SUPPORT (OUTPATIENT)
Dept: REHABILITATION | Facility: HOSPITAL | Age: 40
End: 2025-03-06
Payer: COMMERCIAL

## 2025-03-06 DIAGNOSIS — M25.561 CHRONIC PAIN OF RIGHT KNEE: Primary | ICD-10-CM

## 2025-03-06 DIAGNOSIS — G89.29 CHRONIC PAIN OF RIGHT KNEE: Primary | ICD-10-CM

## 2025-03-06 PROCEDURE — 97112 NEUROMUSCULAR REEDUCATION: CPT | Performed by: PHYSICAL THERAPIST

## 2025-03-06 PROCEDURE — 97530 THERAPEUTIC ACTIVITIES: CPT | Performed by: PHYSICAL THERAPIST

## 2025-03-06 NOTE — PROGRESS NOTES
"  Outpatient Rehab    Physical Therapy Visit    Patient Name: Guilherme Brown  MRN: [id number]  YOB: 1985  Today's Date: 3/6/2025    Therapy Diagnosis:   Encounter Diagnosis   Name Primary?    Chronic pain of right knee Yes     Physician: Torres Villegas, *    Physician Orders: Eval and Treat  Medical Diagnosis: Right knee pain, unspecified chronicity [M25.561], S/P ACL reconstruction [Z98.890]      Visit # / Visits Authorized:  4 / 10  Date of Evaluation:  2/6/2025   Insurance Authorization Period: 2/6/25-12/31/25  Plan of Care Certification:  2/6/2025 to 5/29/25                 Time In:     Time Out:    Total Time:     Total Billable Time: 60    FOTO:  Intake Score:  %  Survey Score 1:  %  Survey Score 2:  %         Subjective           No soreness pain after last visit. Feels like going down stairs about the same but can go up stairs without pain. Hasn't been as consistent with HEP 2/2 melva gras but plans to get back to strengthening routine this week    Objective          SL squat 3/10 pain on R  SL squat with McConnel taping 1/10 pain on R    Treatment:  Guilherme received therapeutic exercises to develop strength, endurance, ROM, flexibility, posture, and core stabilization for 0 minutes including:      Guilherme received the following manual therapy techniques: Joint mobilizations were applied to the: R knee for 5 minutes, including:    McConnel taping for medial tilt/ medial glide    NP today  Patellar mobs all directions    Guilherme participated in neuromuscular re-education activities to improve: Balance, Coordination, Kinesthetic, Sense, Proprioception, and Posture for 25 minutes. The following activities were included:    SLR 2# 3x8 3" hold  SL hip abd 2# 4x10  SL hip ER 0# 3x20  Knee ext machine 15-20# 4x10    NP today  CKC hip ER w/ cable 5# 4x8  Side steps GTB 2 laps    Guilherme participated in dynamic functional therapeutic activities to improve functional performance for 30 minutes, " "including:    Upright bike for 5 min for LE endurance training  Pistol squat to 24" box w/ dowel assist 4x10  [place]izen of [place] split squat 4x10  Sneaky lunge w/ 10# weight 3x10 each    NP today  SL squat on slant board 4x8  SLDL w/ slide disc 4x8  DL squat on slant board x20  4" step down 4x8  Forward lunge w/ band cue for knee valgus 4x10      Assessment & Plan   Assessment:       Pt presents improved SL squat depth but no change in pain, responds well to taping. Progressed CKC strengthening today w/ good tolerance. Pt educated on progressing strengthening routine for HEP. Will continue to progress quad/ hip strength as tolerated    Patient will continue to benefit from skilled outpatient physical therapy to address the deficits listed in the problem list box on initial evaluation, provide pt/family education and to maximize pt's level of independence in the home and community environment.     Patient's spiritual, cultural, and educational needs considered and patient agreeable to plan of care and goals.           Plan:  Continue progressing quad/ hip strength as tolerated    Goals:   Active       Long term goals       Pt will demonstrate 4 cm improvement in anterior Y balance reach score bilat reporting no pain (Progressing)       Start:  02/06/25    Expected End:  05/29/25            Pt will demonstrate no greater than 10% isometric strength discrepancy on R quad compared to L reporting no pain (Progressing)       Start:  02/06/25    Expected End:  05/29/25            Pt will be able to ascend/descend 2 flights of stairs reciprocally reporting no pain (Progressing)       Start:  02/06/25    Expected End:  05/29/25               Long term goals       Pt will be independent with return to run progression reporting no pain (Progressing)       Start:  02/06/25    Expected End:  05/29/25            Pt will be able to participate in group exercise class reporting no pain (Progressing)       Start:  02/06/25    Expected End:  " 05/29/25               Short term goals       Pt will demonstrate 3 kg improvement in quad isometric strength on R reporting no pain (Progressing)       Start:  02/06/25    Expected End:  03/20/25            Pt will be able to perform 5 SL squats to 30 deg knee flexion reporting no pain on R (Progressing)       Start:  02/06/25    Expected End:  03/20/25            Pt will be able to ascend 1 flight of stairs reciprocally reporting less than 2/10 pain (Progressing)       Start:  02/06/25    Expected End:  03/20/25                Mukul Rousseau, PT, DPT

## 2025-03-12 ENCOUNTER — PATIENT MESSAGE (OUTPATIENT)
Dept: OBSTETRICS AND GYNECOLOGY | Facility: CLINIC | Age: 40
End: 2025-03-12
Payer: COMMERCIAL

## 2025-03-13 ENCOUNTER — CLINICAL SUPPORT (OUTPATIENT)
Dept: REHABILITATION | Facility: HOSPITAL | Age: 40
End: 2025-03-13
Payer: COMMERCIAL

## 2025-03-13 DIAGNOSIS — M25.561 CHRONIC PAIN OF RIGHT KNEE: Primary | ICD-10-CM

## 2025-03-13 DIAGNOSIS — G89.29 CHRONIC PAIN OF RIGHT KNEE: Primary | ICD-10-CM

## 2025-03-13 PROCEDURE — 97530 THERAPEUTIC ACTIVITIES: CPT | Performed by: PHYSICAL THERAPIST

## 2025-03-13 PROCEDURE — 97112 NEUROMUSCULAR REEDUCATION: CPT | Performed by: PHYSICAL THERAPIST

## 2025-03-13 NOTE — PROGRESS NOTES
"  Outpatient Rehab    Physical Therapy Visit    Patient Name: Guilherme Brown  MRN: [id number]  YOB: 1985  Today's Date: 3/13/2025    Therapy Diagnosis:   Encounter Diagnosis   Name Primary?    Chronic pain of right knee Yes     Physician: Torres Villegas, *    Physician Orders: Eval and Treat  Medical Diagnosis: Right knee pain, unspecified chronicity [M25.561], S/P ACL reconstruction [Z98.890]      Visit # / Visits Authorized:  5 / 10  Date of Evaluation:  2/6/2025   Insurance Authorization Period: 2/6/25-12/31/25  Plan of Care Certification:  2/6/2025 to 5/29/25                 Time In:     Time Out:    Total Time:     Total Billable Time: 52    FOTO:  Intake Score:  %  Survey Score 1:  %  Survey Score 2:  %         Subjective           No pain after last visit. Pt admits she hasn't been able to do as much HEP 2/2 time. Pt endorses pain mostly on initial bending of quad     Objective          SL squat 3/10 pain on R  SL squat with McConnel taping 1/10 pain on R    Treatment:  Guilherme received therapeutic exercises to develop strength, endurance, ROM, flexibility, posture, and core stabilization for 0 minutes including:      Guilherme received the following manual therapy techniques: Joint mobilizations were applied to the: R knee for 5 minutes, including:    McConnel taping for medial tilt/ medial glide    NP today  Patellar mobs all directions    Guilherme participated in neuromuscular re-education activities to improve: Balance, Coordination, Kinesthetic, Sense, Proprioception, and Posture for 25 minutes. The following activities were included:    SLR 2# 3x8 3" hold  SL hip abd 2# 4x10  SL hip ER 2# 3x20  Knee ext machine 17.5-25# 4x10    NP today  CKC hip ER w/ cable 5# 4x8  Side steps GTB 2 laps    Guilherme participated in dynamic functional therapeutic activities to improve functional performance for 30 minutes, including:    Upright bike for 5 min for LE endurance training  Pistol squat to 24" " "box w/ dowel assist 4x10  [place] split squat 25# 4x10  3" step down 3x10  SL squat w/ silver band TKE 2x20    NP today  Sneaky lunge w/ 10# weight 3x10 each  SL squat on slant board 4x8  SLDL w/ slide disc 4x8  DL squat on slant board x20  4" step down 4x8  Forward lunge w/ band cue for knee valgus 4x10      Assessment & Plan   Assessment:       Pt presents with pain reported at 20 deg of knee flexion on SL squat. Pt tolerates CKC strengthening once patella glides into trochlear groove. Pt instructed on pre workout activation drills to improve tolerance to progressed strengthening. Will continue to progress strength/ ROM as tolerated    Patient will continue to benefit from skilled outpatient physical therapy to address the deficits listed in the problem list box on initial evaluation, provide pt/family education and to maximize pt's level of independence in the home and community environment.     Patient's spiritual, cultural, and educational needs considered and patient agreeable to plan of care and goals.           Plan:  Continue progressing quad/ hip strength as tolerated    Goals:   Active       Long term goals       Pt will demonstrate 4 cm improvement in anterior Y balance reach score bilat reporting no pain (Progressing)       Start:  02/06/25    Expected End:  05/29/25            Pt will demonstrate no greater than 10% isometric strength discrepancy on R quad compared to L reporting no pain (Progressing)       Start:  02/06/25    Expected End:  05/29/25            Pt will be able to ascend/descend 2 flights of stairs reciprocally reporting no pain (Progressing)       Start:  02/06/25    Expected End:  05/29/25               Long term goals       Pt will be independent with return to run progression reporting no pain (Progressing)       Start:  02/06/25    Expected End:  05/29/25            Pt will be able to participate in group exercise class reporting no pain (Progressing)       Start:  02/06/25    " Expected End:  05/29/25               Short term goals       Pt will demonstrate 3 kg improvement in quad isometric strength on R reporting no pain (Progressing)       Start:  02/06/25    Expected End:  03/20/25            Pt will be able to perform 5 SL squats to 30 deg knee flexion reporting no pain on R (Progressing)       Start:  02/06/25    Expected End:  03/20/25            Pt will be able to ascend 1 flight of stairs reciprocally reporting less than 2/10 pain (Progressing)       Start:  02/06/25    Expected End:  03/20/25                Mukul Rousseau, PT, DPT

## 2025-03-19 ENCOUNTER — CLINICAL SUPPORT (OUTPATIENT)
Dept: REHABILITATION | Facility: HOSPITAL | Age: 40
End: 2025-03-19
Payer: COMMERCIAL

## 2025-03-19 DIAGNOSIS — G89.29 CHRONIC PAIN OF RIGHT KNEE: Primary | ICD-10-CM

## 2025-03-19 DIAGNOSIS — M25.561 CHRONIC PAIN OF RIGHT KNEE: Primary | ICD-10-CM

## 2025-03-19 PROCEDURE — 97112 NEUROMUSCULAR REEDUCATION: CPT | Performed by: PHYSICAL THERAPIST

## 2025-03-19 PROCEDURE — 97530 THERAPEUTIC ACTIVITIES: CPT | Performed by: PHYSICAL THERAPIST

## 2025-03-25 NOTE — PROGRESS NOTES
"  Outpatient Rehab    Physical Therapy Visit    Patient Name: Guilherme Brown  MRN: [id number]  YOB: 1985  Today's Date: 3/25/2025    Therapy Diagnosis:   Encounter Diagnosis   Name Primary?    Chronic pain of right knee Yes       Physician: Torres Villegas, *    Physician Orders: Eval and Treat  Medical Diagnosis: Right knee pain, unspecified chronicity [M25.561], S/P ACL reconstruction [Z98.890]      Visit # / Visits Authorized:  6 / 10  Date of Evaluation:  2/6/2025   Insurance Authorization Period: 2/6/25-12/31/25  Plan of Care Certification:  2/6/2025 to 5/29/25                 Time In:     Time Out:    Total Time:     Total Billable Time: 52    FOTO:  Intake Score:  %  Survey Score 1:  %  Survey Score 2:  %         Subjective           Pt reports improved compliance with HEP, reports reduction in sx with squatting movements but still having pain descending stairs.    Objective          SL squat 2/10 pain on R  SL squat with McConnel taping 0.5/10 pain on R    Treatment:  Guilherme received therapeutic exercises to develop strength, endurance, ROM, flexibility, posture, and core stabilization for 0 minutes including:      Guilherme received the following manual therapy techniques: Joint mobilizations were applied to the: R knee for 5 minutes, including:    McConnel taping for medial tilt/ medial glide    NP today  Patellar mobs all directions    Guilherme participated in neuromuscular re-education activities to improve: Balance, Coordination, Kinesthetic, Sense, Proprioception, and Posture for 25 minutes. The following activities were included:    SLR 2# 3x10 3" hold  SL hip abd 2# 4x10  SL bridge w/ GTB at knees 3x10  Knee ext machine 17.5-25# 4x10    NP today  SL hip ER 2# 3x20  CKC hip ER w/ cable 5# 4x8  Side steps GTB 2 laps    Guilherme participated in dynamic functional therapeutic activities to improve functional performance for 30 minutes, including:    Upright bike for 5 min for LE endurance " "training  Pistol squat to 24" box w/ dowel assist 4x10  [place] split squat 25# 4x10  3" step down 3x10  Shuttle press DL plyo 2 cord 3x20  Shuttle press SL alt jumps 1 cord 3x20    NP today  SL squat w/ silver band TKE 2x20  Sneaky lunge w/ 10# weight 3x10 each  SL squat on slant board 4x8  SLDL w/ slide disc 4x8  DL squat on slant board x20  4" step down 4x8  Forward lunge w/ band cue for knee valgus 4x10      Assessment & Plan   Assessment:       Pt presents with improved tolerance to SL squat. Continues to respond well to patellar taping for medial tilt/glide. Introduced body weight plyos on shuttle press w/ good tolerance. Pt instructed to inc quad loading in efforts to improve strength for running related activities. Will assess SL squat on leg press to determine SL strength deficit for running with goal being squatting body weight in SL.    Patient will continue to benefit from skilled outpatient physical therapy to address the deficits listed in the problem list box on initial evaluation, provide pt/family education and to maximize pt's level of independence in the home and community environment.     Patient's spiritual, cultural, and educational needs considered and patient agreeable to plan of care and goals.           Plan:  Continue progressing quad/ hip strength as tolerated    Goals:   Active       Long term goals       Pt will demonstrate 4 cm improvement in anterior Y balance reach score bilat reporting no pain (Progressing)       Start:  02/06/25    Expected End:  05/29/25            Pt will demonstrate no greater than 10% isometric strength discrepancy on R quad compared to L reporting no pain (Progressing)       Start:  02/06/25    Expected End:  05/29/25            Pt will be able to ascend/descend 2 flights of stairs reciprocally reporting no pain (Progressing)       Start:  02/06/25    Expected End:  05/29/25               Long term goals       Pt will be independent with return to run " progression reporting no pain (Progressing)       Start:  02/06/25    Expected End:  05/29/25            Pt will be able to participate in group exercise class reporting no pain (Progressing)       Start:  02/06/25    Expected End:  05/29/25               Short term goals       Pt will demonstrate 3 kg improvement in quad isometric strength on R reporting no pain (Progressing)       Start:  02/06/25    Expected End:  03/20/25            Pt will be able to perform 5 SL squats to 30 deg knee flexion reporting no pain on R (Progressing)       Start:  02/06/25    Expected End:  03/20/25            Pt will be able to ascend 1 flight of stairs reciprocally reporting less than 2/10 pain (Progressing)       Start:  02/06/25    Expected End:  03/20/25                Mukul Rousseau, PT, DPT

## 2025-03-26 ENCOUNTER — PATIENT MESSAGE (OUTPATIENT)
Dept: REHABILITATION | Facility: HOSPITAL | Age: 40
End: 2025-03-26
Payer: COMMERCIAL

## 2025-04-03 ENCOUNTER — CLINICAL SUPPORT (OUTPATIENT)
Dept: REHABILITATION | Facility: HOSPITAL | Age: 40
End: 2025-04-03
Payer: COMMERCIAL

## 2025-04-03 DIAGNOSIS — G89.29 CHRONIC PAIN OF RIGHT KNEE: Primary | ICD-10-CM

## 2025-04-03 DIAGNOSIS — M25.561 CHRONIC PAIN OF RIGHT KNEE: Primary | ICD-10-CM

## 2025-04-03 PROCEDURE — 97112 NEUROMUSCULAR REEDUCATION: CPT | Performed by: PHYSICAL THERAPIST

## 2025-04-03 PROCEDURE — 97530 THERAPEUTIC ACTIVITIES: CPT | Performed by: PHYSICAL THERAPIST

## 2025-04-03 NOTE — PROGRESS NOTES
"  Outpatient Rehab    Physical Therapy Visit    Patient Name: Guilherme Brown  MRN: [id number]  YOB: 1985  Today's Date: 4/3/2025    Therapy Diagnosis:   Encounter Diagnosis   Name Primary?    Chronic pain of right knee Yes     Physician: Torres Villegas, *    Physician Orders: Eval and Treat  Medical Diagnosis: Right knee pain, unspecified chronicity [M25.561], S/P ACL reconstruction [Z98.890]      Visit # / Visits Authorized:  7/ 22  Date of Evaluation:  2/6/2025   Insurance Authorization Period: 2/6/25-12/31/25  Plan of Care Certification:  2/6/2025 to 5/29/25                 Time In:     Time Out:    Total Time:     Total Billable Time: 55    FOTO:  Intake Score:  %  Survey Score 1:  %  Survey Score 2:  %         Subjective           Pt reports she was sick last week so didn't do much HEP. Pt reports knee feeling somewhat better. Stairs still hurt descending    Objective          SL squat 3/10 pain on R  SL squat with McConnel taping 0.5/10 pain on R    Treatment:  Guilherme received therapeutic exercises to develop strength, endurance, ROM, flexibility, posture, and core stabilization for 0 minutes including:      Guilherme received the following manual therapy techniques: Joint mobilizations were applied to the: R knee for 5 minutes, including:    McConnel taping for medial tilt/ medial glide    NP today  Patellar mobs all directions    Guilherme participated in neuromuscular re-education activities to improve: Balance, Coordination, Kinesthetic, Sense, Proprioception, and Posture for 25 minutes. The following activities were included:    SLR 2# 3x10 3" hold  SL hip abd 2# 3x15  Side steps GTB 2 laps  Knee ext machine 25# 4x10    NP today  SL hip ER 2# 3x20  CKC hip ER w/ cable 5# 4x8      Guilherme participated in dynamic functional therapeutic activities to improve functional performance for 30 minutes, including:    Leg press SL squat 120# x6, 140# x6, 160# x6, 180# x3, 200# x3 bilat  Snap downs DL " "2x10  Snap downs SL 3x5 each  2-to-1 hops 4x5 each  Pt education on return to run progression     NP today  Upright bike for 5 min for LE endurance training  Pistol squat to 24" box w/ dowel assist 4x10  [place] split squat 25# 4x10  3" step down 3x10  Shuttle press DL plyo 2 cord 3x20  Shuttle press SL alt jumps 1 cord 3x20  SL squat w/ silver band TKE 2x20  Sneaky lunge w/ 10# weight 3x10 each  SL squat on slant board 4x8  SLDL w/ slide disc 4x8  DL squat on slant board x20  4" step down 4x8  Forward lunge w/ band cue for knee valgus 4x10      Assessment & Plan   Assessment:       Pt presents w/ fairly good carryover of strength. Pt demonstrates sufficient strength on leg press to initiate return to run progression. Pt tolerates progressed plyometrics well today. Will continue to progress run specific strengthening/ plyometrics as tolerated     Patient will continue to benefit from skilled outpatient physical therapy to address the deficits listed in the problem list box on initial evaluation, provide pt/family education and to maximize pt's level of independence in the home and community environment.     Patient's spiritual, cultural, and educational needs considered and patient agreeable to plan of care and goals.           Plan:  Continue progressing quad/ hip strength as tolerated    Goals:   Active       Long term goals       Pt will demonstrate 4 cm improvement in anterior Y balance reach score bilat reporting no pain (Progressing)       Start:  02/06/25    Expected End:  05/29/25            Pt will demonstrate no greater than 10% isometric strength discrepancy on R quad compared to L reporting no pain (Progressing)       Start:  02/06/25    Expected End:  05/29/25            Pt will be able to ascend/descend 2 flights of stairs reciprocally reporting no pain (Progressing)       Start:  02/06/25    Expected End:  05/29/25               Long term goals       Pt will be independent with return to run " progression reporting no pain (Progressing)       Start:  02/06/25    Expected End:  05/29/25            Pt will be able to participate in group exercise class reporting no pain (Progressing)       Start:  02/06/25    Expected End:  05/29/25               Short term goals       Pt will demonstrate 3 kg improvement in quad isometric strength on R reporting no pain (Progressing)       Start:  02/06/25    Expected End:  03/20/25            Pt will be able to perform 5 SL squats to 30 deg knee flexion reporting no pain on R (Progressing)       Start:  02/06/25    Expected End:  03/20/25            Pt will be able to ascend 1 flight of stairs reciprocally reporting less than 2/10 pain (Progressing)       Start:  02/06/25    Expected End:  03/20/25                Mukul Rousseau, PT, DPT

## 2025-04-09 ENCOUNTER — PATIENT MESSAGE (OUTPATIENT)
Dept: REHABILITATION | Facility: HOSPITAL | Age: 40
End: 2025-04-09
Payer: COMMERCIAL

## 2025-04-21 ENCOUNTER — RESULTS FOLLOW-UP (OUTPATIENT)
Dept: OBSTETRICS AND GYNECOLOGY | Facility: HOSPITAL | Age: 40
End: 2025-04-21

## 2025-04-21 ENCOUNTER — PATIENT MESSAGE (OUTPATIENT)
Dept: OBSTETRICS AND GYNECOLOGY | Facility: CLINIC | Age: 40
End: 2025-04-21
Payer: COMMERCIAL

## 2025-04-21 ENCOUNTER — TELEPHONE (OUTPATIENT)
Dept: OBSTETRICS AND GYNECOLOGY | Facility: HOSPITAL | Age: 40
End: 2025-04-21
Payer: COMMERCIAL

## 2025-04-21 NOTE — TELEPHONE ENCOUNTER
Let her know matT21 is normal.  See if she wants to know the sex.   Cibinqo Pregnancy And Lactation Text: It is unknown if this medication will adversely affect pregnancy or breast feeding.  You should not take this medication if you are currently pregnant or planning a pregnancy or while breastfeeding.

## 2025-04-23 ENCOUNTER — CLINICAL SUPPORT (OUTPATIENT)
Dept: REHABILITATION | Facility: HOSPITAL | Age: 40
End: 2025-04-23
Payer: COMMERCIAL

## 2025-04-23 DIAGNOSIS — M25.561 CHRONIC PAIN OF RIGHT KNEE: Primary | ICD-10-CM

## 2025-04-23 DIAGNOSIS — G89.29 CHRONIC PAIN OF RIGHT KNEE: Primary | ICD-10-CM

## 2025-04-23 PROCEDURE — 97530 THERAPEUTIC ACTIVITIES: CPT | Performed by: PHYSICAL THERAPIST

## 2025-04-23 PROCEDURE — 97110 THERAPEUTIC EXERCISES: CPT | Performed by: PHYSICAL THERAPIST

## 2025-04-23 PROCEDURE — 97112 NEUROMUSCULAR REEDUCATION: CPT | Performed by: PHYSICAL THERAPIST

## 2025-04-23 NOTE — PROGRESS NOTES
"  Outpatient Rehab    Physical Therapy Visit    Patient Name: Guilherme Brown  MRN: [id number]  YOB: 1985  Today's Date: 4/23/2025    Therapy Diagnosis:   Encounter Diagnosis   Name Primary?    Chronic pain of right knee Yes     Physician: Torres Villegas, *    Physician Orders: Eval and Treat  Medical Diagnosis: Right knee pain, unspecified chronicity [M25.561], S/P ACL reconstruction [Z98.890]      Visit # / Visits Authorized:  8/ 22  Date of Evaluation:  2/6/2025   Insurance Authorization Period: 2/6/25-12/31/25  Plan of Care Certification:  2/6/2025 to 5/29/25                 Time In: 0905   Time Out: 1000  Total Time: 55   Total Billable Time: 55    FOTO:  Intake Score:  %  Survey Score 1:  %  Survey Score 2:  %         Subjective           Pt reports knee has been feeling okay, daughter was sick so she missed last weeks visit. Pt interested in starting jumping rope.    Objective          2/6/25  Quad isometric strength assessed at 80 deg knee flexion:  L = 40.9 kg;   R = 31 kg     4/23/25  Quad isometric strength assessed at 60 deg knee flexion:  L = 43.1 kg;   R = 23.4 kg   Hamstring isometric strength at 60 deg knee flexion  L = 27.5 kg  R = 24.3 kg    6" step down test: 1/10 pain reported    Treatment:  Guilherme received therapeutic exercises to develop strength, endurance, ROM, flexibility, posture, and core stabilization for 10 minutes including:    Tests and measures    Guilherme received the following manual therapy techniques: Joint mobilizations were applied to the: R knee for 0 minutes, including:    McConnel taping for medial tilt/ medial glide    NP today  Patellar mobs all directions    Guilherme participated in neuromuscular re-education activities to improve: Balance, Coordination, Kinesthetic, Sense, Proprioception, and Posture for 15 minutes. The following activities were included:    SLR 2# 3x10 3" hold  SL hip abd 2# 3x15  SLR x20 0# 3" hold    NP today  Side steps GTB 2 " "laps  Knee ext machine 25# 4x10  SL hip ER 2# 3x20  CKC hip ER w/ cable 5# 4x8      Guilherme participated in dynamic functional therapeutic activities to improve functional performance for 35 minutes, including:    Slant board squat 4x10  6" step up and over 4x8  Jump rope 3x30 reps  Pt education on return to run progression, progressing SL gym routine    NP today  Leg press SL squat 120# x6, 140# x6, 160# x6, 180# x3, 200# x3 bilat  Snap downs DL 2x10  Snap downs SL 3x5 each  2-to-1 hops 4x5 each  Upright bike for 5 min for LE endurance training  Pistol squat to 24" box w/ dowel assist 4x10  [place] split squat 25# 4x10  3" step down 3x10  Shuttle press DL plyo 2 cord 3x20  Shuttle press SL alt jumps 1 cord 3x20  SL squat w/ silver band TKE 2x20  Sneaky lunge w/ 10# weight 3x10 each  SL squat on slant board 4x8  SLDL w/ slide disc 4x8  DL squat on slant board x20    Forward lunge w/ band cue for knee valgus 4x10      Assessment & Plan   Assessment:       Pt presents w/ minimal strength gains made today. Potentially 2/2 dec flexion angle measurement today. Pt tolerates progressed CKC strengthening and jumping rope well today without addition of McConnel taping. Will continue to progress strength as tolerated    Patient will continue to benefit from skilled outpatient physical therapy to address the deficits listed in the problem list box on initial evaluation, provide pt/family education and to maximize pt's level of independence in the home and community environment.     Patient's spiritual, cultural, and educational needs considered and patient agreeable to plan of care and goals.           Plan:  Continue progressing quad/ hip strength as tolerated    Goals:   Active       Long term goals       Pt will demonstrate 4 cm improvement in anterior Y balance reach score bilat reporting no pain (Progressing)       Start:  02/06/25    Expected End:  05/29/25            Pt will demonstrate no greater than 10% isometric " strength discrepancy on R quad compared to L reporting no pain (Progressing)       Start:  02/06/25    Expected End:  05/29/25            Pt will be able to ascend/descend 2 flights of stairs reciprocally reporting no pain (Progressing)       Start:  02/06/25    Expected End:  05/29/25               Long term goals       Pt will be independent with return to run progression reporting no pain (Progressing)       Start:  02/06/25    Expected End:  05/29/25            Pt will be able to participate in group exercise class reporting no pain (Progressing)       Start:  02/06/25    Expected End:  05/29/25               Short term goals       Pt will demonstrate 3 kg improvement in quad isometric strength on R reporting no pain (Progressing)       Start:  02/06/25    Expected End:  03/20/25            Pt will be able to perform 5 SL squats to 30 deg knee flexion reporting no pain on R (Progressing)       Start:  02/06/25    Expected End:  03/20/25            Pt will be able to ascend 1 flight of stairs reciprocally reporting less than 2/10 pain (Progressing)       Start:  02/06/25    Expected End:  03/20/25                Mukul Rousseau, PT, DPT

## 2025-04-30 ENCOUNTER — CLINICAL SUPPORT (OUTPATIENT)
Dept: REHABILITATION | Facility: HOSPITAL | Age: 40
End: 2025-04-30
Payer: COMMERCIAL

## 2025-04-30 DIAGNOSIS — M25.561 CHRONIC PAIN OF RIGHT KNEE: Primary | ICD-10-CM

## 2025-04-30 DIAGNOSIS — G89.29 CHRONIC PAIN OF RIGHT KNEE: Primary | ICD-10-CM

## 2025-04-30 PROCEDURE — 97530 THERAPEUTIC ACTIVITIES: CPT | Performed by: PHYSICAL THERAPIST

## 2025-04-30 PROCEDURE — 97112 NEUROMUSCULAR REEDUCATION: CPT | Performed by: PHYSICAL THERAPIST

## 2025-04-30 NOTE — PROGRESS NOTES
"  Outpatient Rehab    Physical Therapy Visit    Patient Name: Guilherme Brown  MRN: [id number]  YOB: 1985  Today's Date: 4/30/2025    Therapy Diagnosis:   Encounter Diagnosis   Name Primary?    Chronic pain of right knee Yes     Physician: Torres Villegas, *    Physician Orders: Eval and Treat  Medical Diagnosis: Right knee pain, unspecified chronicity [M25.561], S/P ACL reconstruction [Z98.890]      Visit # / Visits Authorized:  9/ 22  Date of Evaluation:  2/6/2025   Insurance Authorization Period: 2/6/25-12/31/25  Plan of Care Certification:  2/6/2025 to 5/29/25                 Time In:     Time Out:    Total Time:     Total Billable Time: 55    FOTO:  Intake Score:  %  Survey Score 1:  %  Survey Score 2:  %         Subjective           Pt reports knee has been feeling okay, daughter was sick so she missed last weeks visit. Pt interested in starting jumping rope.    Objective          2/6/25  Quad isometric strength assessed at 80 deg knee flexion:  L = 40.9 kg;   R = 31 kg     4/23/25  Quad isometric strength assessed at 60 deg knee flexion:  L = 43.1 kg;   R = 23.4 kg   Hamstring isometric strength at 60 deg knee flexion  L = 27.5 kg  R = 24.3 kg    8" step down test: 1/10 pain reported    Treatment:  Guilherme received therapeutic exercises to develop strength, endurance, ROM, flexibility, posture, and core stabilization for 00 minutes including:    Tests and measures    Guilherme received the following manual therapy techniques: Joint mobilizations were applied to the: R knee for 0 minutes, including:    McConnel taping for medial tilt/ medial glide    NP today  Patellar mobs all directions    Guilherme participated in neuromuscular re-education activities to improve: Balance, Coordination, Kinesthetic, Sense, Proprioception, and Posture for 35 minutes. The following activities were included:    SLR 3# 3x8 3" hold  Knee ext machine hammer 5# 4x15  Side steps GTB 2 laps  Standing clam GTB 3x20    NP " "today  SL hip abd 2# 3x15  SLR x20 0# 3" hold  SL hip ER 2# 3x20  CKC hip ER w/ cable 5# 4x8    Guilherme participated in dynamic functional therapeutic activities to improve functional performance for 25 minutes, including:    Slant board squat 4x10  8" step up and over x20  Pistol squat to 24" box w/ dowel assist 4x10  Pt education on return to run progression, progressing SL gym routine    NP today  6" step up and over 4x8  Leg press SL squat 120# x6, 140# x6, 160# x6, 180# x3, 200# x3 bilat  Snap downs DL 2x10  Snap downs SL 3x5 each  2-to-1 hops 4x5 each  Jump rope 3x30 reps  Upright bike for 5 min for LE endurance training  Uzbek split squat 25# 4x10  3" step down 3x10  Shuttle press DL plyo 2 cord 3x20  Shuttle press SL alt jumps 1 cord 3x20  SL squat w/ silver band TKE 2x20  Sneaky lunge w/ 10# weight 3x10 each  SL squat on slant board 4x8  SLDL w/ slide disc 4x8  DL squat on slant board x20  Forward lunge w/ band cue for knee valgus 4x10      Assessment & Plan   Assessment:       Pt presents w/ low irritability today. Pt tolerates progressed CKC loading well. Pt educated on progressing isolated quad strength at the gym. Will see pt in 3 weeks to re-assess strength/ tolerance to independent HEP    Patient will continue to benefit from skilled outpatient physical therapy to address the deficits listed in the problem list box on initial evaluation, provide pt/family education and to maximize pt's level of independence in the home and community environment.     Patient's spiritual, cultural, and educational needs considered and patient agreeable to plan of care and goals.           Plan:  Continue progressing quad/ hip strength as tolerated    Goals:   Active       Long term goals       Pt will demonstrate 4 cm improvement in anterior Y balance reach score bilat reporting no pain (Progressing)       Start:  02/06/25    Expected End:  05/29/25            Pt will demonstrate no greater than 10% isometric strength " discrepancy on R quad compared to L reporting no pain (Progressing)       Start:  02/06/25    Expected End:  05/29/25            Pt will be able to ascend/descend 2 flights of stairs reciprocally reporting no pain (Progressing)       Start:  02/06/25    Expected End:  05/29/25               Long term goals       Pt will be independent with return to run progression reporting no pain (Progressing)       Start:  02/06/25    Expected End:  05/29/25            Pt will be able to participate in group exercise class reporting no pain (Progressing)       Start:  02/06/25    Expected End:  05/29/25               Short term goals       Pt will demonstrate 3 kg improvement in quad isometric strength on R reporting no pain (Progressing)       Start:  02/06/25    Expected End:  03/20/25            Pt will be able to perform 5 SL squats to 30 deg knee flexion reporting no pain on R (Progressing)       Start:  02/06/25    Expected End:  03/20/25            Pt will be able to ascend 1 flight of stairs reciprocally reporting less than 2/10 pain (Progressing)       Start:  02/06/25    Expected End:  03/20/25                Muukl Rousseau, PT, DPT

## 2025-05-01 ENCOUNTER — INITIAL PRENATAL (OUTPATIENT)
Dept: OBSTETRICS AND GYNECOLOGY | Facility: CLINIC | Age: 40
End: 2025-05-01
Attending: OBSTETRICS & GYNECOLOGY
Payer: COMMERCIAL

## 2025-05-01 VITALS
BODY MASS INDEX: 33.77 KG/M2 | SYSTOLIC BLOOD PRESSURE: 112 MMHG | WEIGHT: 215.63 LBS | DIASTOLIC BLOOD PRESSURE: 72 MMHG

## 2025-05-01 DIAGNOSIS — B00.9 HERPES SIMPLEX TYPE 2 (HSV-2) INFECTION AFFECTING PREGNANCY, ANTEPARTUM: ICD-10-CM

## 2025-05-01 DIAGNOSIS — O09.819 SUPERVISION OF PREGNANCY RESULTING FROM ASSISTED REPRODUCTIVE TECHNOLOGY: Primary | ICD-10-CM

## 2025-05-01 DIAGNOSIS — O09.529 ANTEPARTUM MULTIGRAVIDA OF ADVANCED MATERNAL AGE: ICD-10-CM

## 2025-05-01 DIAGNOSIS — O99.210 OBESITY AFFECTING PREGNANCY, ANTEPARTUM, UNSPECIFIED OBESITY TYPE: ICD-10-CM

## 2025-05-01 DIAGNOSIS — O98.519 HERPES SIMPLEX TYPE 2 (HSV-2) INFECTION AFFECTING PREGNANCY, ANTEPARTUM: ICD-10-CM

## 2025-05-01 PROBLEM — M25.561 RIGHT KNEE PAIN: Status: RESOLVED | Noted: 2019-08-28 | Resolved: 2025-05-01

## 2025-05-01 PROBLEM — M25.661 DECREASED RANGE OF MOTION (ROM) OF RIGHT KNEE: Status: RESOLVED | Noted: 2019-11-14 | Resolved: 2025-05-01

## 2025-05-01 PROBLEM — M62.81 MUSCLE WEAKNESS OF LOWER EXTREMITY: Status: RESOLVED | Noted: 2019-11-14 | Resolved: 2025-05-01

## 2025-05-01 PROBLEM — Z98.890 S/P ACL REPAIR: Status: RESOLVED | Noted: 2022-06-23 | Resolved: 2025-05-01

## 2025-05-01 PROBLEM — J02.0 STREP THROAT: Status: RESOLVED | Noted: 2022-10-01 | Resolved: 2025-05-01

## 2025-05-01 PROCEDURE — 0502F SUBSEQUENT PRENATAL CARE: CPT | Mod: CPTII,S$GLB,, | Performed by: OBSTETRICS & GYNECOLOGY

## 2025-05-01 PROCEDURE — 99999 PR PBB SHADOW E&M-EST. PATIENT-LVL III: CPT | Mod: PBBFAC,,, | Performed by: OBSTETRICS & GYNECOLOGY

## 2025-05-01 RX ORDER — ASPIRIN 81 MG/1
81 TABLET ORAL DAILY
COMMUNITY

## 2025-05-01 RX ORDER — METOCLOPRAMIDE 10 MG/1
TABLET ORAL
COMMUNITY
Start: 2025-04-02

## 2025-05-01 RX ORDER — ESTRADIOL 2 MG/1
2 TABLET ORAL
COMMUNITY
Start: 2025-03-31

## 2025-05-01 RX ORDER — PROGESTERONE 50 MG/ML
INJECTION, SOLUTION INTRAMUSCULAR
COMMUNITY
Start: 2025-03-20

## 2025-05-01 NOTE — PROGRESS NOTES
Past medical, surgical, social, family, and obstetric histories; medications; prior records and results; and available outside records were reviewed and updated in the EMR.  Pertinent findings were noted below.    Reason for Visit   No chief complaint on file.    HPI   40 y.o. female     New patient: No    No LMP recorded. Patient is pregnant.    Nausea:  Yes  Vomiting: No  Cramping: No  Bleeding:  No    Family history of bleeding disorders, birth defects, or mental disability: DENIES  Complications with prior pregnancy: DENIES. Reports home birth with first pregnancy     Pap: 2022, NILM/HPV(-)  Mammogram: BIRADS1, T-C=15.44%  Allergies: Patient has no known allergies.  OB History    Para Term  AB Living   2 1 1 0 0 1   SAB IAB Ectopic Multiple Live Births   0 0 0 0       # Outcome Date GA Lbr Nathaniel/2nd Weight Sex Type Anes PTL Lv   2 Current            1 Term                Exam   /72   Wt 97.8 kg (215 lb 9.8 oz)   BMI 33.77 kg/m²     Physical Exam  Constitutional:       Appearance: Normal appearance.   Pulmonary:      Effort: Pulmonary effort is normal. No respiratory distress.   Abdominal:      General: There is no distension.      Palpations: Abdomen is soft.      Tenderness: There is no abdominal tenderness. There is no guarding or rebound.   Musculoskeletal:         General: Normal range of motion.   Neurological:      General: No focal deficit present.      Mental Status: She is alert and oriented to person, place, and time.   Skin:     General: Skin is warm and dry.   Psychiatric:         Mood and Affect: Mood normal.         Behavior: Behavior normal.       Assessment and Plan   Supervision of pregnancy resulting from assisted reproductive technology  -     Cancel: C. trachomatis/N. gonorrhoeae by AMP DNA  -     Type & Screen; Future; Expected date: 2025  -     CBC Auto Differential; Future; Expected date: 2025  -     Hepatitis B Surface Antigen; Future; Expected  date: 05/01/2025  -     HIV 1/2 Ag/Ab (4th Gen); Future; Expected date: 05/01/2025  -     Hepatitis C Antibody; Future; Expected date: 05/01/2025  -     Treponema Pallidium Antibodies IgG, IgM; Future; Expected date: 05/01/2025  -     C. trachomatis/N. gonorrhoeae by AMP DNA  -     US MFM Procedure (Viewpoint); Future; Expected date: 06/23/2025    Antepartum multigravida of advanced maternal age    Herpes simplex type 2 (HSV-2) infection affecting pregnancy, antepartum    Obesity affecting pregnancy, antepartum, unspecified obesity type      UPT positive  Dating  FET 2/21/25 - FRANCOISE 11/9/25  Initial prenatal labs ordered  Records reviewed from [facility] - Rubella and Varicella immune  Aneuploidy screening: MT 21 WNL  PNV  ASA 81 mg   Safe anti-emetics discussed in pregnancy. Patient has Rx for reglan and has Unisom at home   Anatomy scan ordered    NEW PREGNANCY COUNSELING  Patient was counseled today on:  - Routine prenatal blood tests including HIV and anticipated course of prenatal care  - Prenatal vitamins and folic acid  - Weight gain, nutrition, and exercise  - Seafood and mercury  - Properly heating deli and prepared meats and avoiding unrefrigerated deli  meats, cheeses, and milk products,   - Avoiding cat litter and raw meats due to risk of Toxoplasmosis precautions   - Accuracy of the LMP-based FRANCOISE and the value of an early TV-u/s  - Aneuploidy and neural tube screening -- cffDNA, sequential screening, and AFP screen at 15 weeks  - OTC medication in the first trimester  - Harmful effects of smoking, etOH, and recreational drugs  - MFM u/s  at 18-20 weeks.  - Common complaints of pregnancy  - Seat belt use  - Childbirth classes and hospital facilities  - All questions were answered    - Pain and bleeding precautions given    - Return to clinic in 4 weeks    Albertina Kitchen MD  PGY-4 OB/GYN

## 2025-05-05 ENCOUNTER — RESULTS FOLLOW-UP (OUTPATIENT)
Dept: OBSTETRICS AND GYNECOLOGY | Facility: CLINIC | Age: 40
End: 2025-05-05

## 2025-05-14 ENCOUNTER — LAB VISIT (OUTPATIENT)
Dept: LAB | Facility: HOSPITAL | Age: 40
End: 2025-05-14
Payer: COMMERCIAL

## 2025-05-14 ENCOUNTER — OFFICE VISIT (OUTPATIENT)
Dept: DERMATOLOGY | Facility: CLINIC | Age: 40
End: 2025-05-14
Payer: COMMERCIAL

## 2025-05-14 DIAGNOSIS — D23.9 DERMATOFIBROMA: ICD-10-CM

## 2025-05-14 DIAGNOSIS — O09.819 SUPERVISION OF PREGNANCY RESULTING FROM ASSISTED REPRODUCTIVE TECHNOLOGY: ICD-10-CM

## 2025-05-14 DIAGNOSIS — Z12.83 SKIN CANCER SCREENING: ICD-10-CM

## 2025-05-14 DIAGNOSIS — L81.4 LENTIGINES: ICD-10-CM

## 2025-05-14 DIAGNOSIS — L65.9 HAIR LOSS: ICD-10-CM

## 2025-05-14 DIAGNOSIS — D22.9 MULTIPLE BENIGN NEVI: Primary | ICD-10-CM

## 2025-05-14 DIAGNOSIS — L82.1 SK (SEBORRHEIC KERATOSIS): ICD-10-CM

## 2025-05-14 LAB
ABSOLUTE EOSINOPHIL (OHS): 0.06 K/UL
ABSOLUTE MONOCYTE (OHS): 0.5 K/UL (ref 0.3–1)
ABSOLUTE NEUTROPHIL COUNT (OHS): 7.14 K/UL (ref 1.8–7.7)
BASOPHILS # BLD AUTO: 0.05 K/UL
BASOPHILS NFR BLD AUTO: 0.5 %
ERYTHROCYTE [DISTWIDTH] IN BLOOD BY AUTOMATED COUNT: 12.6 % (ref 11.5–14.5)
HBV SURFACE AG SERPL QL IA: NORMAL
HCT VFR BLD AUTO: 36.4 % (ref 37–48.5)
HCV AB SERPL QL IA: NORMAL
HGB BLD-MCNC: 11.5 GM/DL (ref 12–16)
HIV 1+2 AB+HIV1 P24 AG SERPL QL IA: NORMAL
IMM GRANULOCYTES # BLD AUTO: 0.04 K/UL (ref 0–0.04)
IMM GRANULOCYTES NFR BLD AUTO: 0.4 % (ref 0–0.5)
LYMPHOCYTES # BLD AUTO: 2.03 K/UL (ref 1–4.8)
MCH RBC QN AUTO: 27.7 PG (ref 27–31)
MCHC RBC AUTO-ENTMCNC: 31.6 G/DL (ref 32–36)
MCV RBC AUTO: 88 FL (ref 82–98)
NUCLEATED RBC (/100WBC) (OHS): 0 /100 WBC
PLATELET # BLD AUTO: 286 K/UL (ref 150–450)
PMV BLD AUTO: 11.3 FL (ref 9.2–12.9)
RBC # BLD AUTO: 4.15 M/UL (ref 4–5.4)
RELATIVE EOSINOPHIL (OHS): 0.6 %
RELATIVE LYMPHOCYTE (OHS): 20.7 % (ref 18–48)
RELATIVE MONOCYTE (OHS): 5.1 % (ref 4–15)
RELATIVE NEUTROPHIL (OHS): 72.7 % (ref 38–73)
T PALLIDUM IGG+IGM SER QL: NORMAL
WBC # BLD AUTO: 9.82 K/UL (ref 3.9–12.7)

## 2025-05-14 PROCEDURE — 86803 HEPATITIS C AB TEST: CPT

## 2025-05-14 PROCEDURE — 99999 PR PBB SHADOW E&M-EST. PATIENT-LVL III: CPT | Mod: PBBFAC,,, | Performed by: DERMATOLOGY

## 2025-05-14 PROCEDURE — 87389 HIV-1 AG W/HIV-1&-2 AB AG IA: CPT

## 2025-05-14 PROCEDURE — 1160F RVW MEDS BY RX/DR IN RCRD: CPT | Mod: CPTII,S$GLB,, | Performed by: DERMATOLOGY

## 2025-05-14 PROCEDURE — 86850 RBC ANTIBODY SCREEN: CPT

## 2025-05-14 PROCEDURE — 86593 SYPHILIS TEST NON-TREP QUANT: CPT

## 2025-05-14 PROCEDURE — 87340 HEPATITIS B SURFACE AG IA: CPT

## 2025-05-14 PROCEDURE — 1159F MED LIST DOCD IN RCRD: CPT | Mod: CPTII,S$GLB,, | Performed by: DERMATOLOGY

## 2025-05-14 PROCEDURE — 36415 COLL VENOUS BLD VENIPUNCTURE: CPT | Mod: PN

## 2025-05-14 PROCEDURE — 99203 OFFICE O/P NEW LOW 30 MIN: CPT | Mod: S$GLB,,, | Performed by: DERMATOLOGY

## 2025-05-14 PROCEDURE — 85025 COMPLETE CBC W/AUTO DIFF WBC: CPT

## 2025-05-14 NOTE — PATIENT INSTRUCTIONS

## 2025-05-14 NOTE — PROGRESS NOTES
Subjective:      Patient ID:  Klarissa Brown is a 40 y.o. female who presents for   Chief Complaint   Patient presents with    Skin Check     Patient here for Total Body Skin Exam    Last seen by dermatologist: never     no - personal history of atypical moles removed  no - personal history of MM   no - family history of MM  no - childhood blistering sunburns  no - tanning bed use  no - personal history of NMSC    No new concerning moles or lesions    Pt is currently pregnant.    Has noticed thinning of hair recently around hairline.  States her hair never came in as thick after having her last child about 5 yrs ago.  No itching, flaking.    Review of Systems   Constitutional:  Negative for fever and chills.   Respiratory:  Negative for cough and shortness of breath.    Gastrointestinal:  Negative for nausea and vomiting.   Musculoskeletal:  Negative for joint swelling and arthralgias.   Skin:  Negative for daily sunscreen use, activity-related sunscreen use, recent sunburn and wears hat.   All other systems reviewed and are negative.  Hematologic/Lymphatic: Does not bruise/bleed easily.       Objective:   Physical Exam   Constitutional: She appears well-developed and well-nourished. No distress.   Neurological: She is alert and oriented to person, place, and time. She is not disoriented.   Psychiatric: She has a normal mood and affect.   Skin:   Areas Examined (abnormalities noted in diagram):   Scalp / Hair Palpated and Inspected  Head / Face Inspection Performed  Neck Inspection Performed  Chest / Axilla Inspection Performed  Abdomen Inspection Performed  Genitals / Buttocks / Groin Inspection Performed  Back Inspection Performed  RUE Inspected  LUE Inspection Performed  RLE Inspected  LLE Inspection Performed  Nails and Digits Inspection Performed                 Diagram Legend     Erythematous scaling macule/papule c/w actinic keratosis       Vascular papule c/w angioma      Pigmented verrucoid  papule/plaque c/w seborrheic keratosis      Yellow umbilicated papule c/w sebaceous hyperplasia      Irregularly shaped tan macule c/w lentigo     1-2 mm smooth white papules consistent with Milia      Movable subcutaneous cyst with punctum c/w epidermal inclusion cyst      Subcutaneous movable cyst c/w pilar cyst      Firm pink to brown papule c/w dermatofibroma      Pedunculated fleshy papule(s) c/w skin tag(s)      Evenly pigmented macule c/w junctional nevus     Mildly variegated pigmented, slightly irregular-bordered macule c/w mildly atypical nevus      Flesh colored to evenly pigmented papule c/w intradermal nevus       Pink pearly papule/plaque c/w basal cell carcinoma      Erythematous hyperkeratotic cursted plaque c/w SCC      Surgical scar with no sign of skin cancer recurrence      Open and closed comedones      Inflammatory papules and pustules      Verrucoid papule consistent consistent with wart     Erythematous eczematous patches and plaques     Dystrophic onycholytic nail with subungual debris c/w onychomycosis     Umbilicated papule    Erythematous-base heme-crusted tan verrucoid plaque consistent with inflamed seborrheic keratosis     Erythematous Silvery Scaling Plaque c/w Psoriasis     See annotation      Assessment / Plan:        Multiple benign nevi  Benign-appearing nevi present on exam today. Reassurance provided. Periodically examine moles and return to clinic if any moles change or become symptomatic (bleeding, itching, pain, etc).    Lentigines  These are benign sun spots which should be monitored for changes. Patient instructed in importance of daily broad spectrum sunscreen use with spf at least 30. Sun avoidance and topical protection/protective clothing discussed.    SK (seborrheic keratosis)  These are benign inherited growths without a malignant potential. Reassurance given to patient. No treatment is necessary.   Treatment of benign, asymptomatic lesions may be considered  cosmetic.  Warned about risk of hypo- or hyperpigmentation with treatment and risk of recurrence.    Dermatofibroma  Reassurance given to patient. No treatment is necessary.  This is benign scar tissue from previous minor trauma to the skin such as a bug bite.    Hair loss  Not inflammatory or scarring.  Rec: waiting until after pregnancy to treat    Skin cancer screening  Total body skin examination performed today including at least 12 points as noted in physical examination. No lesions suspicious for malignancy noted.  Patient instructed in importance of daily broad spectrum sunscreen use with spf at least 30. Sun avoidance and topical protection/protective clothing discussed.    Follow up in about 1 year (around 5/14/2026) for skin check or sooner for any concerns.

## 2025-05-15 LAB
INDIRECT COOMBS: NORMAL
RH BLD: NORMAL
SPECIMEN OUTDATE: NORMAL

## 2025-05-21 ENCOUNTER — CLINICAL SUPPORT (OUTPATIENT)
Dept: REHABILITATION | Facility: HOSPITAL | Age: 40
End: 2025-05-21
Payer: COMMERCIAL

## 2025-05-21 ENCOUNTER — PATIENT MESSAGE (OUTPATIENT)
Dept: OBSTETRICS AND GYNECOLOGY | Facility: CLINIC | Age: 40
End: 2025-05-21
Payer: COMMERCIAL

## 2025-05-21 DIAGNOSIS — G89.29 CHRONIC PAIN OF RIGHT KNEE: Primary | ICD-10-CM

## 2025-05-21 DIAGNOSIS — M25.561 CHRONIC PAIN OF RIGHT KNEE: Primary | ICD-10-CM

## 2025-05-21 PROCEDURE — 97112 NEUROMUSCULAR REEDUCATION: CPT | Performed by: PHYSICAL THERAPIST

## 2025-05-21 PROCEDURE — 97530 THERAPEUTIC ACTIVITIES: CPT | Performed by: PHYSICAL THERAPIST

## 2025-05-21 NOTE — PROGRESS NOTES
"  Outpatient Rehab    Physical Therapy Visit    Patient Name: Guilherme Brown  MRN: [id number]  YOB: 1985  Today's Date: 5/21/2025    Therapy Diagnosis:   Encounter Diagnosis   Name Primary?    Chronic pain of right knee Yes     Physician: Torres Villegas, *    Physician Orders: Eval and Treat  Medical Diagnosis: Right knee pain, unspecified chronicity [M25.561], S/P ACL reconstruction [Z98.890]      Visit # / Visits Authorized:  10/ 22  Date of Evaluation:  2/6/2025   Insurance Authorization Period: 2/6/25-12/31/25  Plan of Care Certification:  2/6/2025 to 5/29/25                 Time In:     Time Out:    Total Time:     Total Billable Time: 50    FOTO:  Intake Score:  %  Survey Score 1:  %  Survey Score 2:  %         Subjective           Pt reports knee has been feeling okay. Pt reports she got a gym membership. Has been performing HEP as discussed.     Objective          2/6/25  Quad isometric strength assessed at 80 deg knee flexion:  L = 40.9 kg;   R = 31 kg     4/23/25  Quad isometric strength assessed at 60 deg knee flexion:  L = 43.1 kg;   R = 23.4 kg   Hamstring isometric strength at 60 deg knee flexion  L = 27.5 kg  R = 24.3 kg    SL squat: 2/10 pre tx  SL squat: 0.75/10 after interventions    Treatment:  Guilherme received therapeutic exercises to develop strength, endurance, ROM, flexibility, posture, and core stabilization for 5 minutes including:    Tests and measures    Guilherme received the following manual therapy techniques: Joint mobilizations were applied to the: R knee for 0 minutes, including:    McConnel taping for medial tilt/ medial glide    NP today  Patellar mobs all directions    Guilherme participated in neuromuscular re-education activities to improve: Balance, Coordination, Kinesthetic, Sense, Proprioception, and Posture for 25 minutes. The following activities were included:    SLR 3# 3x8 3" hold  Knee ext machine matrix 15-23# 4x15  Standing clam GTB 3x20  SLDL w/ 15# KB " "pass 3x20    NP today  Side steps GTB 2 laps  SL hip abd 2# 3x15  SLR x20 0# 3" hold  SL hip ER 2# 3x20  CKC hip ER w/ cable 5# 4x8    Guilherme participated in dynamic functional therapeutic activities to improve functional performance for 20 minutes, including:    Jamaican split squat 0# 4x10  Pistol squat to 24" box w/ dowel assist 4x10  Pt education on return to run progression, progressing SL gym routine    NP today  Slant board squat 4x10  8" step up and over x20  6" step up and over 4x8  Leg press SL squat 120# x6, 140# x6, 160# x6, 180# x3, 200# x3 bilat  Snap downs DL 2x10  Snap downs SL 3x5 each  2-to-1 hops 4x5 each  Jump rope 3x30 reps  Upright bike for 5 min for LE endurance training  3" step down 3x10  Shuttle press DL plyo 2 cord 3x20  Shuttle press SL alt jumps 1 cord 3x20  SL squat w/ silver band TKE 2x20  Sneaky lunge w/ 10# weight 3x10 each  SL squat on slant board 4x8  SLDL w/ slide disc 4x8  DL squat on slant board x20  Forward lunge w/ band cue for knee valgus 4x10      Assessment & Plan   Assessment:       Pt presents w/ low irritability today. Pt tolerates progressed CKC loading well. Pt educated on progressing isolated quad strength at the gym. Will see pt in 3 weeks to re-assess strength/ tolerance to independent HEP    Patient will continue to benefit from skilled outpatient physical therapy to address the deficits listed in the problem list box on initial evaluation, provide pt/family education and to maximize pt's level of independence in the home and community environment.     Patient's spiritual, cultural, and educational needs considered and patient agreeable to plan of care and goals.           Plan:  Continue progressing quad/ hip strength as tolerated    Goals:   Active       Long term goals       Pt will demonstrate 4 cm improvement in anterior Y balance reach score bilat reporting no pain (Progressing)       Start:  02/06/25    Expected End:  05/29/25            Pt will demonstrate " no greater than 10% isometric strength discrepancy on R quad compared to L reporting no pain (Progressing)       Start:  02/06/25    Expected End:  05/29/25            Pt will be able to ascend/descend 2 flights of stairs reciprocally reporting no pain (Progressing)       Start:  02/06/25    Expected End:  05/29/25               Long term goals       Pt will be independent with return to run progression reporting no pain (Progressing)       Start:  02/06/25    Expected End:  05/29/25            Pt will be able to participate in group exercise class reporting no pain (Progressing)       Start:  02/06/25    Expected End:  05/29/25               Short term goals       Pt will demonstrate 3 kg improvement in quad isometric strength on R reporting no pain (Progressing)       Start:  02/06/25    Expected End:  03/20/25            Pt will be able to perform 5 SL squats to 30 deg knee flexion reporting no pain on R (Progressing)       Start:  02/06/25    Expected End:  03/20/25            Pt will be able to ascend 1 flight of stairs reciprocally reporting less than 2/10 pain (Progressing)       Start:  02/06/25    Expected End:  03/20/25                  Mukul Rousseau, PT, DPT

## 2025-05-27 ENCOUNTER — PATIENT MESSAGE (OUTPATIENT)
Dept: INTERNAL MEDICINE | Facility: CLINIC | Age: 40
End: 2025-05-27
Payer: COMMERCIAL

## 2025-05-27 ENCOUNTER — PATIENT MESSAGE (OUTPATIENT)
Dept: OBSTETRICS AND GYNECOLOGY | Facility: CLINIC | Age: 40
End: 2025-05-27
Payer: COMMERCIAL

## 2025-05-27 DIAGNOSIS — O09.90 SUPERVISION OF HIGH RISK PREGNANCY, ANTEPARTUM: Primary | ICD-10-CM

## 2025-05-27 RX ORDER — METFORMIN HYDROCHLORIDE 750 MG/1
1500 TABLET, EXTENDED RELEASE ORAL
Qty: 60 TABLET | Refills: 11 | Status: SHIPPED | OUTPATIENT
Start: 2025-05-27 | End: 2026-05-27

## 2025-05-28 ENCOUNTER — HOSPITAL ENCOUNTER (OUTPATIENT)
Dept: RADIOLOGY | Facility: OTHER | Age: 40
Discharge: HOME | End: 2025-05-28
Attending: INTERNAL MEDICINE
Payer: COMMERCIAL

## 2025-05-28 DIAGNOSIS — Z12.31 ENCOUNTER FOR SCREENING MAMMOGRAM FOR BREAST CANCER: ICD-10-CM

## 2025-05-28 PROCEDURE — 77063 BREAST TOMOSYNTHESIS BI: CPT | Mod: TC

## 2025-06-01 ENCOUNTER — PATIENT MESSAGE (OUTPATIENT)
Dept: OTHER | Facility: OTHER | Age: 40
End: 2025-06-01
Payer: COMMERCIAL

## 2025-06-02 ENCOUNTER — PATIENT MESSAGE (OUTPATIENT)
Dept: INTERNAL MEDICINE | Facility: CLINIC | Age: 40
End: 2025-06-02
Payer: COMMERCIAL

## 2025-06-02 ENCOUNTER — PATIENT MESSAGE (OUTPATIENT)
Dept: OBSTETRICS AND GYNECOLOGY | Facility: CLINIC | Age: 40
End: 2025-06-02
Payer: COMMERCIAL

## 2025-06-03 RX ORDER — AZITHROMYCIN 500 MG/1
1000 TABLET, FILM COATED ORAL DAILY
Qty: 2 TABLET | Refills: 0 | Status: SHIPPED | OUTPATIENT
Start: 2025-06-03 | End: 2025-06-04

## 2025-06-03 NOTE — TELEPHONE ENCOUNTER
LOV with Huma Brunner MD , 10/18/2024  Pt had a mammogram last week which showed the need of additional imaging of the rt breast. Pt states that she is pregnant and has h/o lactating adenoma in the rt breast. She is wondering if you think this could be the same thing and if you believe she should have f/u imaging

## 2025-06-16 ENCOUNTER — PATIENT MESSAGE (OUTPATIENT)
Dept: REHABILITATION | Facility: HOSPITAL | Age: 40
End: 2025-06-16
Payer: COMMERCIAL

## 2025-06-19 ENCOUNTER — RESULTS FOLLOW-UP (OUTPATIENT)
Dept: INTERNAL MEDICINE | Facility: CLINIC | Age: 40
End: 2025-06-19

## 2025-06-19 ENCOUNTER — PROCEDURE VISIT (OUTPATIENT)
Dept: MATERNAL FETAL MEDICINE | Facility: CLINIC | Age: 40
End: 2025-06-19
Payer: COMMERCIAL

## 2025-06-19 ENCOUNTER — HOSPITAL ENCOUNTER (OUTPATIENT)
Dept: RADIOLOGY | Facility: OTHER | Age: 40
Discharge: HOME | End: 2025-06-19
Attending: INTERNAL MEDICINE
Payer: COMMERCIAL

## 2025-06-19 DIAGNOSIS — Z36.89 ENCOUNTER FOR ULTRASOUND TO ASSESS FETAL GROWTH: Primary | ICD-10-CM

## 2025-06-19 DIAGNOSIS — R92.8 ABNORMAL MAMMOGRAM: ICD-10-CM

## 2025-06-19 DIAGNOSIS — O09.819 SUPERVISION OF PREGNANCY RESULTING FROM ASSISTED REPRODUCTIVE TECHNOLOGY: ICD-10-CM

## 2025-06-19 PROCEDURE — 77061 BREAST TOMOSYNTHESIS UNI: CPT | Mod: TC,RT

## 2025-06-19 PROCEDURE — 76642 ULTRASOUND BREAST LIMITED: CPT | Mod: 26,RT,, | Performed by: RADIOLOGY

## 2025-06-19 PROCEDURE — 76642 ULTRASOUND BREAST LIMITED: CPT | Mod: TC,RT

## 2025-06-19 PROCEDURE — 76811 OB US DETAILED SNGL FETUS: CPT | Mod: S$GLB,,, | Performed by: STUDENT IN AN ORGANIZED HEALTH CARE EDUCATION/TRAINING PROGRAM

## 2025-06-19 PROCEDURE — 77065 DX MAMMO INCL CAD UNI: CPT | Mod: 26,RT,, | Performed by: RADIOLOGY

## 2025-06-19 PROCEDURE — 77061 BREAST TOMOSYNTHESIS UNI: CPT | Mod: 26,RT,, | Performed by: RADIOLOGY

## 2025-06-19 PROCEDURE — 76817 TRANSVAGINAL US OBSTETRIC: CPT | Mod: S$GLB,,, | Performed by: STUDENT IN AN ORGANIZED HEALTH CARE EDUCATION/TRAINING PROGRAM

## 2025-06-22 ENCOUNTER — PATIENT MESSAGE (OUTPATIENT)
Dept: OTHER | Facility: OTHER | Age: 40
End: 2025-06-22
Payer: COMMERCIAL

## 2025-06-24 ENCOUNTER — TELEPHONE (OUTPATIENT)
Dept: OBSTETRICS AND GYNECOLOGY | Facility: HOSPITAL | Age: 40
End: 2025-06-24
Payer: COMMERCIAL

## 2025-06-24 DIAGNOSIS — O09.819 SUPERVISION OF PREGNANCY RESULTING FROM ASSISTED REPRODUCTIVE TECHNOLOGY: Primary | ICD-10-CM

## 2025-06-24 NOTE — TELEPHONE ENCOUNTER
----- Message from Nurse Phipps sent at 6/23/2025  2:13 PM CDT -----  Regarding: FW: Glucose order request/scheduling  Please advise.    Thanks  ----- Message -----  From: Sophia Aguiar  Sent: 6/23/2025  11:46 AM CDT  To: [facility] Staff  Subject: Glucose order request/scheduling                 Pt called in requesting to schedule her glucose appt, can an order be placed for this appt and pt get a callback to be scheduled?    Mrn [id number]  Callback [phone]

## 2025-06-30 ENCOUNTER — PATIENT MESSAGE (OUTPATIENT)
Dept: REHABILITATION | Facility: HOSPITAL | Age: 40
End: 2025-06-30
Payer: COMMERCIAL

## 2025-07-20 ENCOUNTER — PATIENT MESSAGE (OUTPATIENT)
Dept: OTHER | Facility: OTHER | Age: 40
End: 2025-07-20
Payer: COMMERCIAL

## 2025-07-23 ENCOUNTER — PATIENT MESSAGE (OUTPATIENT)
Dept: REHABILITATION | Facility: HOSPITAL | Age: 40
End: 2025-07-23
Payer: COMMERCIAL

## 2025-07-31 ENCOUNTER — PATIENT MESSAGE (OUTPATIENT)
Dept: OBSTETRICS AND GYNECOLOGY | Facility: CLINIC | Age: 40
End: 2025-07-31
Payer: COMMERCIAL

## 2025-08-03 ENCOUNTER — PATIENT MESSAGE (OUTPATIENT)
Dept: OTHER | Facility: OTHER | Age: 40
End: 2025-08-03
Payer: COMMERCIAL

## 2025-08-17 ENCOUNTER — PATIENT MESSAGE (OUTPATIENT)
Dept: OTHER | Facility: OTHER | Age: 40
End: 2025-08-17
Payer: COMMERCIAL

## 2025-09-05 ENCOUNTER — IMMUNIZATION (OUTPATIENT)
Dept: INTERNAL MEDICINE | Facility: CLINIC | Age: 40
End: 2025-09-05
Payer: COMMERCIAL

## 2025-09-05 DIAGNOSIS — Z23 NEED FOR VACCINATION: Primary | ICD-10-CM

## (undated) DEVICE — NDL SPINAL 18GX3.5 SPINOCAN

## (undated) DEVICE — SOL IRR NACL .9% 3000ML

## (undated) DEVICE — GOWN B1 X-LG X-LONG

## (undated) DEVICE — SUT VICRYL 1 CT-1 27 UNDIE

## (undated) DEVICE — SUT VICRYL PLUS 3-0 SH 18IN

## (undated) DEVICE — DRAPE EMERALD 87X114.75X113

## (undated) DEVICE — BLADE SHAVER 4.5 6/BX

## (undated) DEVICE — SUT ETHILON 4-0 PS2 18 BLK

## (undated) DEVICE — SUT 4-0 ETHILON 18 PS-2

## (undated) DEVICE — GLOVE BIOGEL SKINSENSE PI 7.0

## (undated) DEVICE — Device

## (undated) DEVICE — SEE MEDLINE ITEM 152530

## (undated) DEVICE — SEE MEDLINE ITEM 157169

## (undated) DEVICE — DRAPE STERI INSTRUMENT 1018

## (undated) DEVICE — BRACE KNEE T SCOPE PREMIER

## (undated) DEVICE — APPLICATOR CHLORAPREP ORN 26ML

## (undated) DEVICE — REAMER 4.5MM GRAFTMAX FLEX CH

## (undated) DEVICE — DRESSING AQUACEL AG ADV 3.5X6

## (undated) DEVICE — PAD COLD THERAPY KNEE WRAP ON

## (undated) DEVICE — SEE MEDLINE ITEM 152529

## (undated) DEVICE — GLOVE BIOGEL SKINSENSE PI 8.5

## (undated) DEVICE — ELECTRODE REM PLYHSV RETURN 9

## (undated) DEVICE — MARKER SKIN STND TIP BLUE BARR

## (undated) DEVICE — ELECTRODE VAPR S 90 4.0MM

## (undated) DEVICE — TOURNIQUET SB QC DP 34X4IN

## (undated) DEVICE — SEE MEDLINE ITEM 157117

## (undated) DEVICE — ADHESIVE DERMABOND ADVANCED

## (undated) DEVICE — GOWN SMART IMP BREATHABLE XXLG

## (undated) DEVICE — PAD ABD 8X10 STERILE

## (undated) DEVICE — BLADE ULTRACUT 5.5

## (undated) DEVICE — SEE MEDLINE ITEM 157216

## (undated) DEVICE — SUT MCRYL PLUS 4-0 PS2 27IN

## (undated) DEVICE — SEE MEDLINE ITEM 157131

## (undated) DEVICE — PAD ELECTRODE STER 1.5X3

## (undated) DEVICE — SUT PDS VIL/BLU DUAL ORTHO

## (undated) DEVICE — GAUZE SPONGE 4X4 12PLY

## (undated) DEVICE — SUT VICRYL CT-1 2-0 27IN

## (undated) DEVICE — FULLY FLUTED REAMER

## (undated) DEVICE — PAD CAST SPECIALIST STRL 6

## (undated) DEVICE — UNDERGLOVES BIOGEL PI SZ 7 LF

## (undated) DEVICE — SEE MEDLINE ITEM 146313

## (undated) DEVICE — DRESSING XEROFORM FOIL PK 1X8

## (undated) DEVICE — SPEEDTRAP GRAFT PREP SYSTEM

## (undated) DEVICE — PUMP COLD THERAPY

## (undated) DEVICE — TRAY MINOR ORTHO

## (undated) DEVICE — SYR 30CC LUER LOCK

## (undated) DEVICE — CLOSURE SKIN STERI STRIP 1/2X4

## (undated) DEVICE — DRAPE PLASTIC U 60X72

## (undated) DEVICE — SHAVER SYS 5.5 ULRAFRR

## (undated) DEVICE — BLADE SURG CARBON STEEL SZ11

## (undated) DEVICE — SEE MEDLINE ITEM 152622

## (undated) DEVICE — RETRIEVER SUTURE HEWSON DISP

## (undated) DEVICE — DRAPE STERI U-SHAPED 47X51IN

## (undated) DEVICE — TUBE SET INFLOW/OUTFLOW

## (undated) DEVICE — ELECTRODE 90 DEGREE ANGLE

## (undated) DEVICE — SUT ETHILON 4-0 BLK MONO

## (undated) DEVICE — SEE MEDLINE ITEM 157150

## (undated) DEVICE — BANDAGE ESMARK 6X12

## (undated) DEVICE — PAD UNDERPAD 30X30

## (undated) DEVICE — SWABSTICK BENZOIN 4 IN

## (undated) DEVICE — BLADE SURG #15 CARBON STEEL

## (undated) DEVICE — UNDERGLOVES BIOGEL PI SIZE 8.5

## (undated) DEVICE — SHAVER ULTRAFFR 4.2MM

## (undated) DEVICE — ACL DISPOSABLE KIT

## (undated) DEVICE — SUT VICRYL+ 1 CT1 18IN